# Patient Record
Sex: MALE | Race: WHITE | NOT HISPANIC OR LATINO | Employment: OTHER | URBAN - METROPOLITAN AREA
[De-identification: names, ages, dates, MRNs, and addresses within clinical notes are randomized per-mention and may not be internally consistent; named-entity substitution may affect disease eponyms.]

---

## 2023-08-22 ENCOUNTER — APPOINTMENT (OUTPATIENT)
Dept: CARDIOLOGY | Facility: HOSPITAL | Age: 53
DRG: 281 | End: 2023-08-22
Payer: COMMERCIAL

## 2023-08-22 ENCOUNTER — APPOINTMENT (OUTPATIENT)
Dept: GENERAL RADIOLOGY | Facility: HOSPITAL | Age: 53
DRG: 281 | End: 2023-08-22
Payer: COMMERCIAL

## 2023-08-22 ENCOUNTER — APPOINTMENT (OUTPATIENT)
Dept: ULTRASOUND IMAGING | Facility: HOSPITAL | Age: 53
DRG: 281 | End: 2023-08-22
Payer: COMMERCIAL

## 2023-08-22 ENCOUNTER — HOSPITAL ENCOUNTER (INPATIENT)
Facility: HOSPITAL | Age: 53
LOS: 9 days | Discharge: HOME | DRG: 281 | End: 2023-08-31
Attending: EMERGENCY MEDICINE | Admitting: FAMILY MEDICINE
Payer: COMMERCIAL

## 2023-08-22 DIAGNOSIS — R26.2 DIFFICULTY WALKING: ICD-10-CM

## 2023-08-22 DIAGNOSIS — Z74.09 IMPAIRED MOBILITY AND ADLS: ICD-10-CM

## 2023-08-22 DIAGNOSIS — I50.9 ACUTE ON CHRONIC CONGESTIVE HEART FAILURE, UNSPECIFIED HEART FAILURE TYPE: Primary | ICD-10-CM

## 2023-08-22 DIAGNOSIS — Z78.9 IMPAIRED MOBILITY AND ADLS: ICD-10-CM

## 2023-08-22 DIAGNOSIS — I50.21 ACUTE HFREF (HEART FAILURE WITH REDUCED EJECTION FRACTION): ICD-10-CM

## 2023-08-22 DIAGNOSIS — R07.89 CHEST TIGHTNESS: ICD-10-CM

## 2023-08-22 LAB
ALBUMIN SERPL-MCNC: 4.2 G/DL (ref 3.5–5.2)
ALBUMIN SERPL-MCNC: 4.4 G/DL (ref 3.5–5.2)
ALBUMIN/GLOB SERPL: 1.4 G/DL
ALBUMIN/GLOB SERPL: 1.5 G/DL
ALP SERPL-CCNC: 105 U/L (ref 39–117)
ALP SERPL-CCNC: 111 U/L (ref 39–117)
ALT SERPL W P-5'-P-CCNC: 17 U/L (ref 1–41)
ALT SERPL W P-5'-P-CCNC: 18 U/L (ref 1–41)
ANION GAP SERPL CALCULATED.3IONS-SCNC: 11.5 MMOL/L (ref 5–15)
ANION GAP SERPL CALCULATED.3IONS-SCNC: 11.9 MMOL/L (ref 5–15)
AST SERPL-CCNC: 73 U/L (ref 1–40)
AST SERPL-CCNC: 78 U/L (ref 1–40)
BASOPHILS # BLD AUTO: 0.03 10*3/MM3 (ref 0–0.2)
BASOPHILS # BLD AUTO: 0.06 10*3/MM3 (ref 0–0.2)
BASOPHILS NFR BLD AUTO: 0.7 % (ref 0–1.5)
BASOPHILS NFR BLD AUTO: 1 % (ref 0–1.5)
BH CV ECHO MEAS - AI P1/2T: 475.7 MSEC
BH CV ECHO MEAS - AO ROOT DIAM: 3.3 CM
BH CV ECHO MEAS - EDV(CUBED): 243 ML
BH CV ECHO MEAS - EDV(MOD-SP2): 175 ML
BH CV ECHO MEAS - EDV(MOD-SP4): 258 ML
BH CV ECHO MEAS - EF(MOD-BP): 10.3 %
BH CV ECHO MEAS - EF(MOD-SP2): 23.4 %
BH CV ECHO MEAS - EF(MOD-SP4): 4.3 %
BH CV ECHO MEAS - ESV(CUBED): 209.7 ML
BH CV ECHO MEAS - ESV(MOD-SP2): 134 ML
BH CV ECHO MEAS - ESV(MOD-SP4): 247 ML
BH CV ECHO MEAS - FS: 4.8 %
BH CV ECHO MEAS - IVS/LVPW: 1.02 CM
BH CV ECHO MEAS - IVSD: 0.95 CM
BH CV ECHO MEAS - LA DIMENSION: 4.3 CM
BH CV ECHO MEAS - LAT PEAK E' VEL: 4.7 CM/SEC
BH CV ECHO MEAS - LV MASS(C)D: 241.9 GRAMS
BH CV ECHO MEAS - LVIDD: 6.2 CM
BH CV ECHO MEAS - LVIDS: 5.9 CM
BH CV ECHO MEAS - LVPWD: 0.92 CM
BH CV ECHO MEAS - MED PEAK E' VEL: 3.5 CM/SEC
BH CV ECHO MEAS - MV A MAX VEL: 39 CM/SEC
BH CV ECHO MEAS - MV DEC SLOPE: 401.6 CM/SEC2
BH CV ECHO MEAS - MV DEC TIME: 0.11 MSEC
BH CV ECHO MEAS - MV E MAX VEL: 42.4 CM/SEC
BH CV ECHO MEAS - MV E/A: 1.09
BH CV ECHO MEAS - RAP SYSTOLE: 5 MMHG
BH CV ECHO MEAS - RVDD: 2.02 CM
BH CV ECHO MEAS - RVSP: 24.1 MMHG
BH CV ECHO MEAS - SV(MOD-SP2): 41 ML
BH CV ECHO MEAS - SV(MOD-SP4): 11 ML
BH CV ECHO MEAS - TR MAX PG: 19.1 MMHG
BH CV ECHO MEAS - TR MAX VEL: 218.5 CM/SEC
BH CV ECHO MEASUREMENTS AVERAGE E/E' RATIO: 10.34
BILIRUB SERPL-MCNC: 0.6 MG/DL (ref 0–1.2)
BILIRUB SERPL-MCNC: 0.6 MG/DL (ref 0–1.2)
BUN SERPL-MCNC: 11 MG/DL (ref 6–20)
BUN SERPL-MCNC: 9 MG/DL (ref 6–20)
BUN/CREAT SERPL: 8.4 (ref 7–25)
BUN/CREAT SERPL: 9.6 (ref 7–25)
CALCIUM SPEC-SCNC: 8.3 MG/DL (ref 8.6–10.5)
CALCIUM SPEC-SCNC: 8.9 MG/DL (ref 8.6–10.5)
CHLORIDE SERPL-SCNC: 94 MMOL/L (ref 98–107)
CHLORIDE SERPL-SCNC: 96 MMOL/L (ref 98–107)
CHOLEST SERPL-MCNC: 128 MG/DL (ref 0–200)
CO2 SERPL-SCNC: 24.5 MMOL/L (ref 22–29)
CO2 SERPL-SCNC: 25.1 MMOL/L (ref 22–29)
CREAT SERPL-MCNC: 1.07 MG/DL (ref 0.76–1.27)
CREAT SERPL-MCNC: 1.15 MG/DL (ref 0.76–1.27)
D-LACTATE SERPL-SCNC: 1.9 MMOL/L (ref 0.5–2)
DEPRECATED RDW RBC AUTO: 55.6 FL (ref 37–54)
DEPRECATED RDW RBC AUTO: 55.7 FL (ref 37–54)
EGFRCR SERPLBLD CKD-EPI 2021: 77.1 ML/MIN/1.73
EGFRCR SERPLBLD CKD-EPI 2021: 84 ML/MIN/1.73
EOSINOPHIL # BLD AUTO: 0.08 10*3/MM3 (ref 0–0.4)
EOSINOPHIL # BLD AUTO: 0.16 10*3/MM3 (ref 0–0.4)
EOSINOPHIL NFR BLD AUTO: 1.8 % (ref 0.3–6.2)
EOSINOPHIL NFR BLD AUTO: 2.6 % (ref 0.3–6.2)
ERYTHROCYTE [DISTWIDTH] IN BLOOD BY AUTOMATED COUNT: 15 % (ref 12.3–15.4)
ERYTHROCYTE [DISTWIDTH] IN BLOOD BY AUTOMATED COUNT: 15 % (ref 12.3–15.4)
FOLATE SERPL-MCNC: 10.1 NG/ML (ref 4.78–24.2)
GEN 5 2HR TROPONIN T REFLEX: 125 NG/L
GLOBULIN UR ELPH-MCNC: 3 GM/DL
GLOBULIN UR ELPH-MCNC: 3 GM/DL
GLUCOSE SERPL-MCNC: 68 MG/DL (ref 65–99)
GLUCOSE SERPL-MCNC: 78 MG/DL (ref 65–99)
HBA1C MFR BLD: 5.4 % (ref 4.8–5.6)
HCT VFR BLD AUTO: 30 % (ref 37.5–51)
HCT VFR BLD AUTO: 31.2 % (ref 37.5–51)
HDLC SERPL-MCNC: 57 MG/DL (ref 40–60)
HGB BLD-MCNC: 10.8 G/DL (ref 13–17.7)
HGB BLD-MCNC: 10.8 G/DL (ref 13–17.7)
HOLD SPECIMEN: NORMAL
HOLD SPECIMEN: NORMAL
IMM GRANULOCYTES # BLD AUTO: 0.01 10*3/MM3 (ref 0–0.05)
IMM GRANULOCYTES # BLD AUTO: 0.02 10*3/MM3 (ref 0–0.05)
IMM GRANULOCYTES NFR BLD AUTO: 0.2 % (ref 0–0.5)
IMM GRANULOCYTES NFR BLD AUTO: 0.3 % (ref 0–0.5)
LDLC SERPL CALC-MCNC: 58 MG/DL (ref 0–100)
LDLC/HDLC SERPL: 1.04 {RATIO}
LEFT ATRIUM VOLUME INDEX: 46.2 ML/M2
LYMPHOCYTES # BLD AUTO: 1.54 10*3/MM3 (ref 0.7–3.1)
LYMPHOCYTES # BLD AUTO: 1.78 10*3/MM3 (ref 0.7–3.1)
LYMPHOCYTES NFR BLD AUTO: 28.4 % (ref 19.6–45.3)
LYMPHOCYTES NFR BLD AUTO: 34.8 % (ref 19.6–45.3)
MCH RBC QN AUTO: 35.3 PG (ref 26.6–33)
MCH RBC QN AUTO: 36.1 PG (ref 26.6–33)
MCHC RBC AUTO-ENTMCNC: 34.6 G/DL (ref 31.5–35.7)
MCHC RBC AUTO-ENTMCNC: 36 G/DL (ref 31.5–35.7)
MCV RBC AUTO: 100.3 FL (ref 79–97)
MCV RBC AUTO: 102 FL (ref 79–97)
MONOCYTES # BLD AUTO: 0.4 10*3/MM3 (ref 0.1–0.9)
MONOCYTES # BLD AUTO: 0.57 10*3/MM3 (ref 0.1–0.9)
MONOCYTES NFR BLD AUTO: 9 % (ref 5–12)
MONOCYTES NFR BLD AUTO: 9.1 % (ref 5–12)
NEUTROPHILS NFR BLD AUTO: 2.37 10*3/MM3 (ref 1.7–7)
NEUTROPHILS NFR BLD AUTO: 3.67 10*3/MM3 (ref 1.7–7)
NEUTROPHILS NFR BLD AUTO: 53.5 % (ref 42.7–76)
NEUTROPHILS NFR BLD AUTO: 58.6 % (ref 42.7–76)
NRBC BLD AUTO-RTO: 0 /100 WBC (ref 0–0.2)
NRBC BLD AUTO-RTO: 0 /100 WBC (ref 0–0.2)
NT-PROBNP SERPL-MCNC: 2246 PG/ML (ref 0–900)
PLATELET # BLD AUTO: 290 10*3/MM3 (ref 140–450)
PLATELET # BLD AUTO: 318 10*3/MM3 (ref 140–450)
PMV BLD AUTO: 8.6 FL (ref 6–12)
PMV BLD AUTO: 8.8 FL (ref 6–12)
POTASSIUM SERPL-SCNC: 3.3 MMOL/L (ref 3.5–5.2)
POTASSIUM SERPL-SCNC: 4 MMOL/L (ref 3.5–5.2)
PROT SERPL-MCNC: 7.2 G/DL (ref 6–8.5)
PROT SERPL-MCNC: 7.4 G/DL (ref 6–8.5)
QT INTERVAL: 411 MS
RBC # BLD AUTO: 2.99 10*6/MM3 (ref 4.14–5.8)
RBC # BLD AUTO: 3.06 10*6/MM3 (ref 4.14–5.8)
SODIUM SERPL-SCNC: 131 MMOL/L (ref 136–145)
SODIUM SERPL-SCNC: 132 MMOL/L (ref 136–145)
TRIGL SERPL-MCNC: 60 MG/DL (ref 0–150)
TROPONIN T DELTA: -26 NG/L
TROPONIN T SERPL HS-MCNC: 151 NG/L
VIT B12 BLD-MCNC: 340 PG/ML (ref 211–946)
VLDLC SERPL-MCNC: 13 MG/DL (ref 5–40)
WBC NRBC COR # BLD: 4.43 10*3/MM3 (ref 3.4–10.8)
WBC NRBC COR # BLD: 6.26 10*3/MM3 (ref 3.4–10.8)
WHOLE BLOOD HOLD COAG: NORMAL
WHOLE BLOOD HOLD SPECIMEN: NORMAL

## 2023-08-22 PROCEDURE — 87040 BLOOD CULTURE FOR BACTERIA: CPT

## 2023-08-22 PROCEDURE — 94799 UNLISTED PULMONARY SVC/PX: CPT

## 2023-08-22 PROCEDURE — 87040 BLOOD CULTURE FOR BACTERIA: CPT | Performed by: EMERGENCY MEDICINE

## 2023-08-22 PROCEDURE — 93005 ELECTROCARDIOGRAM TRACING: CPT

## 2023-08-22 PROCEDURE — 94761 N-INVAS EAR/PLS OXIMETRY MLT: CPT

## 2023-08-22 PROCEDURE — 80053 COMPREHEN METABOLIC PANEL: CPT | Performed by: STUDENT IN AN ORGANIZED HEALTH CARE EDUCATION/TRAINING PROGRAM

## 2023-08-22 PROCEDURE — 83036 HEMOGLOBIN GLYCOSYLATED A1C: CPT | Performed by: STUDENT IN AN ORGANIZED HEALTH CARE EDUCATION/TRAINING PROGRAM

## 2023-08-22 PROCEDURE — 71045 X-RAY EXAM CHEST 1 VIEW: CPT

## 2023-08-22 PROCEDURE — 80053 COMPREHEN METABOLIC PANEL: CPT

## 2023-08-22 PROCEDURE — 25010000002 MORPHINE PER 10 MG: Performed by: FAMILY MEDICINE

## 2023-08-22 PROCEDURE — 82746 ASSAY OF FOLIC ACID SERUM: CPT | Performed by: STUDENT IN AN ORGANIZED HEALTH CARE EDUCATION/TRAINING PROGRAM

## 2023-08-22 PROCEDURE — 84484 ASSAY OF TROPONIN QUANT: CPT

## 2023-08-22 PROCEDURE — 25010000002 FUROSEMIDE PER 20 MG: Performed by: PHYSICIAN ASSISTANT

## 2023-08-22 PROCEDURE — 99223 1ST HOSP IP/OBS HIGH 75: CPT | Performed by: STUDENT IN AN ORGANIZED HEALTH CARE EDUCATION/TRAINING PROGRAM

## 2023-08-22 PROCEDURE — 85025 COMPLETE CBC W/AUTO DIFF WBC: CPT

## 2023-08-22 PROCEDURE — 80061 LIPID PANEL: CPT | Performed by: STUDENT IN AN ORGANIZED HEALTH CARE EDUCATION/TRAINING PROGRAM

## 2023-08-22 PROCEDURE — 25010000002 FUROSEMIDE PER 20 MG: Performed by: EMERGENCY MEDICINE

## 2023-08-22 PROCEDURE — 25010000002 MORPHINE PER 10 MG: Performed by: EMERGENCY MEDICINE

## 2023-08-22 PROCEDURE — 93306 TTE W/DOPPLER COMPLETE: CPT | Performed by: INTERNAL MEDICINE

## 2023-08-22 PROCEDURE — 36415 COLL VENOUS BLD VENIPUNCTURE: CPT | Performed by: EMERGENCY MEDICINE

## 2023-08-22 PROCEDURE — 93971 EXTREMITY STUDY: CPT

## 2023-08-22 PROCEDURE — 93306 TTE W/DOPPLER COMPLETE: CPT

## 2023-08-22 PROCEDURE — 84484 ASSAY OF TROPONIN QUANT: CPT | Performed by: EMERGENCY MEDICINE

## 2023-08-22 PROCEDURE — 82607 VITAMIN B-12: CPT | Performed by: STUDENT IN AN ORGANIZED HEALTH CARE EDUCATION/TRAINING PROGRAM

## 2023-08-22 PROCEDURE — 25010000002 HEPARIN (PORCINE) PER 1000 UNITS: Performed by: STUDENT IN AN ORGANIZED HEALTH CARE EDUCATION/TRAINING PROGRAM

## 2023-08-22 PROCEDURE — 76700 US EXAM ABDOM COMPLETE: CPT

## 2023-08-22 PROCEDURE — 93005 ELECTROCARDIOGRAM TRACING: CPT | Performed by: EMERGENCY MEDICINE

## 2023-08-22 PROCEDURE — 83880 ASSAY OF NATRIURETIC PEPTIDE: CPT

## 2023-08-22 PROCEDURE — 93971 EXTREMITY STUDY: CPT | Performed by: SURGERY

## 2023-08-22 PROCEDURE — 99285 EMERGENCY DEPT VISIT HI MDM: CPT

## 2023-08-22 PROCEDURE — 99222 1ST HOSP IP/OBS MODERATE 55: CPT | Performed by: INTERNAL MEDICINE

## 2023-08-22 PROCEDURE — 85025 COMPLETE CBC W/AUTO DIFF WBC: CPT | Performed by: STUDENT IN AN ORGANIZED HEALTH CARE EDUCATION/TRAINING PROGRAM

## 2023-08-22 PROCEDURE — 83605 ASSAY OF LACTIC ACID: CPT

## 2023-08-22 RX ORDER — MORPHINE SULFATE 2 MG/ML
2 INJECTION, SOLUTION INTRAMUSCULAR; INTRAVENOUS EVERY 6 HOURS PRN
Status: DISCONTINUED | OUTPATIENT
Start: 2023-08-22 | End: 2023-08-27

## 2023-08-22 RX ORDER — SODIUM CHLORIDE 0.9 % (FLUSH) 0.9 %
10 SYRINGE (ML) INJECTION AS NEEDED
Status: DISCONTINUED | OUTPATIENT
Start: 2023-08-22 | End: 2023-08-31 | Stop reason: HOSPADM

## 2023-08-22 RX ORDER — LORAZEPAM 2 MG/ML
2 INJECTION INTRAMUSCULAR
Status: DISCONTINUED | OUTPATIENT
Start: 2023-08-22 | End: 2023-08-28

## 2023-08-22 RX ORDER — LORAZEPAM 2 MG/1
2 TABLET ORAL
Status: DISCONTINUED | OUTPATIENT
Start: 2023-08-22 | End: 2023-08-28

## 2023-08-22 RX ORDER — HYDROCODONE BITARTRATE AND ACETAMINOPHEN 10; 325 MG/1; MG/1
1 TABLET ORAL EVERY 6 HOURS PRN
Status: DISCONTINUED | OUTPATIENT
Start: 2023-08-22 | End: 2023-08-31 | Stop reason: HOSPADM

## 2023-08-22 RX ORDER — FUROSEMIDE 10 MG/ML
40 INJECTION INTRAMUSCULAR; INTRAVENOUS
Status: DISCONTINUED | OUTPATIENT
Start: 2023-08-22 | End: 2023-08-25

## 2023-08-22 RX ORDER — SPIRONOLACTONE 25 MG/1
25 TABLET ORAL DAILY
Status: DISCONTINUED | OUTPATIENT
Start: 2023-08-22 | End: 2023-08-29

## 2023-08-22 RX ORDER — MULTIPLE VITAMINS W/ MINERALS TAB 9MG-400MCG
1 TAB ORAL DAILY
Status: DISCONTINUED | OUTPATIENT
Start: 2023-08-22 | End: 2023-08-28

## 2023-08-22 RX ORDER — SODIUM CHLORIDE 0.9 % (FLUSH) 0.9 %
10 SYRINGE (ML) INJECTION EVERY 12 HOURS SCHEDULED
Status: DISCONTINUED | OUTPATIENT
Start: 2023-08-22 | End: 2023-08-31 | Stop reason: HOSPADM

## 2023-08-22 RX ORDER — HYDROCODONE BITARTRATE AND ACETAMINOPHEN 5; 325 MG/1; MG/1
1 TABLET ORAL EVERY 6 HOURS PRN
Status: DISCONTINUED | OUTPATIENT
Start: 2023-08-22 | End: 2023-08-31 | Stop reason: HOSPADM

## 2023-08-22 RX ORDER — FUROSEMIDE 10 MG/ML
40 INJECTION INTRAMUSCULAR; INTRAVENOUS ONCE
Status: COMPLETED | OUTPATIENT
Start: 2023-08-22 | End: 2023-08-22

## 2023-08-22 RX ORDER — SODIUM CHLORIDE 9 MG/ML
40 INJECTION, SOLUTION INTRAVENOUS AS NEEDED
Status: DISCONTINUED | OUTPATIENT
Start: 2023-08-22 | End: 2023-08-31 | Stop reason: HOSPADM

## 2023-08-22 RX ORDER — LORAZEPAM 2 MG/ML
1 INJECTION INTRAMUSCULAR
Status: DISCONTINUED | OUTPATIENT
Start: 2023-08-22 | End: 2023-08-28

## 2023-08-22 RX ORDER — METOPROLOL SUCCINATE 25 MG/1
25 TABLET, EXTENDED RELEASE ORAL
Status: DISCONTINUED | OUTPATIENT
Start: 2023-08-22 | End: 2023-08-29

## 2023-08-22 RX ORDER — FOLIC ACID 1 MG/1
1 TABLET ORAL DAILY
Status: DISCONTINUED | OUTPATIENT
Start: 2023-08-22 | End: 2023-08-28

## 2023-08-22 RX ORDER — LORAZEPAM 0.5 MG/1
1 TABLET ORAL
Status: DISCONTINUED | OUTPATIENT
Start: 2023-08-22 | End: 2023-08-28

## 2023-08-22 RX ORDER — HEPARIN SODIUM 5000 [USP'U]/ML
5000 INJECTION, SOLUTION INTRAVENOUS; SUBCUTANEOUS EVERY 8 HOURS SCHEDULED
Status: DISCONTINUED | OUTPATIENT
Start: 2023-08-22 | End: 2023-08-31 | Stop reason: HOSPADM

## 2023-08-22 RX ORDER — FUROSEMIDE 10 MG/ML
40 INJECTION INTRAMUSCULAR; INTRAVENOUS ONCE
Status: DISCONTINUED | OUTPATIENT
Start: 2023-08-22 | End: 2023-08-22

## 2023-08-22 RX ADMIN — SPIRONOLACTONE 25 MG: 25 TABLET ORAL at 18:25

## 2023-08-22 RX ADMIN — Medication 10 ML: at 15:12

## 2023-08-22 RX ADMIN — MORPHINE SULFATE 2 MG: 2 INJECTION, SOLUTION INTRAMUSCULAR; INTRAVENOUS at 15:11

## 2023-08-22 RX ADMIN — EMPAGLIFLOZIN 10 MG: 10 TABLET, FILM COATED ORAL at 15:11

## 2023-08-22 RX ADMIN — Medication 100 MG: at 10:05

## 2023-08-22 RX ADMIN — MORPHINE SULFATE 2 MG: 2 INJECTION, SOLUTION INTRAMUSCULAR; INTRAVENOUS at 21:42

## 2023-08-22 RX ADMIN — Medication 10 ML: at 21:43

## 2023-08-22 RX ADMIN — HYDROCODONE BITARTRATE AND ACETAMINOPHEN 1 TABLET: 10; 325 TABLET ORAL at 10:24

## 2023-08-22 RX ADMIN — HYDROCODONE BITARTRATE AND ACETAMINOPHEN 1 TABLET: 10; 325 TABLET ORAL at 18:24

## 2023-08-22 RX ADMIN — HEPARIN SODIUM 5000 UNITS: 5000 INJECTION INTRAVENOUS; SUBCUTANEOUS at 21:42

## 2023-08-22 RX ADMIN — FUROSEMIDE 40 MG: 10 INJECTION, SOLUTION INTRAMUSCULAR; INTRAVENOUS at 18:25

## 2023-08-22 RX ADMIN — METOPROLOL SUCCINATE 25 MG: 25 TABLET, EXTENDED RELEASE ORAL at 15:08

## 2023-08-22 RX ADMIN — Medication 1 TABLET: at 10:04

## 2023-08-22 RX ADMIN — MORPHINE SULFATE 4 MG: 4 INJECTION, SOLUTION INTRAMUSCULAR; INTRAVENOUS at 06:34

## 2023-08-22 RX ADMIN — WHITE PETROLATUM 1 APPLICATION: 1.75 OINTMENT TOPICAL at 18:24

## 2023-08-22 RX ADMIN — MORPHINE SULFATE 2 MG: 2 INJECTION, SOLUTION INTRAMUSCULAR; INTRAVENOUS at 09:22

## 2023-08-22 RX ADMIN — FOLIC ACID 1 MG: 1 TABLET ORAL at 10:03

## 2023-08-22 RX ADMIN — HEPARIN SODIUM 5000 UNITS: 5000 INJECTION INTRAVENOUS; SUBCUTANEOUS at 15:10

## 2023-08-22 RX ADMIN — FUROSEMIDE 40 MG: 10 INJECTION, SOLUTION INTRAMUSCULAR; INTRAVENOUS at 06:34

## 2023-08-22 NOTE — H&P
[facility]  Date: 2023   Patient Name: Greyson Schofield  : 1970  MRN: [id number]  Primary Care Physician:  Bertha Doe DO  Date of admission: 2023    Subjective   Subjective     Chief Complaint: Bilateral lower extremity swelling    HPI:    Greyson Schofield is a 53 y.o. male with a past medical history of traumatic brain injury, no other medical conditions presented to the ED for evaluation of bilateral lower extremity swelling that started approximately 4 days ago and has been progressively worsening.  Patient has come from [place] by road in early July and since then he had mild bilateral lower extremity edema but for the last 4 days lower extremity edema has been increasing and it has extended up to the sacral region.  Associated with shortness of breath, orthopnea.  Drinks 4 shots of alcohol every day since many years, last drink yesterday night.  Denies any known liver disease.  Denies any chest pain, nausea, vomiting, diaphoresis, focal weakness, numbness, tingling.  Patient states that he has a strong family history of heart failure with multiple family members having the heart failure.    In the ED, vital signs 97.5, pulse 88, respirate rate 18, blood pressure 108/76, on room air saturating around 98%.  Initial troponin 151, repeat troponin 125, proBNP 2246, sodium 132, AST/ALT 78/18, chloride 96, rest of the CMP within normal limits, normal lactic acid, WBC 6.26 hemoglobin 10.8, platelets 318.  Blood cultures pending.  Chest x-ray showed mild to moderate cardiomegaly.  Mild pulmonary edema with vascular congestion and suggested.  Mild subsegmental atelectasis may involve the lung bases.  Pneumonia cannot be excluded entirely but is thought to be less likely.  Patient has been admitted for further evaluation and management of anasarca, possible new onset CHF exacerbation      Personal History     Past medical history  Traumatic brain injury 7  years ago      Past Surgical History:   Procedure Laterality Date    APPENDECTOMY           History reviewed. No pertinent family history.      Social History     Socioeconomic History    Marital status:    Tobacco Use    Smoking status: Every Day     Packs/day: 0.50     Types: Cigarettes   Substance and Sexual Activity    Alcohol use: Yes     Alcohol/week: 4.0 standard drinks     Types: 4 Shots of liquor per week     Comment: bourbon 2-4 shots per day    Drug use: Yes     Types: Marijuana         Home Medications:       Allergies:  No Known Allergies    Review of Systems   All other systems reviewed and negative except as mentioned above in the HPI    Objective   Objective     Vitals:   Temp:  [97.5 °F (36.4 °C)] 97.5 °F (36.4 °C)  Heart Rate:  [82-88] 82  Resp:  [18] 18  BP: (108-116)/(76-95) 116/95    Physical Exam    Constitutional: Awake, alert, mild acute distress, oriented x3   Eyes: Pupils equal, sclerae anicteric, no conjunctival injection   HENT: NCAT, mucous membranes moist   Respiratory: Bilateral air entry present, bibasilar crackles noted, nonlabored respirations    Cardiovascular: RRR, no murmurs, rubs, or gallops   Gastrointestinal: Positive bowel sounds, soft, nontender, nondistended   Musculoskeletal: 3+ bilateral lower extremity edema extending up to the sacral region, no clubbing or cyanosis to extremities   Neurologic: Oriented x 3, speech clear      Result Review    Result Review:  I have personally reviewed the results from the time of this admission to 8/22/2023 06:54 EDT and agree with these findings:  [x]  Laboratory  []  Microbiology  []  Radiology  [x]  EKG/Telemetry   [x]  Cardiology/Vascular   []  Pathology  []  Old records  []  Other:        Imaging Results (Last 24 Hours)       Procedure Component Value Units Date/Time    XR Chest 1 View [[id number]] Collected: 08/22/23 0305     Updated: 08/22/23 0308    Narrative:      PROCEDURE: XR CHEST 1 VW     COMPARISON: None.      INDICATIONS: shortness of breath     FINDINGS:   A single AP upright portable chest radiograph reveals mild-to-moderate cardiomegaly, mild pulmonary   edema with vascular congestion, minimal, if any, pleural effusion, no pneumothorax, and an old   healed distal right clavicle fracture.  Mild thoracic aortic atherosclerotic change is possible.    There may be chronic calcified granulomatous disease of the chest.       Impression:            1. There is mild-to-moderate cardiomegaly.       2. Mild pulmonary edema with vascular congestion is suggested.  Mild subsegmental atelectasis may   involve the lung bases.  Pneumonia cannot be excluded entirely but is thought to be less likely.     3. Please see above comments for further detail.                 Please note that portions of this note were completed with a voice recognition program.     CORINA OLVERA JR, MD         Electronically Signed and Approved By: CORINA OLVERA JR, MD on 8/22/2023 at 3:05                                         Assessment & Plan   Assessment / Plan     Assessment/Plan:   Anasarca  Possible New onset CHF  Pulmonary edema  Elevated troponin likely NSTEMI type II from cardiac strain from CHF exacerbation  Mild hyponatremia, likely due to volume overload  Macrocytic anemia  History of chronic alcohol use    Plan  Admit to inpatient, telemetry  Received IV Lasix 40 mg  Redose another IV Lasix 40 mg in 6 hours, further diuresis per cardiology   Strict I&O's  Daily weights  Cardiac echo  Will consult cardiology in the a.m. Dr. Lynch  Heart healthy, consistent carb diet  A1c, lipid panel, B12, folic acid levels  Monitor electrolytes, kidney function with labs around noon  Keep K greater than 4, mag greater than 2  Check urinalysis for any underlying proteinuria  Monitor on CIWA protocol    DVT prophylaxis:  No DVT prophylaxis order currently exists.    CODE STATUS:    Level Of Support Discussed With: Patient  Code Status (Patient has no  pulse and is not breathing): CPR (Attempt to Resuscitate)  Medical Interventions (Patient has pulse or is breathing): Full Support      Admission Status:  I believe this patient meets inpatient status.    Part of this note may be an electronic transcription/translation of spoken language to printed text using the Dragon Dictation System    Keyonna Gibson MD

## 2023-08-22 NOTE — Clinical Note
The DP pulses are +1 bilaterally. The PT pulses are +1 bilaterally. The left radial pulse is +2. The left femoral pulse is +2.

## 2023-08-22 NOTE — ED PROVIDER NOTES
Time: 6:35 AM EDT  Date of encounter:  8/22/2023  Independent Historian/Clinical History and Information was obtained by:   Patient    History is limited by: N/A    Chief Complaint: lower extremity edema      History of Present Illness:  Patient is a 53 y.o. year old male who presents to the emergency department for evaluation of Bilateral lower extremity edema.  Patient states this started approximate 4 days ago and has been getting progressively worse.  He also reports shortness of breath.  He denies any chest pain.  He states this also feels as if his abdomen is distended.  He does have a history of daily alcohol use.  He states he drinks 4 shots daily.  Denies any known history of heart or liver disease.    HPI    Patient Care Team  Primary Care Provider: Bertha Doe DO    Past Medical History:     No Known Allergies  Past Medical History:   Diagnosis Date    Arthritis      Past Surgical History:   Procedure Laterality Date    APPENDECTOMY       History reviewed. No pertinent family history.    Home Medications:  Prior to Admission medications    Not on File        Social History:   Social History     Tobacco Use    Smoking status: Every Day     Packs/day: 0.50     Years: 33.00     Pack years: 16.50     Types: Cigarettes     Start date: 10/1993    Smokeless tobacco: Former     Types: Snuff   Substance Use Topics    Drug use: Yes     Types: Marijuana, Cocaine(coke)         Review of Systems:  Review of Systems   Constitutional:  Negative for chills and fever.   HENT:  Negative for congestion, ear pain and sore throat.    Eyes:  Negative for pain.   Respiratory:  Negative for cough, chest tightness and shortness of breath.    Cardiovascular:  Positive for leg swelling. Negative for chest pain.   Gastrointestinal:  Negative for abdominal pain, diarrhea, nausea and vomiting.   Genitourinary:  Negative for flank pain and hematuria.   Musculoskeletal:  Negative for joint swelling.   Skin:  Negative for pallor.  "  Neurological:  Negative for seizures and headaches.   All other systems reviewed and are negative.     Physical Exam:  /85 (BP Location: Right arm, Patient Position: Lying)   Pulse 82   Temp 98.3 °F (36.8 °C) (Oral)   Resp 18   Ht 165.1 cm (65\")   Wt 82.3 kg (181 lb 7 oz)   SpO2 92%   BMI 30.19 kg/m²     Physical Exam  Constitutional:       Appearance: Normal appearance.   HENT:      Head: Normocephalic and atraumatic.      Nose: Nose normal.      Mouth/Throat:      Mouth: Mucous membranes are moist.   Eyes:      Extraocular Movements: Extraocular movements intact.      Conjunctiva/sclera: Conjunctivae normal.      Pupils: Pupils are equal, round, and reactive to light.   Cardiovascular:      Rate and Rhythm: Normal rate and regular rhythm.      Pulses: Normal pulses.      Heart sounds: Normal heart sounds.   Pulmonary:      Effort: Pulmonary effort is normal.      Breath sounds: Normal breath sounds.   Abdominal:      General: There is distension.      Palpations: Abdomen is soft.      Tenderness: There is no abdominal tenderness.   Musculoskeletal:         General: Normal range of motion.      Cervical back: Normal range of motion.      Right lower leg: Edema present.      Left lower leg: Edema present.      Comments: anasarca   Skin:     General: Skin is warm and dry.      Capillary Refill: Capillary refill takes less than 2 seconds.   Neurological:      General: No focal deficit present.      Mental Status: He is alert and oriented to person, place, and time. Mental status is at baseline.   Psychiatric:         Mood and Affect: Mood normal.         Behavior: Behavior normal.                Procedures:  Procedures      Medical Decision Making:      Comorbidities that affect care:    Daily alcohol use    External Notes reviewed:    None      The following orders were placed and all results were independently analyzed by me:  Orders Placed This Encounter   Procedures    Blood Culture - Blood,    " Blood Culture - Blood,    XR Chest 1 View    US Abdomen Complete    [place] Draw    Comprehensive Metabolic Panel    BNP    Single High Sensitivity Troponin T    Lactic Acid, Plasma    CBC Auto Differential    High Sensitivity Troponin T 2Hr    CBC Auto Differential    Comprehensive Metabolic Panel    Lipid Panel    Hemoglobin A1c    Urinalysis With Microscopic If Indicated (No Culture) - Urine, Clean Catch    Vitamin B12    Folate    CBC (No Diff)    Renal Function Panel    Hepatic Function Panel    Magnesium    Vitamin B12    Folate    Diet: Cardiac Diets; Healthy Heart (2-3 Na+); Texture: Regular Texture (IDDSI 7); Fluid Consistency: Thin (IDDSI 0)    Undress & Gown    Vital Signs    Vital Signs    Intake & Output    Weigh Patient    Oral Care    Saline Lock & Maintain IV Access    Activity - Strict Bed Rest    Strict Intake & Output    Daily Weights    Strict Intake & Output    Daily Weights    Vital Signs    Continuous Pulse Oximetry    Obtain Baseline Clinical [place] Withdrawal Assessment - Ar (CIWA-Ar), Sedation Scale & Vital Signs    Clinical [place] Withdrawal Assessment (CIWA-Ar)    If CIWA-Ar Score Less Than 8 For 3 Consecutive Assessments, Monitor Every 4 Hours & Discontinue Assessment When CIWA-Ar Less Than 8 for 24 Hours    Obtain Pre & Post Sedation Scores With Every Lorazepam Dose - Hold For POSS Greater Than 2 or RASS of -3 or Less    Notify Provider - Withdrawal    Notify Provider of Abnormal Lab Results    Notify Provider - Vitals    Elevate Heels Off of Bed    Skin Care    Code Status and Medical Interventions:    Inpatient Hospitalist Consult    Inpatient Cardiology Consult    Inpatient Case Management  Consult    OT Consult: Eval & Treat    PT Consult: Eval & Treat Functional Mobility Below Baseline    Oxygen Therapy- Nasal Cannula; Titrate 1-6 LPM Per SpO2; 90 - 95%    ECG 12 Lead ED Triage Standing Order; SOA    Adult Transthoracic Echo Complete W/ Cont if Necessary Per  Protocol    Wound Ostomy Eval & Treat    Insert Peripheral IV    Insert Peripheral IV    Inpatient Admission    Seizure Precautions    Safety Precautions    CBC & Differential    Green Top (Gel)    Lavender Top    Gold Top - SST    Light Blue Top       Medications Given in the Emergency Department:  Medications   sodium chloride 0.9 % flush 10 mL (has no administration in time range)   sodium chloride 0.9 % flush 10 mL (10 mL Intravenous Given 8/22/23 1512)   sodium chloride 0.9 % flush 10 mL (has no administration in time range)   sodium chloride 0.9 % infusion 40 mL (has no administration in time range)   heparin (porcine) 5000 UNIT/ML injection 5,000 Units (5,000 Units Subcutaneous Given 8/22/23 1510)   folic acid (FOLVITE) tablet 1 mg (1 mg Oral Given 8/22/23 1003)   LORazepam (ATIVAN) tablet 1 mg (has no administration in time range)     Or   LORazepam (ATIVAN) injection 1 mg (has no administration in time range)     Or   LORazepam (ATIVAN) tablet 2 mg (has no administration in time range)     Or   LORazepam (ATIVAN) injection 2 mg (has no administration in time range)     Or   LORazepam (ATIVAN) injection 2 mg (has no administration in time range)     Or   LORazepam (ATIVAN) injection 2 mg (has no administration in time range)   multivitamin with minerals 1 tablet (1 tablet Oral Given 8/22/23 1004)   thiamine (VITAMIN B-1) tablet 100 mg (100 mg Oral Given 8/22/23 1005)   HYDROcodone-acetaminophen (NORCO) 5-325 MG per tablet 1 tablet (has no administration in time range)   HYDROcodone-acetaminophen (NORCO)  MG per tablet 1 tablet (1 tablet Oral Given 8/22/23 1024)   morphine injection 2 mg (2 mg Intravenous Given 8/22/23 1511)   furosemide (LASIX) injection 40 mg (has no administration in time range)   metoprolol succinate XL (TOPROL-XL) 24 hr tablet 25 mg (25 mg Oral Given 8/22/23 1508)   empagliflozin (JARDIANCE) tablet 10 mg (10 mg Oral Given 8/22/23 1511)   spironolactone (ALDACTONE) tablet 25 mg (has  no administration in time range)   mineral oil-hydrophilic petrolatum (AQUAPHOR) ointment 1 application  (has no administration in time range)   furosemide (LASIX) injection 40 mg (40 mg Intravenous Given 8/22/23 0634)   morphine injection 4 mg (4 mg Intravenous Given 8/22/23 0634)        ED Course:    ED Course as of 08/22/23 1807   Tue Aug 22, 2023   1804 ECG 12 Lead ED Triage Standing Order; SOA  Sinus rhythm rate of 84.  Prolonged IL interval.  No acute ST elevation.  Normal QTc.  EKG interpreted by me.  No previous available for comparison. [LD]      ED Course User Index  [LD] Mike Zelaya MD       Labs:    Lab Results (last 24 hours)       Procedure Component Value Units Date/Time    CBC & Differential [[id number]]  (Abnormal) Collected: 08/22/23 0151    Specimen: Blood Updated: 08/22/23 0225    Narrative:      The following orders were created for panel order CBC & Differential.  Procedure                               Abnormality         Status                     ---------                               -----------         ------                     CBC Auto Differential[[id number]]        Abnormal            Final result                 Please view results for these tests on the individual orders.    Comprehensive Metabolic Panel [[id number]]  (Abnormal) Collected: 08/22/23 0151    Specimen: Blood Updated: 08/22/23 0248     Glucose 78 mg/dL      BUN 11 mg/dL      Creatinine 1.15 mg/dL      Sodium 132 mmol/L      Potassium 4.0 mmol/L      Chloride 96 mmol/L      CO2 24.5 mmol/L      Calcium 8.9 mg/dL      Total Protein 7.4 g/dL      Albumin 4.4 g/dL      ALT (SGPT) 18 U/L      AST (SGOT) 78 U/L      Alkaline Phosphatase 111 U/L      Total Bilirubin 0.6 mg/dL      Globulin 3.0 gm/dL      A/G Ratio 1.5 g/dL      BUN/Creatinine Ratio 9.6     Anion Gap 11.5 mmol/L      eGFR 77.1 mL/min/1.73     Narrative:      GFR Normal >60  Chronic Kidney Disease <60  Kidney Failure <15      BNP [[id number]]  (Abnormal)  Collected: 08/22/23 0151    Specimen: Blood Updated: 08/22/23 0243     proBNP 2,246.0 pg/mL     Narrative:      Among patients with dyspnea, NT-proBNP is highly sensitive for the detection of acute congestive heart failure. In addition NT-proBNP of <300 pg/ml effectively rules out acute congestive heart failure with 99% negative predictive value.      Single High Sensitivity Troponin T [[id number]]  (Abnormal) Collected: 08/22/23 0151    Specimen: Blood Updated: 08/22/23 0300     HS Troponin T 151 ng/L     Narrative:      High Sensitive Troponin T Reference Range:  <10.0 ng/L- Negative Female for AMI  <15.0 ng/L- Negative Male for AMI  >=10 - Abnormal Female indicating possible myocardial injury.  >=15 - Abnormal Male indicating possible myocardial injury.   Clinicians would have to utilize clinical acumen, EKG, Troponin, and serial changes to determine if it is an Acute Myocardial Infarction or myocardial injury due to an underlying chronic condition.         Lactic Acid, Plasma [[id number]]  (Normal) Collected: 08/22/23 0151    Specimen: Blood Updated: 08/22/23 0241     Lactate 1.9 mmol/L     Blood Culture - Blood, Arm, Right [[id number]] Collected: 08/22/23 0151    Specimen: Blood from Arm, Right Updated: 08/22/23 0222    CBC Auto Differential [[id number]]  (Abnormal) Collected: 08/22/23 0151    Specimen: Blood Updated: 08/22/23 0225     WBC 6.26 10*3/mm3      RBC 3.06 10*6/mm3      Hemoglobin 10.8 g/dL      Hematocrit 31.2 %      .0 fL      MCH 35.3 pg      MCHC 34.6 g/dL      RDW 15.0 %      RDW-SD 55.6 fl      MPV 8.8 fL      Platelets 318 10*3/mm3      Neutrophil % 58.6 %      Lymphocyte % 28.4 %      Monocyte % 9.1 %      Eosinophil % 2.6 %      Basophil % 1.0 %      Immature Grans % 0.3 %      Neutrophils, Absolute 3.67 10*3/mm3      Lymphocytes, Absolute 1.78 10*3/mm3      Monocytes, Absolute 0.57 10*3/mm3      Eosinophils, Absolute 0.16 10*3/mm3      Basophils, Absolute 0.06 10*3/mm3      Immature  Grans, Absolute 0.02 10*3/mm3      nRBC 0.0 /100 WBC     Hemoglobin A1c [[id number]]  (Normal) Collected: 08/22/23 0151    Specimen: Blood Updated: 08/22/23 1703     Hemoglobin A1C 5.40 %     Narrative:      Hemoglobin A1C Ranges:    Increased Risk for Diabetes  5.7% to 6.4%  Diabetes                     >= 6.5%  Diabetic Goal                < 7.0%    Vitamin B12 [[id number]]  (Normal) Collected: 08/22/23 0151    Specimen: Blood Updated: 08/22/23 1659     Vitamin B-12 340 pg/mL     Narrative:      Results may be falsely increased if patient taking Biotin.      Folate [[id number]]  (Normal) Collected: 08/22/23 0151    Specimen: Blood Updated: 08/22/23 1659     Folate 10.10 ng/mL     Narrative:      Results may be falsely increased if patient taking Biotin.      Blood Culture - Blood, Arm, Left [[id number]] Collected: 08/22/23 0152    Specimen: Blood from Arm, Left Updated: 08/22/23 0222    High Sensitivity Troponin T 2Hr [[id number]]  (Abnormal) Collected: 08/22/23 0438    Specimen: Blood Updated: 08/22/23 0531     HS Troponin T 125 ng/L      Troponin T Delta -26 ng/L     Narrative:      High Sensitive Troponin T Reference Range:  <10.0 ng/L- Negative Female for AMI  <15.0 ng/L- Negative Male for AMI  >=10 - Abnormal Female indicating possible myocardial injury.  >=15 - Abnormal Male indicating possible myocardial injury.   Clinicians would have to utilize clinical acumen, EKG, Troponin, and serial changes to determine if it is an Acute Myocardial Infarction or myocardial injury due to an underlying chronic condition.         Lipid Panel [[id number]] Collected: 08/22/23 0438    Specimen: Blood Updated: 08/22/23 1101     Total Cholesterol 128 mg/dL      Triglycerides 60 mg/dL      HDL Cholesterol 57 mg/dL      LDL Cholesterol  58 mg/dL      VLDL Cholesterol 13 mg/dL      LDL/HDL Ratio 1.04    Narrative:      Cholesterol Reference Ranges  (U.S. Department of Health and Human Services ATP III Classifications)    Desirable           <200 mg/dL  Borderline High    200-239 mg/dL  High Risk          >240 mg/dL      Triglyceride Reference Ranges  (U.S. Department of Health and Human Services ATP III Classifications)    Normal           <150 mg/dL  Borderline High  150-199 mg/dL  High             200-499 mg/dL  Very High        >500 mg/dL    HDL Reference Ranges  (U.S. Department of Health and Human Services ATP III Classifications)    Low     <40 mg/dl (major risk factor for CHD)  High    >60 mg/dl ('negative' risk factor for CHD)        LDL Reference Ranges  (U.S. Department of Health and Human Services ATP III Classifications)    Optimal          <100 mg/dL  Near Optimal     100-129 mg/dL  Borderline High  130-159 mg/dL  High             160-189 mg/dL  Very High        >189 mg/dL    CBC Auto Differential [[id number]]  (Abnormal) Collected: 08/22/23 1134    Specimen: Blood Updated: 08/22/23 1155     WBC 4.43 10*3/mm3      RBC 2.99 10*6/mm3      Hemoglobin 10.8 g/dL      Hematocrit 30.0 %      .3 fL      MCH 36.1 pg      MCHC 36.0 g/dL      RDW 15.0 %      RDW-SD 55.7 fl      MPV 8.6 fL      Platelets 290 10*3/mm3      Neutrophil % 53.5 %      Lymphocyte % 34.8 %      Monocyte % 9.0 %      Eosinophil % 1.8 %      Basophil % 0.7 %      Immature Grans % 0.2 %      Neutrophils, Absolute 2.37 10*3/mm3      Lymphocytes, Absolute 1.54 10*3/mm3      Monocytes, Absolute 0.40 10*3/mm3      Eosinophils, Absolute 0.08 10*3/mm3      Basophils, Absolute 0.03 10*3/mm3      Immature Grans, Absolute 0.01 10*3/mm3      nRBC 0.0 /100 WBC     Comprehensive Metabolic Panel [[id number]]  (Abnormal) Collected: 08/22/23 1134    Specimen: Blood Updated: 08/22/23 1213     Glucose 68 mg/dL      BUN 9 mg/dL      Creatinine 1.07 mg/dL      Sodium 131 mmol/L      Potassium 3.3 mmol/L      Chloride 94 mmol/L      CO2 25.1 mmol/L      Calcium 8.3 mg/dL      Total Protein 7.2 g/dL      Albumin 4.2 g/dL      ALT (SGPT) 17 U/L      AST (SGOT) 73 U/L      Alkaline  Phosphatase 105 U/L      Total Bilirubin 0.6 mg/dL      Globulin 3.0 gm/dL      A/G Ratio 1.4 g/dL      BUN/Creatinine Ratio 8.4     Anion Gap 11.9 mmol/L      eGFR 84.0 mL/min/1.73     Narrative:      GFR Normal >60  Chronic Kidney Disease <60  Kidney Failure <15               Imaging:    Adult Transthoracic Echo Complete W/ Cont if Necessary Per Protocol    Result Date: 8/22/2023    The left ventricular cavity is severely dilated.   Left ventricular systolic function is severely decreased. Calculated left ventricular EF = 10.3%   Left ventricular diastolic function is consistent with (grade II w/high LAP) pseudonormalization.   Mildly reduced right ventricular systolic function noted.   The left atrial cavity is severely dilated.   Estimated right ventricular systolic pressure from tricuspid regurgitation is normal (<35 mmHg).   There is a small (<1cm) pericardial effusion posteriorly.     XR Chest 1 View    Result Date: 8/22/2023  PROCEDURE: XR CHEST 1 VW  COMPARISON: None.  INDICATIONS: shortness of breath  FINDINGS:  A single AP upright portable chest radiograph reveals mild-to-moderate cardiomegaly, mild pulmonary edema with vascular congestion, minimal, if any, pleural effusion, no pneumothorax, and an old healed distal right clavicle fracture.  Mild thoracic aortic atherosclerotic change is possible.  There may be chronic calcified granulomatous disease of the chest.         1. There is mild-to-moderate cardiomegaly.   2. Mild pulmonary edema with vascular congestion is suggested.  Mild subsegmental atelectasis may involve the lung bases.  Pneumonia cannot be excluded entirely but is thought to be less likely.  3. Please see above comments for further detail.      Please note that portions of this note were completed with a voice recognition program.  CORINA OLVERA JR, MD       Electronically Signed and Approved By: CORINA OLVERA JR, MD on 8/22/2023 at 3:05                 Differential Diagnosis and  Discussion:    Dyspnea: Differential diagnosis includes but is not limited to metabolic acidosis, neurological disorders, psychogenic, asthma, pneumothorax, upper airway obstruction, COPD, pneumonia, noncardiogenic pulmonary edema, interstitial lung disease, anemia, congestive heart failure, and pulmonary embolism  Edema: Based on the patient's history, signs, and symptoms, the differential diagnosis includes but is not limited to heart failure, kidney disease, liver disease, venous insufficiency, lymphedema, pregnancy, medications, allergic reactions.    All labs were reviewed and interpreted by me.  All X-rays impressions were independently interpreted by me.  EKG was interpreted by me.    MDM  Number of Diagnoses or Management Options  Diagnosis management comments: Patient presented to the emergency department with bilateral lower extremity edema and anasarca. EKG shows sinus rhythm no acute ST elevation.  Labs showed elevated troponin with elevated BNP of 2200.  Patient denies any known history of heart or liver disease.  Patient was given Lasix.  I discussed patient with hospitalist and will admit for further care.       Amount and/or Complexity of Data Reviewed  Clinical lab tests: reviewed  Tests in the radiology section of CPT®: reviewed  Review and summarize past medical records: yes  Independent visualization of images, tracings, or specimens: yes    Risk of Complications, Morbidity, and/or Mortality  Presenting problems: moderate  Management options: moderate         Critical Care Note: Total Critical Care time of 35 minutes. Total critical care time documented does not include time spent on separately billed procedures for services of nurses or physician assistants. I personally saw and examined the patient. I have reviewed all diagnostic interpretations and treatment plans as written. I was present for the key portions of any procedures performed and the inclusive time noted in any critical care  statement. Critical care time includes patient management by me, time spent at the patients bedside,  time to review lab and imaging results, discussing patient care, documentation in the medical record, and time spent with family or caregiver.    Patient Care Considerations:    CT CHEST: I considered ordering a CT scan of the chest, however not emergently indicated will obtain as in patient if needed      Consultants/Shared Management Plan:    Hospitalist: I have discussed the case with Dr Gibson who agrees to accept the patient for admission.    Social Determinants of Health:    Patient is independent, reliable, and has access to care.       Disposition and Care Coordination:    Admit:   Through independent evaluation of the patient's history, physical, and imperical data, the patient meets criteria for observation/admission to the hospital.        Final diagnoses:   Acute on chronic congestive heart failure, unspecified heart failure type        ED Disposition       ED Disposition   Decision to Admit    Condition   --    Comment   Level of Care: Telemetry [5]   Diagnosis: Acute exacerbation of CHF (congestive heart failure) [927363]   Certification: I Certify That Inpatient Hospital Services Are Medically Necessary For Greater Than 2 Midnights                 This medical record created using voice recognition software.             Mike Zelaya MD  08/22/23 5341

## 2023-08-22 NOTE — SIGNIFICANT NOTE
08/22/23 1130   Coping/Psychosocial   Observed Emotional State calm;cooperative   Verbalized Emotional State hopefulness   Trust Relationship/Rapport empathic listening provided   Involvement in Care interacting with patient   Additional Documentation Spiritual Care (Group)   Spiritual Care   Use of Spiritual Resources non-Orthodoxy use of spiritual care   Spiritual Care Source  initiative   Spiritual Care Follow-Up follow-up, none required as presently assessed   Response to Spiritual Care receptive of support;engaged in conversation   Spiritual Care Interventions supportive conversation provided   Spiritual Care Visit Type initial   Receptivity to Spiritual Care visit welcomed

## 2023-08-22 NOTE — Clinical Note
The physician has confirmed that the patient has been reassessed and is appropriate for moderate sedation 4 = No assist / stand by assistance

## 2023-08-22 NOTE — ED TRIAGE NOTES
"Patient to Ed from home with c/o: patient came from [place] via vehicle around July 8. Since then he has had bilateral leg swelling. Patient stated he is having pain in both legs. Pain and swelling have increased with oozing from both legs. Patient also has swelling to both hands. Patient complains of SOA with no chest pain. Patient has family hx of CHF. Patient has increased SOA with physical exertion. Patient states he sleep with pillows to prop him up, states he \"can't breathe\" when he lays flat.   "

## 2023-08-22 NOTE — CONSULTS
[facility]   Cardiology Consult Note    Patient Name: Greyson Schofield  : 1970  MRN: [id number]  Primary Care Physician:  Bertha Doe DO  Referring Physician: No ref. provider found  Date of admission: 2023    Subjective   Subjective     Reason for Consult: CHF    HPI:  Greyson Schofield is a 53 y.o. male with past medical history significant for alcohol abuse, traumatic brain injury, presented to hospital with shortness of breath and bilateral lower extremity swelling and pain.  His symptoms started about 8 to 10 days ago and have been progressive.  He gets short of breath with walking short distance.  He has no chest discomfort, palpitations, presyncope or syncope.  He drinks 4 shots of alcohol every day and additional beer on the weekends.  He denies previous cardiac history.  He denies history of CAD, CHF or arrhythmias.  However, he reports family history of heart disease/heart failure.    Review of Systems   All systems were reviewed and negative except as above    Personal History     Past Medical History:   Diagnosis Date    Arthritis         Family History: family history is not on file. Otherwise pertinent FHx was reviewed and not pertinent to current issue.    Social History:  reports that he has been smoking cigarettes. He has been smoking an average of .5 packs per day. He does not have any smokeless tobacco history on file. He reports current alcohol use of about 4.0 standard drinks per week. He reports current drug use. Drug: Marijuana.    Home Medications:  Caffeine-Magnesium Salicylate    Allergies:  No Known Allergies    Objective    Objective     Vitals:   Temp:  [97.2 °F (36.2 °C)-98.2 °F (36.8 °C)] 97.2 °F (36.2 °C)  Heart Rate:  [81-88] 83  Resp:  [18] 18  BP: (108-122)/(76-97) 115/85      Physical Exam:   Constitutional: Awake, alert, No acute distress    Eyes: PERRLA, sclerae anicteric, no conjunctival injection   HENT: NCAT, mucous membranes moist   Neck: Supple, no  thyromegaly, no lymphadenopathy, trachea midline   Respiratory: Clear to auscultation bilaterally, nonlabored respirations    Cardiovascular: RRR, no murmurs, rubs, or gallops, palpable pedal pulses bilaterally   Gastrointestinal: Positive bowel sounds, soft, nontender, mildly distended   Musculoskeletal: 2+ bilateral leg edema, no clubbing or cyanosis to extremities   Psychiatric: Appropriate affect, cooperative   Neurologic: Oriented x 3, strength symmetric in all extremities, Cranial Nerves grossly intact to confrontation, speech clear   Skin: No rashes     Result Review    Result Review:  I have personally reviewed the results from the time of this admission to 8/22/2023 13:27 EDT and agree with these findings:  [x]  Laboratory  []  Microbiology  [x]  Radiology  [x]  EKG/Telemetry   [x]  Cardiology/Vascular   []  Pathology  [x]  Old records  []  Other:  Most notable findings include:     CMP          8/22/2023    01:51 8/22/2023    11:34   CMP   Glucose 78  68    BUN 11  9    Creatinine 1.15  1.07    EGFR 77.1  84.0    Sodium 132  131    Potassium 4.0  3.3    Chloride 96  94    Calcium 8.9  8.3    Total Protein 7.4  7.2    Albumin 4.4  4.2    Globulin 3.0  3.0    Total Bilirubin 0.6  0.6    Alkaline Phosphatase 111  105    AST (SGOT) 78  73    ALT (SGPT) 18  17    Albumin/Globulin Ratio 1.5  1.4    BUN/Creatinine Ratio 9.6  8.4    Anion Gap 11.5  11.9       CBC          8/22/2023    01:51 8/22/2023    11:34   CBC   WBC 6.26  4.43    RBC 3.06  2.99    Hemoglobin 10.8  10.8    Hematocrit 31.2  30.0    .0  100.3    MCH 35.3  36.1    MCHC 34.6  36.0    RDW 15.0  15.0    Platelets 318  290       Lab Results   Component Value Date    TROPONINT 125 (C) 08/22/2023         Assessment & Plan   Assessment / Plan     Brief Patient Summary:  Greyson Schofield is a 53 y.o. male with:    Anasarca secondary to acute systolic congestive heart failure exacerbation, newly diagnosed, LVEF around 10 to 15% by echo.  Dilated,  apathy with severely enlarged left ventricle, more likely related to alcohol abuse.  Mildly elevated troponin without angina or acute ischemic ST-T changes.  This is more like related to CHF exacerbation.  History of traumatic brain injury 7 years ago.  Active smoking.    Plan:   Continue furosemide 40 mg IV twice daily.  Dose will be adjusted to aim for -2 to 3 L/day.  Maintain potassium around 4.0 magnesium around 2.0.  Monitor kidney function.  Will start small dose beta-blocker, spironolactone and Farxiga.  Blood pressure remains stable, will start Entresto.  He will need cardiac stress test as an outpatient.  Alcohol and smoking cessation counseling was provided.  Other issues as per primary team.    Thank you for the consultation.  We will continue to follow along with you.  Please call with any questions.      Electronically signed by Jomar Lynch MD, 08/22/23, 1:27 PM EDT.

## 2023-08-22 NOTE — PLAN OF CARE
Goal Outcome Evaluation:  Plan of Care Reviewed With: patient      Patient is alert and oriented x 4. NSR. VSS. On RA. Patient given PRN pain medication for complaints of leg pain. Patient has scored 0 on CIWA during each assessment. Wound care consulted - new orders. Will continue plan of care.  Progress: no change

## 2023-08-22 NOTE — PROGRESS NOTES
[facility]   Hospitalist Progress Note  Date: 2023  Patient Name: Greyson Schofield  : 1970  MRN: [id number]  Date of admission: 2023      Subjective   Subjective     Chief Complaint: Shortness of air lower extremity edema    Summary: Patient is a 53-year-old male past medical history of traumatic brain injury no other known medical conditions who presents to the ED for evaluation of bilateral lower extremity swelling that is gotten progressively worse over the past 4 days as well as shortness of breath and orthopnea patient states he drove from [place] to [place] in early July had mild bilateral lower extremity edema then.  Patient also states he drinks 4-5 shots daily sometimes more evaluated the emergency department BNP elevated chest x-ray concerning for vascular congestion/pulmonary edema troponin elevated given IV Lasix has been admitted to the hospitalist service cardiology consulted.      Interval Followup: Patient seen and examined resting comfortably in bed not currently short of air but remains very edematous evidently has been diuresing very well since his dose of IV Lasix continue with Lasix 40 IV twice daily checking 2D echocardiogram checking venous Dopplers of lower extremity additionally patient's abdomen is rather distended given his chronic alcohol use will check abdominal ultrasound to rule out ascites    Review of systems:  All systems reviewed and negative except the following: Patient complains of generalized weakness fatigue shortness of air orthopnea lower extremity edema abdominal distention      Objective   Objective     Vitals:   Temp:  [97.2 °F (36.2 °C)-98.2 °F (36.8 °C)] 97.2 °F (36.2 °C)  Heart Rate:  [81-88] 83  Resp:  [18] 18  BP: (108-122)/(76-97) 115/85    Physical Exam   Constitutional: Awake alert oriented no acute distress  Respiratory: Diminished  GI: Abdomen distended and taut  Cardiovascular: RRR  Extremities: Lower extremity edema noted #anasarca  with swelling to his thighs    Result Review    Result Review:  I have personally reviewed the results from the time of this admission to 8/22/2023 12:47 EDT and agree with these findings:  [x]  Laboratory  []  Microbiology  []  Radiology  []  EKG/Telemetry   []  Cardiology/Vascular   []  Pathology  []  Old records  []  Other:    Assessment & Plan   Assessment / Plan   Assessment:    Acute congestive heart failure ejection fraction unknown  Anasarca  Elevated troponin likely NSTEMI type II from cardiac strain from CHF exacerbation  Abdominal distention  Chronic alcohol use  Macrocytosis  Anemia    Plan:    Continue with diuresis IV Lasix 40 twice daily  Strict I's and O's and daily weights  Low-sodium diet  Check 2D echocardiogram  Checking bilateral lower extremity Dopplers to rule out DVT  Checking abdominal ultrasound to rule out ascites  Cardiology consulted recommendations appreciated  Checking B12 folate levels as well as iron profile  Rechecking chemistries to trend serum creatinine and electrolytes in a.m.  Follow CBC daily  CIWA protocol for possible withdrawal symptoms given patient's daily alcohol use  Further treatment contingent upon his hospital course     Discussed plan with RN.    DVT prophylaxis:  Medical DVT prophylaxis orders are present.    CODE STATUS:   Level Of Support Discussed With: Patient  Code Status (Patient has no pulse and is not breathing): CPR (Attempt to Resuscitate)  Medical Interventions (Patient has pulse or is breathing): Full Support        Electronically signed by LIDA Agosto, 08/22/23, 12:47 PM EDT.    Attending Attestation  I reviewed the above documentation and evaluation and discussed the care plan with TYRA Spencer PA-C, I agree with his findings and plan as documented.  Electronically signed by Orlando Corey MD, 08/22/23, 3:17 PM EDT..  Portions of this documentation were transcribed electronically from a voice recognition software.  I confirm all data  accurately represents the service(s) I performed at today's visit.

## 2023-08-22 NOTE — SIGNIFICANT NOTE
Wound Eval / Progress Noted    KATY Rebolledo     Patient Name: Greysno Schofield  : 1970  MRN: [id number]  Today's Date: 2023                 Admit Date: 2023    Visit Dx:  No diagnosis found.      Acute exacerbation of CHF (congestive heart failure)        Past Medical History:   Diagnosis Date    Arthritis         Past Surgical History:   Procedure Laterality Date    APPENDECTOMY           Physical Assessment:       23 1425   Skin   Skin WDL X;color;characteristics;all   Skin Color/Characteristics honey;other (see comments)  (bilateral lower legs)   Skin Temperature hot   Skin Moisture dry   Skin Elasticity firm   Skin Integrity intact;other (see comments)  (edema, redness)         Wound Check / Follow-up: Patient seen today for a wound consult. Patient reports that his legs have progressively got worse over the last 4-5 days with redness and edema; reports weeping when he was wearing his compression stockings. Bilateral lower legs with significant edema and redness, skin is taut and shiny; hot to touch. No open tissue or blistering noted at this time. Recommend daily cleansing with CHG wash and application of Aquaphor to the bilateral legs and feet. Elevate lower legs with multiple pillows at all times.     Impression: edema, redness     Short term goals:  regain skin integrity, pressure reduction, skin protection, daily cleansing and topical treatment    Selena Busby RN    2023    15:04 EDT

## 2023-08-22 NOTE — Clinical Note
Hemostasis started on the right femoral vein. Mynx was used in achieving hemostasis. Closure device deployed in the vessel. Hemostasis achieved successfully.

## 2023-08-23 LAB
ALBUMIN SERPL-MCNC: 4.1 G/DL (ref 3.5–5.2)
ALP SERPL-CCNC: 109 U/L (ref 39–117)
ALT SERPL W P-5'-P-CCNC: 16 U/L (ref 1–41)
ANION GAP SERPL CALCULATED.3IONS-SCNC: 13.4 MMOL/L (ref 5–15)
AST SERPL-CCNC: 67 U/L (ref 1–40)
BH CV LOWER VASCULAR LEFT COMMON FEMORAL AUGMENT: NORMAL
BH CV LOWER VASCULAR LEFT COMMON FEMORAL COMPETENT: NORMAL
BH CV LOWER VASCULAR LEFT COMMON FEMORAL COMPRESS: NORMAL
BH CV LOWER VASCULAR LEFT COMMON FEMORAL PHASIC: NORMAL
BH CV LOWER VASCULAR LEFT COMMON FEMORAL SPONT: NORMAL
BH CV LOWER VASCULAR RIGHT COMMON FEMORAL AUGMENT: NORMAL
BH CV LOWER VASCULAR RIGHT COMMON FEMORAL COMPETENT: NORMAL
BH CV LOWER VASCULAR RIGHT COMMON FEMORAL COMPRESS: NORMAL
BH CV LOWER VASCULAR RIGHT COMMON FEMORAL PHASIC: NORMAL
BH CV LOWER VASCULAR RIGHT COMMON FEMORAL SPONT: NORMAL
BH CV LOWER VASCULAR RIGHT DISTAL FEMORAL AUGMENT: NORMAL
BH CV LOWER VASCULAR RIGHT DISTAL FEMORAL COMPETENT: NORMAL
BH CV LOWER VASCULAR RIGHT DISTAL FEMORAL COMPRESS: NORMAL
BH CV LOWER VASCULAR RIGHT DISTAL FEMORAL PHASIC: NORMAL
BH CV LOWER VASCULAR RIGHT DISTAL FEMORAL SPONT: NORMAL
BH CV LOWER VASCULAR RIGHT GASTRONEMIUS COMPRESS: NORMAL
BH CV LOWER VASCULAR RIGHT GREATER SAPH AK COMPRESS: NORMAL
BH CV LOWER VASCULAR RIGHT GREATER SAPH BK COMPRESS: NORMAL
BH CV LOWER VASCULAR RIGHT LESSER SAPH COMPRESS: NORMAL
BH CV LOWER VASCULAR RIGHT MID FEMORAL COMPRESS: NORMAL
BH CV LOWER VASCULAR RIGHT PERONEAL COMPRESS: NORMAL
BH CV LOWER VASCULAR RIGHT POPLITEAL AUGMENT: NORMAL
BH CV LOWER VASCULAR RIGHT POPLITEAL COMPETENT: NORMAL
BH CV LOWER VASCULAR RIGHT POPLITEAL COMPRESS: NORMAL
BH CV LOWER VASCULAR RIGHT POPLITEAL PHASIC: NORMAL
BH CV LOWER VASCULAR RIGHT POPLITEAL SPONT: NORMAL
BH CV LOWER VASCULAR RIGHT POSTERIOR TIBIAL AUGMENT: NORMAL
BH CV LOWER VASCULAR RIGHT POSTERIOR TIBIAL COMPRESS: NORMAL
BH CV LOWER VASCULAR RIGHT PROXIMAL FEMORAL COMPRESS: NORMAL
BILIRUB CONJ SERPL-MCNC: 0.3 MG/DL (ref 0–0.3)
BILIRUB INDIRECT SERPL-MCNC: 0.5 MG/DL
BILIRUB SERPL-MCNC: 0.8 MG/DL (ref 0–1.2)
BILIRUB UR QL STRIP: NEGATIVE
BUN SERPL-MCNC: 10 MG/DL (ref 6–20)
BUN/CREAT SERPL: 8.5 (ref 7–25)
CALCIUM SPEC-SCNC: 8.4 MG/DL (ref 8.6–10.5)
CHLORIDE SERPL-SCNC: 91 MMOL/L (ref 98–107)
CLARITY UR: CLEAR
CO2 SERPL-SCNC: 26.6 MMOL/L (ref 22–29)
COLOR UR: YELLOW
CREAT SERPL-MCNC: 1.18 MG/DL (ref 0.76–1.27)
DEPRECATED RDW RBC AUTO: 57 FL (ref 37–54)
EGFRCR SERPLBLD CKD-EPI 2021: 74.7 ML/MIN/1.73
ERYTHROCYTE [DISTWIDTH] IN BLOOD BY AUTOMATED COUNT: 15.1 % (ref 12.3–15.4)
FOLATE SERPL-MCNC: >20 NG/ML (ref 4.78–24.2)
GLUCOSE SERPL-MCNC: 80 MG/DL (ref 65–99)
GLUCOSE UR STRIP-MCNC: ABNORMAL MG/DL
HCT VFR BLD AUTO: 32.5 % (ref 37.5–51)
HGB BLD-MCNC: 11.3 G/DL (ref 13–17.7)
HGB UR QL STRIP.AUTO: NEGATIVE
KETONES UR QL STRIP: ABNORMAL
LEUKOCYTE ESTERASE UR QL STRIP.AUTO: NEGATIVE
MAGNESIUM SERPL-MCNC: 1.6 MG/DL (ref 1.6–2.6)
MCH RBC QN AUTO: 35.4 PG (ref 26.6–33)
MCHC RBC AUTO-ENTMCNC: 34.8 G/DL (ref 31.5–35.7)
MCV RBC AUTO: 101.9 FL (ref 79–97)
NITRITE UR QL STRIP: NEGATIVE
PH UR STRIP.AUTO: 6.5 [PH] (ref 5–8)
PHOSPHATE SERPL-MCNC: 3.3 MG/DL (ref 2.5–4.5)
PLATELET # BLD AUTO: 319 10*3/MM3 (ref 140–450)
PMV BLD AUTO: 8.7 FL (ref 6–12)
POTASSIUM SERPL-SCNC: 3.8 MMOL/L (ref 3.5–5.2)
PROT SERPL-MCNC: 7.2 G/DL (ref 6–8.5)
PROT UR QL STRIP: NEGATIVE
RBC # BLD AUTO: 3.19 10*6/MM3 (ref 4.14–5.8)
SODIUM SERPL-SCNC: 131 MMOL/L (ref 136–145)
SP GR UR STRIP: 1.01 (ref 1–1.03)
UROBILINOGEN UR QL STRIP: ABNORMAL
VIT B12 BLD-MCNC: 447 PG/ML (ref 211–946)
WBC NRBC COR # BLD: 6.6 10*3/MM3 (ref 3.4–10.8)

## 2023-08-23 PROCEDURE — 97165 OT EVAL LOW COMPLEX 30 MIN: CPT

## 2023-08-23 PROCEDURE — 84100 ASSAY OF PHOSPHORUS: CPT | Performed by: PHYSICIAN ASSISTANT

## 2023-08-23 PROCEDURE — 25010000002 FUROSEMIDE PER 20 MG: Performed by: PHYSICIAN ASSISTANT

## 2023-08-23 PROCEDURE — 25010000002 MORPHINE PER 10 MG: Performed by: FAMILY MEDICINE

## 2023-08-23 PROCEDURE — 80048 BASIC METABOLIC PNL TOTAL CA: CPT | Performed by: PHYSICIAN ASSISTANT

## 2023-08-23 PROCEDURE — 25010000002 CYANOCOBALAMIN PER 1000 MCG: Performed by: FAMILY MEDICINE

## 2023-08-23 PROCEDURE — 94761 N-INVAS EAR/PLS OXIMETRY MLT: CPT

## 2023-08-23 PROCEDURE — 97161 PT EVAL LOW COMPLEX 20 MIN: CPT

## 2023-08-23 PROCEDURE — 85027 COMPLETE CBC AUTOMATED: CPT | Performed by: PHYSICIAN ASSISTANT

## 2023-08-23 PROCEDURE — 80076 HEPATIC FUNCTION PANEL: CPT | Performed by: PHYSICIAN ASSISTANT

## 2023-08-23 PROCEDURE — 99233 SBSQ HOSP IP/OBS HIGH 50: CPT | Performed by: FAMILY MEDICINE

## 2023-08-23 PROCEDURE — 82746 ASSAY OF FOLIC ACID SERUM: CPT | Performed by: PHYSICIAN ASSISTANT

## 2023-08-23 PROCEDURE — 25010000002 ONDANSETRON PER 1 MG: Performed by: FAMILY MEDICINE

## 2023-08-23 PROCEDURE — 25010000002 HEPARIN (PORCINE) PER 1000 UNITS: Performed by: STUDENT IN AN ORGANIZED HEALTH CARE EDUCATION/TRAINING PROGRAM

## 2023-08-23 PROCEDURE — 82607 VITAMIN B-12: CPT | Performed by: PHYSICIAN ASSISTANT

## 2023-08-23 PROCEDURE — 94799 UNLISTED PULMONARY SVC/PX: CPT

## 2023-08-23 PROCEDURE — 83735 ASSAY OF MAGNESIUM: CPT | Performed by: PHYSICIAN ASSISTANT

## 2023-08-23 PROCEDURE — 81003 URINALYSIS AUTO W/O SCOPE: CPT | Performed by: STUDENT IN AN ORGANIZED HEALTH CARE EDUCATION/TRAINING PROGRAM

## 2023-08-23 PROCEDURE — 99232 SBSQ HOSP IP/OBS MODERATE 35: CPT | Performed by: INTERNAL MEDICINE

## 2023-08-23 RX ORDER — CYANOCOBALAMIN 1000 UG/ML
1000 INJECTION, SOLUTION INTRAMUSCULAR; SUBCUTANEOUS DAILY
Status: COMPLETED | OUTPATIENT
Start: 2023-08-23 | End: 2023-08-25

## 2023-08-23 RX ORDER — ONDANSETRON 2 MG/ML
4 INJECTION INTRAMUSCULAR; INTRAVENOUS EVERY 6 HOURS PRN
Status: DISCONTINUED | OUTPATIENT
Start: 2023-08-23 | End: 2023-08-31 | Stop reason: HOSPADM

## 2023-08-23 RX ADMIN — HYDROCODONE BITARTRATE AND ACETAMINOPHEN 1 TABLET: 10; 325 TABLET ORAL at 18:24

## 2023-08-23 RX ADMIN — EMPAGLIFLOZIN 10 MG: 10 TABLET, FILM COATED ORAL at 08:04

## 2023-08-23 RX ADMIN — Medication 10 ML: at 21:18

## 2023-08-23 RX ADMIN — Medication 1 TABLET: at 08:03

## 2023-08-23 RX ADMIN — HEPARIN SODIUM 5000 UNITS: 5000 INJECTION INTRAVENOUS; SUBCUTANEOUS at 06:17

## 2023-08-23 RX ADMIN — SPIRONOLACTONE 25 MG: 25 TABLET ORAL at 08:03

## 2023-08-23 RX ADMIN — FUROSEMIDE 40 MG: 10 INJECTION, SOLUTION INTRAMUSCULAR; INTRAVENOUS at 18:16

## 2023-08-23 RX ADMIN — HYDROCODONE BITARTRATE AND ACETAMINOPHEN 1 TABLET: 10; 325 TABLET ORAL at 06:17

## 2023-08-23 RX ADMIN — Medication 10 ML: at 08:03

## 2023-08-23 RX ADMIN — HEPARIN SODIUM 5000 UNITS: 5000 INJECTION INTRAVENOUS; SUBCUTANEOUS at 21:18

## 2023-08-23 RX ADMIN — FOLIC ACID 1 MG: 1 TABLET ORAL at 08:04

## 2023-08-23 RX ADMIN — METOPROLOL SUCCINATE 25 MG: 25 TABLET, EXTENDED RELEASE ORAL at 08:03

## 2023-08-23 RX ADMIN — CYANOCOBALAMIN 1000 MCG: 1000 INJECTION, SOLUTION INTRAMUSCULAR at 21:18

## 2023-08-23 RX ADMIN — Medication 100 MG: at 08:03

## 2023-08-23 RX ADMIN — WHITE PETROLATUM 1 APPLICATION: 1.75 OINTMENT TOPICAL at 16:02

## 2023-08-23 RX ADMIN — FUROSEMIDE 40 MG: 10 INJECTION, SOLUTION INTRAMUSCULAR; INTRAVENOUS at 08:02

## 2023-08-23 RX ADMIN — MORPHINE SULFATE 2 MG: 2 INJECTION, SOLUTION INTRAMUSCULAR; INTRAVENOUS at 07:32

## 2023-08-23 RX ADMIN — MORPHINE SULFATE 2 MG: 2 INJECTION, SOLUTION INTRAMUSCULAR; INTRAVENOUS at 15:59

## 2023-08-23 RX ADMIN — HEPARIN SODIUM 5000 UNITS: 5000 INJECTION INTRAVENOUS; SUBCUTANEOUS at 15:59

## 2023-08-23 RX ADMIN — ONDANSETRON 4 MG: 2 INJECTION INTRAMUSCULAR; INTRAVENOUS at 12:37

## 2023-08-23 RX ADMIN — SACUBITRIL AND VALSARTAN 0.5 TABLET: 24; 26 TABLET, FILM COATED ORAL at 21:17

## 2023-08-23 NOTE — PROGRESS NOTES
[facility]     Cardiology Progress Note    Patient Name: Greyson Schofield  : 1970  MRN: [id number]  Primary Care Physician:  Bertha Doe DO  Date of admission: 2023    Subjective   Subjective     No acute events overnight.  He is feeling well this morning.  He reports improvement of his shortness of breath.  He has no chest discomfort or other complaints.  He continues to have lower extremity swelling which appears to be improving.    Review of Systems   All systems were reviewed and negative except as above    Objective   Objective     Vitals:   Temp:  [97.2 °F (36.2 °C)-98.4 °F (36.9 °C)] 97.5 °F (36.4 °C)  Heart Rate:  [] 85  Resp:  [18-20] 20  BP: (100-125)/(52-86) 125/61  Physical Exam                  Constitutional: Awake, alert, No acute distress               Eyes: PERRLA, sclerae anicteric, no conjunctival injection              HENT: NCAT, mucous membranes moist              Neck: Supple, no thyromegaly, no lymphadenopathy, trachea midline              Respiratory: Clear to auscultation bilaterally, nonlabored respirations               Cardiovascular: RRR, no murmurs, rubs, or gallops, palpable pedal pulses bilaterally              Gastrointestinal: Positive bowel sounds, soft, nontender, mildly distended              Musculoskeletal: 2+ bilateral leg edema, no clubbing or cyanosis to extremities              Psychiatric: Appropriate affect, cooperative              Neurologic: Oriented x 3, strength symmetric in all extremities, Cranial Nerves grossly intact to confrontation, speech clear              Skin: No rashes     Scheduled Meds:empagliflozin, 10 mg, Oral, Daily  folic acid, 1 mg, Oral, Daily  furosemide, 40 mg, Intravenous, BID  heparin (porcine), 5,000 Units, Subcutaneous, Q8H  metoprolol succinate XL, 25 mg, Oral, Q24H  mineral oil-hydrophilic petrolatum, 1 application , Topical, Daily  multivitamin with minerals, 1 tablet, Oral, Daily  sodium chloride, 10 mL,  Intravenous, Q12H  spironolactone, 25 mg, Oral, Daily  thiamine, 100 mg, Oral, Daily      Continuous Infusions:        Result Review    Result Review:  I have personally reviewed the results from the time of this admission to 8/23/2023 08:40 EDT and agree with these findings:  [x]  Laboratory  []  Microbiology  [x]  Radiology  [x]  EKG/Telemetry   [x]  Cardiology/Vascular   []  Pathology  []  Old records  []  Other:  Most notable findings include:     CBC          8/22/2023    01:51 8/22/2023    11:34 8/23/2023    04:54   CBC   WBC 6.26  4.43  6.60    RBC 3.06  2.99  3.19    Hemoglobin 10.8  10.8  11.3    Hematocrit 31.2  30.0  32.5    .0  100.3  101.9    MCH 35.3  36.1  35.4    MCHC 34.6  36.0  34.8    RDW 15.0  15.0  15.1    Platelets 318  290  319      CMP          8/22/2023    01:51 8/22/2023    11:34 8/23/2023    04:54   CMP   Glucose 78  68  80    BUN 11  9  10    Creatinine 1.15  1.07  1.18    EGFR 77.1  84.0  74.7    Sodium 132  131  131    Potassium 4.0  3.3  3.8    Chloride 96  94  91    Calcium 8.9  8.3  8.4    Total Protein 7.4  7.2  7.2    Albumin 4.4  4.2  4.1    Globulin 3.0  3.0     Total Bilirubin 0.6  0.6  0.8    Alkaline Phosphatase 111  105  109    AST (SGOT) 78  73  67    ALT (SGPT) 18  17  16    Albumin/Globulin Ratio 1.5  1.4     BUN/Creatinine Ratio 9.6  8.4  8.5    Anion Gap 11.5  11.9  13.4       CARDIAC LABS:      Lab 08/22/23  0438 08/22/23  0151   PROBNP  --  2,246.0*   HSTROP T 125* 151*        Assessment & Plan   Assessment / Plan     Brief Patient Summary:  Greyson Schofield is a 53 y.o. male with:     Anasarca secondary to acute systolic congestive heart failure exacerbation, newly diagnosed, LVEF around 10 to 15% by echo.  Severe dilated cardiomyopathy, more likely related to alcohol abuse.  Mildly elevated troponin without angina or acute ischemic ST-T changes, more like related to CHF exacerbation.  History of traumatic brain injury 7 years ago.  Alcohol abuse.   Active  smoking.     Plan:   Net -2.7 L yesterday. Continue IV frusemide at the current dose.  Maintain potassium around 4.0 magnesium around 2.0.  Monitor kidney function.  Continue metoprolol, spironolactone and Farxiga.  Will start Entresto 0.5 tablet BID.   Cardiac stress test as an outpatient.  Alcohol and smoking cessation counseling was provided.  Other issues as per primary team.     Thank you for the consultation.  Will continue to follow along with you.  Please call with any questions.    CODE STATUS:   Level Of Support Discussed With: Patient  Code Status (Patient has no pulse and is not breathing): CPR (Attempt to Resuscitate)  Medical Interventions (Patient has pulse or is breathing): Full Support      Electronically signed by Jomar Lynch MD, 08/23/23, 8:40 AM EDT.

## 2023-08-23 NOTE — THERAPY EVALUATION
Patient Name: Greyson Schofield  : 1970    MRN: [id number]                              Today's Date: 2023       Admit Date: 2023    Visit Dx:     ICD-10-CM ICD-9-CM   1. Acute on chronic congestive heart failure, unspecified heart failure type  I50.9 428.0   2. Difficulty walking  R26.2 719.7   3. Impaired mobility and ADLs  Z74.09 V49.89    Z78.9      Patient Active Problem List   Diagnosis    Acute exacerbation of CHF (congestive heart failure)     Past Medical History:   Diagnosis Date    Arthritis      Past Surgical History:   Procedure Laterality Date    APPENDECTOMY        General Information       Row Name 23 1252          OT Time and Intention    Document Type evaluation  -AC     Mode of Treatment individual therapy;occupational therapy  -AC       Row Name 23 1252          General Information    Patient Profile Reviewed yes  -AC     Prior Level of Function independent:;all household mobility;community mobility;ADL's  no device, no dme  -AC     Existing Precautions/Restrictions no known precautions/restrictions  -AC     Barriers to Rehab none identified  -AC       Row Name 23 1252          Occupational Profile    Reason for Services/Referral (Occupational Profile) Pt. is a 53year old male admitted for the above diagnosis. Pt. referred to OT services to assess independence with ADLs and adl transfers/fx'l mobility. No previous OT services for current condition.  -AC       Row Name 23 1252          Living Environment    People in Home other (see comments)  cousin  -AC       Row Name 23 1252          Cognition    Orientation Status (Cognition) oriented x 4  -AC       Row Name 23 1252          Safety Issues, Functional Mobility    Comment, Safety Issues/Impairments (Mobility) none identified  -AC               User Key  (r) = Recorded By, (t) = Taken By, (c) = Cosigned By      Initials Name Provider Type    Riana Price OT Occupational Therapist                      Mobility/ADL's       Row Name 08/23/23 1254          Bed Mobility    Bed Mobility supine-sit-supine  -     Supine-Sit-Supine Ward (Bed Mobility) independent  -       Row Name 08/23/23 1254          Transfers    Transfers sit-stand transfer  -       Row Name 08/23/23 1254          Sit-Stand Transfer    Sit-Stand Ward (Transfers) independent  -       Row Name 08/23/23 1254          Functional Mobility    Functional Mobility- Ind. Level independent  -     Functional Mobility- Comment patient was (I) with functional mobility from EOB to/from room door without loss of balance. patient did state upon standing that his feet were burning but it would get better if he walked more.  -       Row Name 08/23/23 1254          Activities of Daily Living    BADL Assessment/Intervention --  patient is (I) with self-feeding, (I) with bathing/dressing, (I) with grooming, (I) with toileting  -               User Key  (r) = Recorded By, (t) = Taken By, (c) = Cosigned By      Initials Name Provider Type     Riana Bright OT Occupational Therapist                   Obj/Interventions       Row Name 08/23/23 1256          Sensory Assessment (Somatosensory)    Sensory Assessment (Somatosensory) sensation intact  -[facility] Name 08/23/23 1256          Vision Assessment/Intervention    Visual Impairment/Limitations WFL  -[facility] Name 08/23/23 1256          Range of Motion Comprehensive    General Range of Motion bilateral upper extremity ROM WNL  -[facility] Name 08/23/23 1256          Strength Comprehensive (MMT)    General Manual Muscle Testing (MMT) Assessment no strength deficits identified  -       Row Name 08/23/23 1256          Motor Skills    Motor Skills coordination;functional endurance  -     Coordination WFL  -     Functional Endurance fair plus/good  -[facility] Name 08/23/23 1256          Balance    Balance Assessment standing dynamic balance  -     Dynamic Standing  Balance independent  -AC     Position/Device Used, Standing Balance unsupported  -AC               User Key  (r) = Recorded By, (t) = Taken By, (c) = Cosigned By      Initials Name Provider Type    Riana Price OT Occupational Therapist                   Goals/Plan    No documentation.                  Clinical Impression       Row Name 08/23/23 1259          Pain Assessment    Pretreatment Pain Rating 1/10  -AC     Posttreatment Pain Rating 1/10  -AC     Pain Location - Side/Orientation Bilateral  -AC     Pain Location lower  -AC     Pain Location - foot  -AC       Row Name 08/23/23 1259          Plan of Care Review    Plan of Care Reviewed With patient  -AC     Progress no change  -AC     Outcome Evaluation Patient not appropriate for skilled OT services at this time as patient has not had a decline in ADLs or ADL transfers/fx'l mobility. patient safe to discharge to previous setting when medically appropriate. Discharge occupational therapy services.  -       Row Name 08/23/23 125          Therapy Assessment/Plan (OT)    Patient/Family Therapy Goal Statement (OT) NA  -AC     Rehab Potential (OT) --  NA  -AC     Criteria for Skilled Therapeutic Interventions Met (OT) no;no problems identified which require skilled intervention  -AC     Therapy Frequency (OT) evaluation only  -AC       Row Name 08/23/23 1252          Therapy Plan Review/Discharge Plan (OT)    Anticipated Discharge Disposition (OT) home  -       Row Name 08/23/23 1258          Positioning and Restraints    Pre-Treatment Position in bed  -AC     Post Treatment Position bed  -AC     In Bed fowlers;call light within reach;encouraged to call for assist;exit alarm on  -AC               User Key  (r) = Recorded By, (t) = Taken By, (c) = Cosigned By      Initials Name Provider Type    Riana Price OT Occupational Therapist                   Outcome Measures       Row Name 08/23/23 1309          How much help from another is currently  needed...    Putting on and taking off regular lower body clothing? 4  -AC     Bathing (including washing, rinsing, and drying) 4  -AC     Toileting (which includes using toilet bed pan or urinal) 4  -AC     Putting on and taking off regular upper body clothing 4  -AC     Taking care of personal grooming (such as brushing teeth) 4  -AC     Eating meals 4  -AC     AM-PAC 6 Clicks Score (OT) 24  -AC       Row Name 08/23/23 1023 08/23/23 0900       How much help from another person do you currently need...    Turning from your back to your side while in flat bed without using bedrails? 4  -AH 4  -DP    Moving from lying on back to sitting on the side of a flat bed without bedrails? 4  -AH 4  -DP    Moving to and from a bed to a chair (including a wheelchair)? 4  -AH 4  -DP    Standing up from a chair using your arms (e.g., wheelchair, bedside chair)? 4  -AH 4  -DP    Climbing 3-5 steps with a railing? 3  -AH 4  -DP    To walk in hospital room? 4  -AH 4  -DP    AM-PAC 6 Clicks Score (PT) 23  - 24  -DP    Highest level of mobility 7 --> Walked 25 feet or more  -AH 8 --> Walked 250 feet or more  -DP      Row Name 08/23/23 0820          How much help from another person do you currently need...    Turning from your back to your side while in flat bed without using bedrails? 4  -AH     Moving from lying on back to sitting on the side of a flat bed without bedrails? 4  -AH     Moving to and from a bed to a chair (including a wheelchair)? 4  -AH     Standing up from a chair using your arms (e.g., wheelchair, bedside chair)? 4  -AH     Climbing 3-5 steps with a railing? 3  -AH     To walk in hospital room? 4  -     AM-PAC 6 Clicks Score (PT) 23  -AH     Highest level of mobility 7 --> Walked 25 feet or more  -       Row Name 08/23/23 1303 08/23/23 0900       Functional Assessment    Outcome Measure Options AM-PAC 6 Clicks Daily Activity (OT);Optimal Instrument  -AC AM-PAC 6 Clicks Basic Mobility (PT)  -DP      Row Name  08/23/23 1303          Optimal Instrument    Optimal Instrument Optimal - 3  -AC     Bending/Stooping 1  -AC     Standing 1  -AC     Reaching 1  -AC     From the list, choose the 3 activities you would most like to be able to do without any difficulty Bending/stooping;Standing;Reaching  -AC     Total Score Optimal - 3 3  -AC               User Key  (r) = Recorded By, (t) = Taken By, (c) = Cosigned By      Initials Name Provider Type    AC Riana Bright, OT Occupational Therapist    Emma Rogers, PT Physical Therapist    Miriam Mcdowell, RN Registered Nurse                    Occupational Therapy Education       Title: PT OT SLP Therapies (Done)       Topic: Occupational Therapy (Done)       Point: ADL training (Done)       Description:   Instruct learner(s) on proper safety adaptation and remediation techniques during self care or transfers.   Instruct in proper use of assistive devices.                  Learning Progress Summary             Patient Acceptance, E, VU by  at 8/23/2023 1304                         Point: Home exercise program (Done)       Description:   Instruct learner(s) on appropriate technique for monitoring, assisting and/or progressing therapeutic exercises/activities.                  Learning Progress Summary             Patient Acceptance, E, VU by  at 8/23/2023 1304                         Point: Precautions (Done)       Description:   Instruct learner(s) on prescribed precautions during self-care and functional transfers.                  Learning Progress Summary             Patient Acceptance, E, VU by  at 8/23/2023 1304                         Point: Body mechanics (Done)       Description:   Instruct learner(s) on proper positioning and spine alignment during self-care, functional mobility activities and/or exercises.                  Learning Progress Summary             Patient Acceptance, E, VU by  at 8/23/2023 1304                                         User Key        Initials Effective Dates Name Provider Type Discipline     06/16/21 -  Riana Bright OT Occupational Therapist OT                  OT Recommendation and Plan  Therapy Frequency (OT): evaluation only  Plan of Care Review  Plan of Care Reviewed With: patient  Progress: no change  Outcome Evaluation: Patient not appropriate for skilled OT services at this time as patient has not had a decline in ADLs or ADL transfers/fx'l mobility. patient safe to discharge to previous setting when medically appropriate. Discharge occupational therapy services.     Time Calculation:   Evaluation Complexity (OT)  Review Occupational Profile/Medical/Therapy History Complexity: brief/low complexity  Assessment, Occupational Performance/Identification of Deficit Complexity: 1-3 performance deficits  Clinical Decision Making Complexity (OT): problem focused assessment/low complexity  Overall Complexity of Evaluation (OT): low complexity     Time Calculation- OT       Row Name 08/23/23 1304             Time Calculation- OT    OT Received On 08/23/23  -AC         Untimed Charges    OT Eval/Re-eval Minutes 26  -AC         Total Minutes    Untimed Charges Total Minutes 26  -AC       Total Minutes 26  -AC                User Key  (r) = Recorded By, (t) = Taken By, (c) = Cosigned By      Initials Name Provider Type    AC Riana Bright OT Occupational Therapist                  Therapy Charges for Today       Code Description Service Date Service Provider Modifiers Qty    14385910729 HC OT EVAL LOW COMPLEXITY 2 8/23/2023 Riana Bright OT GO 1                 Riana Bright OT  8/23/2023

## 2023-08-23 NOTE — PROGRESS NOTES
[facility]   Hospitalist Progress Note  Date: 2023  Patient Name: Greyson Schofield  : 1970  MRN: [id number]  Date of admission: 2023      Subjective   Subjective     Chief complaint: Shortness of breath, lower extremity edema    Summary:  53-year-old male with history of TBI, hospitalized on 2023 with shortness of breath and lower extremity edema, found to have elevated ALT, likely NSTEMI, type II MI from cardiac strain from CHF exacerbation, anasarca, with heart failure newly diagnosed with a EF 10 to 15%, severely dilated cardiomyopathy likely related to alcohol use, cardiology consulted    Interval follow-up: Seen and examined, no acute distress, no acute major overnight events, diuresing well, new diagnosis CHF, echo with starkly reduced ejection fraction 10.3% with also diastolic dysfunction and dilated cardiomyopathy.  At a young age of 53, he has very critically poor heart function.  Cardiology recommendations and adjustments to medications appreciated.  B12 447, B12 replacement ordered, while on Lasix is net -2.7 L, 2.9 L of urine output, creatinine 1.18, potassium 3.8, sodium 131, hemoglobin 11.3.  Duplex scan of the lower extremities was negative for DVT.  Telemetry reviewed, no acute major arrhythmic events.  Sinus rhythm.    Review of systems:  All systems reviewed and negative except generalized fatigue, generalized weakness, lower extremity edema, abdominal distention    Objective   Objective     Vitals:   Temp:  [97.3 °F (36.3 °C)-98.4 °F (36.9 °C)] 97.7 °F (36.5 °C)  Heart Rate:  [] 83  Resp:  [18-20] 20  BP: (100-137)/(52-81) 137/72  Physical Exam     Constitutional: Awake alert oriented no acute distress  Respiratory: Diminished, crackles at the bases  GI: Abdomen distended and taut  Cardiovascular: RRR, no murmur  Extremities: Lower extremity edema noted #anasarca with swelling to his thighs  Neuro: Alert and oriented x3, slow to respond with slow speech, cranial  nerves II through XII gross intact confrontation      Result Review    Result Review:  I have personally reviewed the pertinent results from the past 24 hours to 8/23/2023 19:01 EDT and agree with these findings:  [x]  Laboratory   CBC          8/22/2023    01:51 8/22/2023    11:34 8/23/2023    04:54   CBC   WBC 6.26  4.43  6.60    RBC 3.06  2.99  3.19    Hemoglobin 10.8  10.8  11.3    Hematocrit 31.2  30.0  32.5    .0  100.3  101.9    MCH 35.3  36.1  35.4    MCHC 34.6  36.0  34.8    RDW 15.0  15.0  15.1    Platelets 318  290  319      BMP          8/22/2023    01:51 8/22/2023    11:34 8/23/2023    04:54   BMP   BUN 11  9  10    Creatinine 1.15  1.07  1.18    Sodium 132  131  131    Potassium 4.0  3.3  3.8    Chloride 96  94  91    CO2 24.5  25.1  26.6    Calcium 8.9  8.3  8.4      LIVER FUNCTION TESTS:      Lab 08/23/23  0454 08/22/23  1134 08/22/23  0151   TOTAL PROTEIN 7.2 7.2 7.4   ALBUMIN 4.1 4.2 4.4   GLOBULIN  --  3.0 3.0   ALT (SGPT) 16 17 18   AST (SGOT) 67* 73* 78*   BILIRUBIN 0.8 0.6 0.6   INDIRECT BILIRUBIN 0.5  --   --    BILIRUBIN DIRECT 0.3  --   --    ALK PHOS 109 105 111       [x]  Microbiology   Blood Culture   Date Value Ref Range Status   08/22/2023 No growth at 24 hours  Preliminary     No results found for: BCIDPCR, CXREFLEX, CSFCX, CULTURETIS  No results found for: CULTURES, HSVCX, URCX  No results found for: EYECULTURE, GCCX, HSVCULTURE, LABHSV  No results found for: LEGIONELLA, MRSACX, MUMPSCX, MYCOPLASCX  No results found for: NOCARDIACX, STOOLCX  No results found for: THROATCX, UNSTIMCULT, URINECX, CULTURE, VZVCULTUR  No results found for: VIRALCULTU, WOUNDCX    [x]  Radiology Adult Transthoracic Echo Complete W/ Cont if Necessary Per Protocol    Result Date: 8/22/2023  •  The left ventricular cavity is severely dilated. •  Left ventricular systolic function is severely decreased. Calculated left ventricular EF = 10.3% •  Left ventricular diastolic function is consistent with (grade  II w/high LAP) pseudonormalization. •  Mildly reduced right ventricular systolic function noted. •  The left atrial cavity is severely dilated. •  Estimated right ventricular systolic pressure from tricuspid regurgitation is normal (<35 mmHg). •  There is a small (<1cm) pericardial effusion posteriorly.     US Abdomen Complete    Result Date: 8/22/2023  PROCEDURE: US ABDOMEN COMPLETE  COMPARISON: None  INDICATIONS: Rule out ascites  TECHNIQUE: High resolution sonographic examination of the abdomen was performed.   FINDINGS:  There is underlying ascites within all 4 quadrants of the abdomen.        1. Evidence for ascites.     DARCI RODRIGUEZ MD       Electronically Signed and Approved By: DARCI RODRIGUEZ MD on 8/22/2023 at 20:32             XR Chest 1 View    Result Date: 8/22/2023  PROCEDURE: XR CHEST 1 VW  COMPARISON: None.  INDICATIONS: shortness of breath  FINDINGS:  A single AP upright portable chest radiograph reveals mild-to-moderate cardiomegaly, mild pulmonary edema with vascular congestion, minimal, if any, pleural effusion, no pneumothorax, and an old healed distal right clavicle fracture.  Mild thoracic aortic atherosclerotic change is possible.  There may be chronic calcified granulomatous disease of the chest.         1. There is mild-to-moderate cardiomegaly.   2. Mild pulmonary edema with vascular congestion is suggested.  Mild subsegmental atelectasis may involve the lung bases.  Pneumonia cannot be excluded entirely but is thought to be less likely.  3. Please see above comments for further detail.      Please note that portions of this note were completed with a voice recognition program.  CORINA OLVERA JR, MD       Electronically Signed and Approved By: CORINA OLVERA JR, MD on 8/22/2023 at 3:05              Duplex Venous Lower Extremity - Right CV-READ    Result Date: 8/23/2023  •  Normal right lower extremity venous duplex scan.       [x]  EKG/Telemetry   []  Cardiology/Vascular   []  Pathology  [x]   Old records  []  Other:    Assessment & Plan   Assessment / Plan     Assessment/Plan:    Assessment:  Acute decompensation of combined heart failure; new diagnosis  Anasarca  Elevated troponin likely NSTEMI type II from cardiac strain from CHF exacerbation  Abdominal distention  Abdominal ascites likely secondary to heart failure and probably liver disease  Chronic alcohol use  Macrocytosis  Anemia  B12 deficiency     Plan:  Labs and imaging reviewed  CT abdomen and pelvis to evaluate abdominal ascites and liver with ascites seen on ultrasound  Continue with diuresis IV Lasix 40 twice daily  Cardiology recommendations appreciated  Continue Lasix 40 mg IV twice daily  Strict I's and O's  Daily weights  Started on Jardiance 10 mg daily  Started on Aldactone 25 mg daily  Started on Entresto 24-26 mg at half tablet daily  Started on metoprolol XL 25 mg daily  Continue  low-sodium diet  1000 mcg of B12 replacement daily IM  Rechecking chemistries to trend serum creatinine and electrolytes in a.m. patient undergoes aggressive diuresis  Follow CBC daily  CIWA protocol for possible withdrawal symptoms given patient's daily alcohol use  Further treatment contingent upon his hospital course  DVT prophylaxis with SCD  Discussed plan with RN.    DVT prophylaxis:  Medical DVT prophylaxis orders are present.    CODE STATUS:   Level Of Support Discussed With: Patient  Code Status (Patient has no pulse and is not breathing): CPR (Attempt to Resuscitate)  Medical Interventions (Patient has pulse or is breathing): Full Support        Electronically signed by Orlando Corey MD, 08/23/23, 7:01 PM EDT.    Portions of this documentation were transcribed electronically from a voice recognition software.  I confirm all data accurately represents the service(s) I performed at today's visit.

## 2023-08-23 NOTE — THERAPY EVALUATION
Acute Care - Physical Therapy Initial Evaluation   Kesha     Patient Name: Greyson Schofield  : 1970  MRN: [id number]  Today's Date: 2023      Visit Dx:     ICD-10-CM ICD-9-CM   1. Acute on chronic congestive heart failure, unspecified heart failure type  I50.9 428.0   2. Difficulty walking  R26.2 719.7     Patient Active Problem List   Diagnosis    Acute exacerbation of CHF (congestive heart failure)     Past Medical History:   Diagnosis Date    Arthritis      Past Surgical History:   Procedure Laterality Date    APPENDECTOMY       PT Assessment (last 12 hours)       PT Evaluation and Treatment       Row Name 23          Physical Therapy Time and Intention    Subjective Information no complaints  -DP     Document Type evaluation  -DP     Mode of Treatment individual therapy;physical therapy  -DP     Patient Effort good  -DP       Row Name 23          General Information    Patient Profile Reviewed yes  -DP     Patient Observations alert;cooperative;agree to therapy  -DP     Prior Level of Function independent:;transfer;gait;bed mobility;ADL's  -DP     Equipment Currently Used at Home none  -DP     Existing Precautions/Restrictions no known precautions/restrictions  -DP     Barriers to Rehab none identified  -DP       Row Name 23          Living Environment    Current Living Arrangements home  -DP     People in Home other relative(s)  -DP     Primary Care Provided by self  -DP       Row Name 23          Cognition    Orientation Status (Cognition) oriented x 4  -DP       Row Name 23          Range of Motion (ROM)    Range of Motion bilateral lower extremities;ROM is WFL  -DP       Row Name 23          Strength (Manual Muscle Testing)    Strength (Manual Muscle Testing) bilateral lower extremities;strength is WFL  -DP       Row Name 23 09          Bed Mobility    Bed Mobility supine-sit-supine  -DP     Supine-Sit-Supine [place]  (Bed Mobility) independent  -DP       Row Name 08/23/23 0900          Transfers    Transfers sit-stand transfer  -DP       Row Name 08/23/23 0900          Sit-Stand Transfer    Sit-Stand [place] (Transfers) independent  -DP       Row Name 08/23/23 0900          Gait/Stairs (Locomotion)    Gait/Stairs Locomotion gait/ambulation independence  -DP     [place] Level (Gait) independent  -DP     Assistive Device (Gait) other (see comments)  none  -DP     Distance in Feet (Gait) 40  -DP     Comment, (Gait/Stairs) Pt is able to ambulate ad maryellen in his room. complains of BLE hurting while ambulation  -DP       Row Name 08/23/23 0900          Balance    Balance Assessment standing dynamic balance  -DP     Dynamic Standing Balance independent  -DP       Row Name 08/23/23 0900          Plan of Care Review    Plan of Care Reviewed With patient  -DP     Outcome Evaluation Pt is able to complete all transfers independently and able to ambulate ad maryellen in his room. He does not need inpatient PT services.  -DP       Row Name 08/23/23 0900          Therapy Assessment/Plan (PT)    Criteria for Skilled Interventions Met (PT) no problems identified which require skilled intervention  -DP     Therapy Frequency (PT) evaluation only  -DP       Row Name 08/23/23 0900          PT Evaluation Complexity    History, PT Evaluation Complexity no personal factors and/or comorbidities  -DP     Examination of Body Systems (PT Eval Complexity) total of 4 or more elements  -DP     Clinical Presentation (PT Evaluation Complexity) stable  -DP     Clinical Decision Making (PT Evaluation Complexity) low complexity  -DP     Overall Complexity (PT Evaluation Complexity) low complexity  -DP               User Key  (r) = Recorded By, (t) = Taken By, (c) = Cosigned By      Initials Name Provider Type    Emma Rogers, PT Physical Therapist                  PHYSICAL THERAPY GOALS/ PLAN    LTG 1:  Today:  Complete PT evaluation.  Treatment:   None  Time Frame/Durataion: 1 day  Outcome: Goal met      PT Recommendation and Plan  Anticipated Discharge Disposition (PT): home with assist  Therapy Frequency (PT): evaluation only  Plan of Care Reviewed With: patient  Outcome Evaluation: Pt is able to complete all transfers independently and able to ambulate ad maryellen in his room. He does not need inpatient PT services.   Outcome Measures       Row Name 08/23/23 0900             How much help from another person do you currently need...    Turning from your back to your side while in flat bed without using bedrails? 4  -DP      Moving from lying on back to sitting on the side of a flat bed without bedrails? 4  -DP      Moving to and from a bed to a chair (including a wheelchair)? 4  -DP      Standing up from a chair using your arms (e.g., wheelchair, bedside chair)? 4  -DP      Climbing 3-5 steps with a railing? 4  -DP      To walk in hospital room? 4  -DP      AM-PAC 6 Clicks Score (PT) 24  -DP         Functional Assessment    Outcome Measure Options AM-PAC 6 Clicks Basic Mobility (PT)  -DP                User Key  (r) = Recorded By, (t) = Taken By, (c) = Cosigned By      Initials Name Provider Type    Emma Rogers, PT Physical Therapist                     Time Calculation:    PT Charges       Row Name 08/23/23 0923             Time Calculation    PT Received On 08/23/23  -DP         Untimed Charges    PT Eval/Re-eval Minutes 30  -DP         Total Minutes    Untimed Charges Total Minutes 30  -DP       Total Minutes 30  -DP                User Key  (r) = Recorded By, (t) = Taken By, (c) = Cosigned By      Initials Name Provider Type    Emma Rogers, PT Physical Therapist                      PT G-Codes  Outcome Measure Options: AM-PAC 6 Clicks Basic Mobility (PT)  AM-PAC 6 Clicks Score (PT): 24    Emma Brown, PT  8/23/2023

## 2023-08-23 NOTE — PLAN OF CARE
Goal Outcome Evaluation:  Plan of Care Reviewed With: patient           Outcome Evaluation: Pt is able to complete all transfers independently and able to ambulate ad maryellen in his room. He does not need inpatient PT services.      Anticipated Discharge Disposition (PT): home with assist

## 2023-08-23 NOTE — PLAN OF CARE
Goal Outcome Evaluation:  Plan of Care Reviewed With: patient        Progress: no change  Outcome Evaluation: Patient not appropriate for skilled OT services at this time as patient has not had a decline in ADLs or ADL transfers/fx'l mobility. patient safe to discharge to previous setting when medically appropriate. Discharge occupational therapy services.      Anticipated Discharge Disposition (OT): home

## 2023-08-23 NOTE — PLAN OF CARE
Goal Outcome Evaluation:  Plan of Care Reviewed With: patient   Alert and oriented x 4. NSR. VSS. Patient continues to complain of lower leg pain. Pain medication administered. Edema and redness to BLE. No complaints of chest pain or shortness of air. Will continue plan of care.     Progress: improving

## 2023-08-24 ENCOUNTER — APPOINTMENT (OUTPATIENT)
Dept: CT IMAGING | Facility: HOSPITAL | Age: 53
DRG: 281 | End: 2023-08-24
Payer: COMMERCIAL

## 2023-08-24 LAB
ALBUMIN SERPL-MCNC: 4 G/DL (ref 3.5–5.2)
ALP SERPL-CCNC: 102 U/L (ref 39–117)
ALT SERPL W P-5'-P-CCNC: 14 U/L (ref 1–41)
ANION GAP SERPL CALCULATED.3IONS-SCNC: 12.8 MMOL/L (ref 5–15)
ANISOCYTOSIS BLD QL: NORMAL
AST SERPL-CCNC: 65 U/L (ref 1–40)
BASOPHILS # BLD AUTO: 0.03 10*3/MM3 (ref 0–0.2)
BASOPHILS NFR BLD AUTO: 0.6 % (ref 0–1.5)
BILIRUB CONJ SERPL-MCNC: 0.2 MG/DL (ref 0–0.3)
BILIRUB INDIRECT SERPL-MCNC: 0.4 MG/DL
BILIRUB SERPL-MCNC: 0.6 MG/DL (ref 0–1.2)
BUN SERPL-MCNC: 12 MG/DL (ref 6–20)
BUN/CREAT SERPL: 10.3 (ref 7–25)
CALCIUM SPEC-SCNC: 8.3 MG/DL (ref 8.6–10.5)
CHLORIDE SERPL-SCNC: 89 MMOL/L (ref 98–107)
CO2 SERPL-SCNC: 23.2 MMOL/L (ref 22–29)
CREAT SERPL-MCNC: 1.16 MG/DL (ref 0.76–1.27)
DEPRECATED RDW RBC AUTO: 58.8 FL (ref 37–54)
EGFRCR SERPLBLD CKD-EPI 2021: 76.3 ML/MIN/1.73
EOSINOPHIL # BLD AUTO: 0.07 10*3/MM3 (ref 0–0.4)
EOSINOPHIL NFR BLD AUTO: 1.4 % (ref 0.3–6.2)
ERYTHROCYTE [DISTWIDTH] IN BLOOD BY AUTOMATED COUNT: 15.2 % (ref 12.3–15.4)
GLUCOSE SERPL-MCNC: 83 MG/DL (ref 65–99)
HCT VFR BLD AUTO: 34.5 % (ref 37.5–51)
HGB BLD-MCNC: 11.3 G/DL (ref 13–17.7)
IMM GRANULOCYTES # BLD AUTO: 0.01 10*3/MM3 (ref 0–0.05)
IMM GRANULOCYTES NFR BLD AUTO: 0.2 % (ref 0–0.5)
LYMPHOCYTES # BLD AUTO: 1.06 10*3/MM3 (ref 0.7–3.1)
LYMPHOCYTES NFR BLD AUTO: 20.8 % (ref 19.6–45.3)
MACROCYTES BLD QL SMEAR: NORMAL
MAGNESIUM SERPL-MCNC: 1.9 MG/DL (ref 1.6–2.6)
MCH RBC QN AUTO: 34.5 PG (ref 26.6–33)
MCHC RBC AUTO-ENTMCNC: 32.8 G/DL (ref 31.5–35.7)
MCV RBC AUTO: 105.2 FL (ref 79–97)
MONOCYTES # BLD AUTO: 0.42 10*3/MM3 (ref 0.1–0.9)
MONOCYTES NFR BLD AUTO: 8.2 % (ref 5–12)
NEUTROPHILS NFR BLD AUTO: 3.51 10*3/MM3 (ref 1.7–7)
NEUTROPHILS NFR BLD AUTO: 68.8 % (ref 42.7–76)
NRBC BLD AUTO-RTO: 0 /100 WBC (ref 0–0.2)
PHOSPHATE SERPL-MCNC: 3.6 MG/DL (ref 2.5–4.5)
PLAT MORPH BLD: NORMAL
PLATELET # BLD AUTO: 307 10*3/MM3 (ref 140–450)
PMV BLD AUTO: 9.2 FL (ref 6–12)
POTASSIUM SERPL-SCNC: 3.3 MMOL/L (ref 3.5–5.2)
PROT SERPL-MCNC: 7.4 G/DL (ref 6–8.5)
RBC # BLD AUTO: 3.28 10*6/MM3 (ref 4.14–5.8)
SODIUM SERPL-SCNC: 125 MMOL/L (ref 136–145)
WBC MORPH BLD: NORMAL
WBC NRBC COR # BLD: 5.1 10*3/MM3 (ref 3.4–10.8)

## 2023-08-24 PROCEDURE — 74176 CT ABD & PELVIS W/O CONTRAST: CPT

## 2023-08-24 PROCEDURE — 25010000002 MORPHINE PER 10 MG: Performed by: FAMILY MEDICINE

## 2023-08-24 PROCEDURE — 25010000002 HEPARIN (PORCINE) PER 1000 UNITS: Performed by: STUDENT IN AN ORGANIZED HEALTH CARE EDUCATION/TRAINING PROGRAM

## 2023-08-24 PROCEDURE — 80048 BASIC METABOLIC PNL TOTAL CA: CPT | Performed by: FAMILY MEDICINE

## 2023-08-24 PROCEDURE — 94799 UNLISTED PULMONARY SVC/PX: CPT

## 2023-08-24 PROCEDURE — 25010000002 CYANOCOBALAMIN PER 1000 MCG: Performed by: FAMILY MEDICINE

## 2023-08-24 PROCEDURE — 83735 ASSAY OF MAGNESIUM: CPT | Performed by: FAMILY MEDICINE

## 2023-08-24 PROCEDURE — 99233 SBSQ HOSP IP/OBS HIGH 50: CPT | Performed by: FAMILY MEDICINE

## 2023-08-24 PROCEDURE — 80076 HEPATIC FUNCTION PANEL: CPT | Performed by: FAMILY MEDICINE

## 2023-08-24 PROCEDURE — 84100 ASSAY OF PHOSPHORUS: CPT | Performed by: FAMILY MEDICINE

## 2023-08-24 PROCEDURE — 85007 BL SMEAR W/DIFF WBC COUNT: CPT | Performed by: FAMILY MEDICINE

## 2023-08-24 PROCEDURE — 94761 N-INVAS EAR/PLS OXIMETRY MLT: CPT

## 2023-08-24 PROCEDURE — 25010000002 FUROSEMIDE PER 20 MG: Performed by: PHYSICIAN ASSISTANT

## 2023-08-24 PROCEDURE — 85025 COMPLETE CBC W/AUTO DIFF WBC: CPT | Performed by: FAMILY MEDICINE

## 2023-08-24 PROCEDURE — 99232 SBSQ HOSP IP/OBS MODERATE 35: CPT | Performed by: INTERNAL MEDICINE

## 2023-08-24 RX ORDER — POTASSIUM CHLORIDE 750 MG/1
20 CAPSULE, EXTENDED RELEASE ORAL 2 TIMES DAILY WITH MEALS
Status: COMPLETED | OUTPATIENT
Start: 2023-08-24 | End: 2023-08-24

## 2023-08-24 RX ADMIN — HEPARIN SODIUM 5000 UNITS: 5000 INJECTION INTRAVENOUS; SUBCUTANEOUS at 14:46

## 2023-08-24 RX ADMIN — Medication 1 TABLET: at 08:44

## 2023-08-24 RX ADMIN — FOLIC ACID 1 MG: 1 TABLET ORAL at 10:16

## 2023-08-24 RX ADMIN — CYANOCOBALAMIN 1000 MCG: 1000 INJECTION, SOLUTION INTRAMUSCULAR at 08:45

## 2023-08-24 RX ADMIN — SACUBITRIL AND VALSARTAN 0.5 TABLET: 24; 26 TABLET, FILM COATED ORAL at 08:44

## 2023-08-24 RX ADMIN — WHITE PETROLATUM 1 APPLICATION: 1.75 OINTMENT TOPICAL at 08:44

## 2023-08-24 RX ADMIN — POTASSIUM CHLORIDE 20 MEQ: 10 CAPSULE, COATED, EXTENDED RELEASE ORAL at 08:45

## 2023-08-24 RX ADMIN — METOPROLOL SUCCINATE 25 MG: 25 TABLET, EXTENDED RELEASE ORAL at 08:45

## 2023-08-24 RX ADMIN — Medication 100 MG: at 08:45

## 2023-08-24 RX ADMIN — SACUBITRIL AND VALSARTAN 0.5 TABLET: 24; 26 TABLET, FILM COATED ORAL at 21:28

## 2023-08-24 RX ADMIN — HYDROCODONE BITARTRATE AND ACETAMINOPHEN 1 TABLET: 10; 325 TABLET ORAL at 15:08

## 2023-08-24 RX ADMIN — HYDROCODONE BITARTRATE AND ACETAMINOPHEN 1 TABLET: 10; 325 TABLET ORAL at 21:27

## 2023-08-24 RX ADMIN — HEPARIN SODIUM 5000 UNITS: 5000 INJECTION INTRAVENOUS; SUBCUTANEOUS at 21:27

## 2023-08-24 RX ADMIN — FUROSEMIDE 40 MG: 10 INJECTION, SOLUTION INTRAMUSCULAR; INTRAVENOUS at 17:59

## 2023-08-24 RX ADMIN — HEPARIN SODIUM 5000 UNITS: 5000 INJECTION INTRAVENOUS; SUBCUTANEOUS at 05:48

## 2023-08-24 RX ADMIN — MORPHINE SULFATE 2 MG: 2 INJECTION, SOLUTION INTRAMUSCULAR; INTRAVENOUS at 00:49

## 2023-08-24 RX ADMIN — EMPAGLIFLOZIN 10 MG: 10 TABLET, FILM COATED ORAL at 08:50

## 2023-08-24 RX ADMIN — POTASSIUM CHLORIDE 20 MEQ: 10 CAPSULE, COATED, EXTENDED RELEASE ORAL at 17:59

## 2023-08-24 RX ADMIN — HYDROCODONE BITARTRATE AND ACETAMINOPHEN 1 TABLET: 10; 325 TABLET ORAL at 05:55

## 2023-08-24 RX ADMIN — MORPHINE SULFATE 2 MG: 2 INJECTION, SOLUTION INTRAMUSCULAR; INTRAVENOUS at 17:59

## 2023-08-24 RX ADMIN — FUROSEMIDE 40 MG: 10 INJECTION, SOLUTION INTRAMUSCULAR; INTRAVENOUS at 08:46

## 2023-08-24 RX ADMIN — Medication 10 ML: at 21:27

## 2023-08-24 RX ADMIN — SPIRONOLACTONE 25 MG: 25 TABLET ORAL at 08:45

## 2023-08-24 RX ADMIN — MORPHINE SULFATE 2 MG: 2 INJECTION, SOLUTION INTRAMUSCULAR; INTRAVENOUS at 08:46

## 2023-08-24 RX ADMIN — Medication 10 ML: at 08:47

## 2023-08-24 NOTE — PLAN OF CARE
Goal Outcome Evaluation:  Plan of Care Reviewed With: patient        Progress: improving          Alert and oriented X4.  No overnight concerns or events to report. Vitals stable. Continues with edema to BLE and C/o pain to area. PRN pain meds given and effective upon reassessment

## 2023-08-24 NOTE — PROGRESS NOTES
[facility]     Cardiology Progress Note    Patient Name: Greyson Schofield  : 1970  MRN: [id number]  Primary Care Physician:  Bertha Doe DO  Date of admission: 2023    Subjective   Subjective     No acute events overnight. He is feeling well this morning. He reports improvement of his shortness of breath. He has no chest discomfort or other complaints. He continues to have lower extremity swelling which appears to be improving.     Review of Systems   All systems were reviewed and negative except as above    Objective   Objective     Vitals:   Temp:  [97.3 °F (36.3 °C)-98.1 °F (36.7 °C)] 97.9 °F (36.6 °C)  Heart Rate:  [69-88] 69  Resp:  [20] 20  BP: (102-137)/(70-81) 102/70  Physical Exam                  Constitutional: Awake, alert, No acute distress               Eyes: PERRLA, sclerae anicteric, no conjunctival injection              HENT: NCAT, mucous membranes moist              Neck: Supple, no thyromegaly, no lymphadenopathy, trachea midline              Respiratory: Clear to auscultation bilaterally, nonlabored respirations               Cardiovascular: RRR, no murmurs, rubs, or gallops, palpable pedal pulses bilaterally              Gastrointestinal: Positive bowel sounds, soft, nontender, mildly distended              Musculoskeletal: 2+ bilateral leg edema, no clubbing or cyanosis to extremities              Psychiatric: Appropriate affect, cooperative              Neurologic: Oriented x 3, strength symmetric in all extremities, Cranial Nerves grossly intact to confrontation, speech clear              Skin: No rashes     Scheduled Meds:cyanocobalamin, 1,000 mcg, Intramuscular, Daily  empagliflozin, 10 mg, Oral, Daily  folic acid, 1 mg, Oral, Daily  furosemide, 40 mg, Intravenous, BID  heparin (porcine), 5,000 Units, Subcutaneous, Q8H  metoprolol succinate XL, 25 mg, Oral, Q24H  mineral oil-hydrophilic petrolatum, 1 application , Topical, Daily  multivitamin with minerals, 1 tablet,  Oral, Daily  potassium chloride, 20 mEq, Oral, BID With Meals  sacubitril-valsartan, 0.5 tablet, Oral, Q12H  sodium chloride, 10 mL, Intravenous, Q12H  spironolactone, 25 mg, Oral, Daily  thiamine, 100 mg, Oral, Daily      Continuous Infusions:        Result Review    Result Review:  I have personally reviewed the results from the time of this admission to 8/24/2023 10:53 EDT and agree with these findings:  [x]  Laboratory  []  Microbiology  [x]  Radiology  [x]  EKG/Telemetry   [x]  Cardiology/Vascular   []  Pathology  []  Old records  []  Other:  Most notable findings include:     CBC          8/22/2023    01:51 8/22/2023    11:34 8/23/2023    04:54 8/24/2023    05:48   CBC   WBC 6.26  4.43  6.60  5.10    RBC 3.06  2.99  3.19  3.28    Hemoglobin 10.8  10.8  11.3  11.3    Hematocrit 31.2  30.0  32.5  34.5    .0  100.3  101.9  105.2    MCH 35.3  36.1  35.4  34.5    MCHC 34.6  36.0  34.8  32.8    RDW 15.0  15.0  15.1  15.2    Platelets 318  290  319  307      CMP          8/22/2023    01:51 8/22/2023    11:34 8/23/2023    04:54 8/24/2023    05:48   CMP   Glucose 78  68  80  83    BUN 11  9  10  12    Creatinine 1.15  1.07  1.18  1.16    EGFR 77.1  84.0  74.7  76.3    Sodium 132  131  131  125    Potassium 4.0  3.3  3.8  3.3    Chloride 96  94  91  89    Calcium 8.9  8.3  8.4  8.3    Total Protein 7.4  7.2  7.2  7.4    Albumin 4.4  4.2  4.1  4.0    Globulin 3.0  3.0      Total Bilirubin 0.6  0.6  0.8  0.6    Alkaline Phosphatase 111  105  109  102    AST (SGOT) 78  73  67  65    ALT (SGPT) 18  17  16  14    Albumin/Globulin Ratio 1.5  1.4      BUN/Creatinine Ratio 9.6  8.4  8.5  10.3    Anion Gap 11.5  11.9  13.4  12.8       CARDIAC LABS:      Lab 08/22/23  0438 08/22/23  0151   PROBNP  --  2,246.0*   HSTROP T 125* 151*        Assessment & Plan   Assessment / Plan     Brief Patient Summary:  Greyson Schofield is a 53 y.o. male with:     Anasarca secondary to acute systolic congestive heart failure exacerbation,  newly diagnosed, LVEF around 10 to 15% by echo.  Severe dilated cardiomyopathy, more likely related to alcohol abuse.  Mildly elevated troponin without angina or acute ischemic ST-T changes, more like related to CHF exacerbation.  History of traumatic brain injury 7 years ago.  Alcohol abuse.   Active smoking.     Plan:   Net -2.1 L yesterday. Continue IV frusemide at the current dose.  Maintain potassium around 4.0 magnesium around 2.0.  Monitor kidney function.  Continue Entresto, metoprolol, spironolactone and Farxiga.  Cardiac stress test as an outpatient.  Alcohol and smoking cessation counseling was provided.  Other issues as per primary team.     Thank you for the consultation.  Will continue to follow along with you.  Please call with any questions.    CODE STATUS:   Level Of Support Discussed With: Patient  Code Status (Patient has no pulse and is not breathing): CPR (Attempt to Resuscitate)  Medical Interventions (Patient has pulse or is breathing): Full Support      Electronically signed by Jomar Lynch MD, 08/24/23, 10:53 AM EDT.

## 2023-08-24 NOTE — PROGRESS NOTES
[facility]   Hospitalist Progress Note  Date: 2023  Patient Name: Greyson Schofield  : 1970  MRN: [id number]  Date of admission: 2023      Subjective   Subjective     Chief complaint: Shortness of breath, lower extremity edema    Summary:  53-year-old male with history of TBI, hospitalized on 2023 with shortness of breath and lower extremity edema, found to have elevated ALT, likely NSTEMI, type II MI from cardiac strain from CHF exacerbation, anasarca, with heart failure newly diagnosed with a EF 10 to 15%, severely dilated cardiomyopathy likely related to alcohol use, cardiology consulted    Interval follow-up: Seen and examined, no acute distress, no acute major overnight events, continues to tolerate diuresis but lower extremities remain edematous.  Satting well on room air, blood pressures are soft 100s over 70s.  Urine output 2.1 L over the past 24 hours, creatinine at 1.16, sodium 125, potassium 3.3, hematocrit 34.5.  No signs of alcohol withdrawal.  Having to place him on a fluid restricted diet with a serum sodium dropping.  Telemetry reviewed, sinus rhythm in the 70s with no recorded telemetry events.  CT abdomen and pelvis still pending.    Since review of systems:  All systems reviewed and negative except generalized fatigue, generalized weakness, lower extremity edema, abdominal distention    Objective   Objective     Vitals:   Temp:  [97.3 °F (36.3 °C)-98.1 °F (36.7 °C)] 97.9 °F (36.6 °C)  Heart Rate:  [69-88] 69  Resp:  [20] 20  BP: (102-137)/(70-81) 102/70  Physical Exam     Constitutional: Awake alert oriented no acute distress  Respiratory: Diminished, crackles at the bases  GI: Abdomen distended and taut  Cardiovascular: RRR, no murmur  Extremities: Lower extremity edema noted #anasarca with swelling to his thighs with some blistering and erythema  Neuro: Alert and oriented x3, slow to respond with slow speech, cranial nerves II through XII gross intact  confrontation      Result Review    Result Review:  I have personally reviewed the pertinent results from the past 24 hours to 8/24/2023 10:31 EDT and agree with these findings:  [x]  Laboratory   CBC          8/22/2023    01:51 8/22/2023    11:34 8/23/2023    04:54 8/24/2023    05:48   CBC   WBC 6.26  4.43  6.60  5.10    RBC 3.06  2.99  3.19  3.28    Hemoglobin 10.8  10.8  11.3  11.3    Hematocrit 31.2  30.0  32.5  34.5    .0  100.3  101.9  105.2    MCH 35.3  36.1  35.4  34.5    MCHC 34.6  36.0  34.8  32.8    RDW 15.0  15.0  15.1  15.2    Platelets 318  290  319  307      BMP          8/22/2023    01:51 8/22/2023    11:34 8/23/2023    04:54 8/24/2023    05:48   BMP   BUN 11  9  10  12    Creatinine 1.15  1.07  1.18  1.16    Sodium 132  131  131  125    Potassium 4.0  3.3  3.8  3.3    Chloride 96  94  91  89    CO2 24.5  25.1  26.6  23.2    Calcium 8.9  8.3  8.4  8.3    LIVER FUNCTION TESTS:      Lab 08/24/23  0548 08/23/23  0454 08/22/23  1134 08/22/23  0151   TOTAL PROTEIN 7.4 7.2 7.2 7.4   ALBUMIN 4.0 4.1 4.2 4.4   GLOBULIN  --   --  3.0 3.0   ALT (SGPT) 14 16 17 18   AST (SGOT) 65* 67* 73* 78*   BILIRUBIN 0.6 0.8 0.6 0.6   INDIRECT BILIRUBIN 0.4 0.5  --   --    BILIRUBIN DIRECT 0.2 0.3  --   --    ALK PHOS 102 109 105 111         [x]  Microbiology   Blood Culture   Date Value Ref Range Status   08/22/2023 No growth at 24 hours  Preliminary     No results found for: BCIDPCR, CXREFLEX, CSFCX, CULTURETIS  No results found for: CULTURES, HSVCX, URCX  No results found for: EYECULTURE, GCCX, HSVCULTURE, LABHSV  No results found for: LEGIONELLA, MRSACX, MUMPSCX, MYCOPLASCX  No results found for: NOCARDIACX, STOOLCX  No results found for: THROATCX, UNSTIMCULT, URINECX, CULTURE, VZVCULTUR  No results found for: VIRALCULTU, WOUNDCX    [x]  Radiology Adult Transthoracic Echo Complete W/ Cont if Necessary Per Protocol    Result Date: 8/22/2023    The left ventricular cavity is severely dilated.   Left ventricular  systolic function is severely decreased. Calculated left ventricular EF = 10.3%   Left ventricular diastolic function is consistent with (grade II w/high LAP) pseudonormalization.   Mildly reduced right ventricular systolic function noted.   The left atrial cavity is severely dilated.   Estimated right ventricular systolic pressure from tricuspid regurgitation is normal (<35 mmHg).   There is a small (<1cm) pericardial effusion posteriorly.     US Abdomen Complete    Result Date: 8/22/2023  PROCEDURE: US ABDOMEN COMPLETE  COMPARISON: None  INDICATIONS: Rule out ascites  TECHNIQUE: High resolution sonographic examination of the abdomen was performed.   FINDINGS:  There is underlying ascites within all 4 quadrants of the abdomen.        1. Evidence for ascites.     DARCI RODRIGUEZ MD       Electronically Signed and Approved By: DARCI RODRIGUEZ MD on 8/22/2023 at 20:32             XR Chest 1 View    Result Date: 8/22/2023  PROCEDURE: XR CHEST 1 VW  COMPARISON: None.  INDICATIONS: shortness of breath  FINDINGS:  A single AP upright portable chest radiograph reveals mild-to-moderate cardiomegaly, mild pulmonary edema with vascular congestion, minimal, if any, pleural effusion, no pneumothorax, and an old healed distal right clavicle fracture.  Mild thoracic aortic atherosclerotic change is possible.  There may be chronic calcified granulomatous disease of the chest.         1. There is mild-to-moderate cardiomegaly.   2. Mild pulmonary edema with vascular congestion is suggested.  Mild subsegmental atelectasis may involve the lung bases.  Pneumonia cannot be excluded entirely but is thought to be less likely.  3. Please see above comments for further detail.      Please note that portions of this note were completed with a voice recognition program.  CORINA OLVERA JR, MD       Electronically Signed and Approved By: CORINA OLVERA JR, MD on 8/22/2023 at 3:05              Duplex Venous Lower Extremity - Right CV-READ    Result  Date: 8/23/2023    Normal right lower extremity venous duplex scan.       [x]  EKG/Telemetry   []  Cardiology/Vascular   []  Pathology  [x]  Old records  []  Other:    Assessment & Plan   Assessment / Plan     Assessment/Plan:  Assessment:  Acute decompensation of combined heart failure; new diagnosis  Anasarca  Elevated troponin likely NSTEMI type II from cardiac strain from CHF exacerbation  Abdominal distention  Abdominal ascites likely secondary to heart failure and probably liver disease  Chronic alcohol use  Macrocytosis  Anemia  B12 deficiency     Plan:  Labs and imaging reviewed  Potassium replacement 20 mEq x 2 doses today orally  1500 mL oral fluid restriction  Trending serum sodium  CT abdomen and pelvis to evaluate abdominal ascites and liver with ascites seen on ultrasound  Continue with diuresis IV Lasix 40 twice daily  Cardiology recommendations appreciated  Strict I's and O's  Daily weights  Continue on Jardiance 10 mg daily  Continue on Aldactone 25 mg daily  Continue on Entresto 24-26 mg at half tablet daily  Continue on metoprolol XL 25 mg daily  Continue cardiac diet  PT/OT  1000 mcg of B12 replacement daily IM  Rechecking chemistries to trend serum creatinine and electrolytes in a.m. patient undergoes aggressive diuresis  Follow CBC daily  CIWA protocol for possible withdrawal symptoms given patient's daily alcohol use  Further treatment contingent upon his hospital course  DVT prophylaxis with SCD  Discussed plan with RN.    DVT prophylaxis:  Medical DVT prophylaxis orders are present.    CODE STATUS:   Level Of Support Discussed With: Patient  Code Status (Patient has no pulse and is not breathing): CPR (Attempt to Resuscitate)  Medical Interventions (Patient has pulse or is breathing): Full Support    Electronically signed by Orlando Corey MD, 08/24/23, 10:34 AM EDT..    Portions of this documentation were transcribed electronically from a voice recognition software.  I confirm all data  accurately represents the service(s) I performed at today's visit.

## 2023-08-24 NOTE — NURSING NOTE
Called CT and spoke with Umberto to let him know the patient had started his 2nd half of his contrast medication.

## 2023-08-24 NOTE — PLAN OF CARE
Goal Outcome Evaluation:  Plan of Care Reviewed With: patient, family        Progress: improving  Outcome Evaluation: Pleasant patient rested in bed throughout the day. Patient's aunt at bedside. Skin care performed as ordered. Went to CT for CT scan with contrast. Awaiting results. Complaints of pain in legs bilaterally. Medicated per MAR. No concerns at this time.

## 2023-08-25 ENCOUNTER — APPOINTMENT (OUTPATIENT)
Dept: INTERVENTIONAL RADIOLOGY/VASCULAR | Facility: HOSPITAL | Age: 53
DRG: 281 | End: 2023-08-25
Payer: COMMERCIAL

## 2023-08-25 PROBLEM — R07.89 CHEST TIGHTNESS: Status: ACTIVE | Noted: 2023-08-22

## 2023-08-25 PROBLEM — I50.21 ACUTE HFREF (HEART FAILURE WITH REDUCED EJECTION FRACTION): Status: ACTIVE | Noted: 2023-08-22

## 2023-08-25 LAB
ALBUMIN SERPL-MCNC: 4 G/DL (ref 3.5–5.2)
ALP SERPL-CCNC: 102 U/L (ref 39–117)
ALT SERPL W P-5'-P-CCNC: 16 U/L (ref 1–41)
ANION GAP SERPL CALCULATED.3IONS-SCNC: 14.1 MMOL/L (ref 5–15)
AST SERPL-CCNC: 64 U/L (ref 1–40)
BASE EXCESS BLDA CALC-SCNC: 1.4 MMOL/L (ref -2–2)
BASE EXCESS BLDV CALC-SCNC: 2.6 MMOL/L (ref -2–2)
BASE EXCESS BLDV CALC-SCNC: 3.2 MMOL/L (ref -2–2)
BASOPHILS # BLD AUTO: 0.05 10*3/MM3 (ref 0–0.2)
BASOPHILS NFR BLD AUTO: 0.9 % (ref 0–1.5)
BDY SITE: ABNORMAL
BILIRUB CONJ SERPL-MCNC: 0.2 MG/DL (ref 0–0.3)
BILIRUB INDIRECT SERPL-MCNC: 0.3 MG/DL
BILIRUB SERPL-MCNC: 0.5 MG/DL (ref 0–1.2)
BUN SERPL-MCNC: 10 MG/DL (ref 6–20)
BUN/CREAT SERPL: 8.1 (ref 7–25)
CALCIUM SPEC-SCNC: 8.3 MG/DL (ref 8.6–10.5)
CHLORIDE SERPL-SCNC: 87 MMOL/L (ref 98–107)
CO2 SERPL-SCNC: 23.9 MMOL/L (ref 22–29)
COHGB MFR BLD: 0.3 % (ref 0–1.5)
CREAT SERPL-MCNC: 1.24 MG/DL (ref 0.76–1.27)
DEPRECATED RDW RBC AUTO: 62.3 FL (ref 37–54)
EGFRCR SERPLBLD CKD-EPI 2021: 70.4 ML/MIN/1.73
EOSINOPHIL # BLD AUTO: 0.08 10*3/MM3 (ref 0–0.4)
EOSINOPHIL NFR BLD AUTO: 1.5 % (ref 0.3–6.2)
ERYTHROCYTE [DISTWIDTH] IN BLOOD BY AUTOMATED COUNT: 15.6 % (ref 12.3–15.4)
FHHB: 2.7 % (ref 0–5)
FHHB: 30.4 % (ref 0–5)
FHHB: 32.5 % (ref 0–5)
GAS FLOW AIRWAY: 4 LPM
GLUCOSE SERPL-MCNC: 93 MG/DL (ref 65–99)
HCO3 BLDA-SCNC: 26.1 MMOL/L (ref 22–26)
HCO3 BLDV-SCNC: 29.4 MMOL/L (ref 22–26)
HCO3 BLDV-SCNC: 30.1 MMOL/L (ref 22–26)
HCT VFR BLD AUTO: 38.3 % (ref 37.5–51)
HGB BLD-MCNC: 12.7 G/DL (ref 13–17.7)
HGB BLDA-MCNC: 10.6 G/DL (ref 13.8–16.4)
HGB BLDA-MCNC: 11.2 G/DL (ref 13.8–16.4)
HGB BLDA-MCNC: 11.2 G/DL (ref 13.8–16.4)
IMM GRANULOCYTES # BLD AUTO: 0.02 10*3/MM3 (ref 0–0.05)
IMM GRANULOCYTES NFR BLD AUTO: 0.4 % (ref 0–0.5)
INHALED O2 CONCENTRATION: 36 %
INR PPP: 1.02 (ref 0.86–1.15)
LACTATE BLDA-SCNC: ABNORMAL MMOL/L
LYMPHOCYTES # BLD AUTO: 1.22 10*3/MM3 (ref 0.7–3.1)
LYMPHOCYTES NFR BLD AUTO: 23.1 % (ref 19.6–45.3)
MAGNESIUM SERPL-MCNC: 1.9 MG/DL (ref 1.6–2.6)
MCH RBC QN AUTO: 36.2 PG (ref 26.6–33)
MCHC RBC AUTO-ENTMCNC: 33.2 G/DL (ref 31.5–35.7)
MCV RBC AUTO: 109.1 FL (ref 79–97)
METHGB BLD QL: 0.1 % (ref 0–1.5)
METHGB BLD QL: 0.1 % (ref 0–1.5)
METHGB BLD QL: 0.2 % (ref 0–1.5)
MODALITY: ABNORMAL
MONOCYTES # BLD AUTO: 0.54 10*3/MM3 (ref 0.1–0.9)
MONOCYTES NFR BLD AUTO: 10.2 % (ref 5–12)
NEUTROPHILS NFR BLD AUTO: 3.37 10*3/MM3 (ref 1.7–7)
NEUTROPHILS NFR BLD AUTO: 63.9 % (ref 42.7–76)
NOTE: ABNORMAL
NOTE: ABNORMAL
NRBC BLD AUTO-RTO: 0 /100 WBC (ref 0–0.2)
OXYHGB MFR BLDV: 67.1 % (ref 94–99)
OXYHGB MFR BLDV: 69.1 % (ref 94–99)
OXYHGB MFR BLDV: 96.9 % (ref 94–99)
PCO2 BLDA: 41.7 MM HG (ref 35–45)
PCO2 BLDV: 56.3 MM HG (ref 41–51)
PCO2 BLDV: 57.4 MM HG (ref 41–51)
PH BLDA: 7.42 PH UNITS (ref 7.35–7.45)
PH BLDV: 7.34 PH UNITS (ref 7.31–7.41)
PH BLDV: 7.34 PH UNITS (ref 7.31–7.41)
PHOSPHATE SERPL-MCNC: 2.7 MG/DL (ref 2.5–4.5)
PLATELET # BLD AUTO: 279 10*3/MM3 (ref 140–450)
PMV BLD AUTO: 8.6 FL (ref 6–12)
PO2 BLD: 309 MM[HG] (ref 0–500)
PO2 BLDA: 111.2 MM HG (ref 80–100)
PO2 BLDV: <40.5 MM HG (ref 35–42)
PO2 BLDV: <40.5 MM HG (ref 35–42)
POTASSIUM SERPL-SCNC: 3.7 MMOL/L (ref 3.5–5.2)
PROT SERPL-MCNC: 7.4 G/DL (ref 6–8.5)
PROTHROMBIN TIME: 13.5 SECONDS (ref 11.8–14.9)
RBC # BLD AUTO: 3.51 10*6/MM3 (ref 4.14–5.8)
SAO2 % BLDCOA: 97.3 % (ref 95–99)
SAO2 % BLDCOV: 67.4 % (ref 45–75)
SAO2 % BLDCOV: 69.4 % (ref 45–75)
SODIUM SERPL-SCNC: 125 MMOL/L (ref 136–145)
WBC NRBC COR # BLD: 5.28 10*3/MM3 (ref 3.4–10.8)

## 2023-08-25 PROCEDURE — 82375 ASSAY CARBOXYHB QUANT: CPT | Performed by: INTERNAL MEDICINE

## 2023-08-25 PROCEDURE — C1894 INTRO/SHEATH, NON-LASER: HCPCS | Performed by: INTERNAL MEDICINE

## 2023-08-25 PROCEDURE — 82805 BLOOD GASES W/O2 SATURATION: CPT | Performed by: INTERNAL MEDICINE

## 2023-08-25 PROCEDURE — 84100 ASSAY OF PHOSPHORUS: CPT | Performed by: FAMILY MEDICINE

## 2023-08-25 PROCEDURE — 82820 HEMOGLOBIN-OXYGEN AFFINITY: CPT | Performed by: INTERNAL MEDICINE

## 2023-08-25 PROCEDURE — 99152 MOD SED SAME PHYS/QHP 5/>YRS: CPT | Performed by: INTERNAL MEDICINE

## 2023-08-25 PROCEDURE — 25010000002 FUROSEMIDE PER 20 MG: Performed by: INTERNAL MEDICINE

## 2023-08-25 PROCEDURE — 99232 SBSQ HOSP IP/OBS MODERATE 35: CPT | Performed by: INTERNAL MEDICINE

## 2023-08-25 PROCEDURE — 25010000002 CYANOCOBALAMIN PER 1000 MCG: Performed by: FAMILY MEDICINE

## 2023-08-25 PROCEDURE — 83050 HGB METHEMOGLOBIN QUAN: CPT | Performed by: INTERNAL MEDICINE

## 2023-08-25 PROCEDURE — 80076 HEPATIC FUNCTION PANEL: CPT | Performed by: FAMILY MEDICINE

## 2023-08-25 PROCEDURE — 25010000002 HEPARIN (PORCINE) PER 1000 UNITS: Performed by: STUDENT IN AN ORGANIZED HEALTH CARE EDUCATION/TRAINING PROGRAM

## 2023-08-25 PROCEDURE — 85610 PROTHROMBIN TIME: CPT | Performed by: FAMILY MEDICINE

## 2023-08-25 PROCEDURE — 25510000001 IOPAMIDOL PER 1 ML: Performed by: INTERNAL MEDICINE

## 2023-08-25 PROCEDURE — 4A023N8 MEASUREMENT OF CARDIAC SAMPLING AND PRESSURE, BILATERAL, PERCUTANEOUS APPROACH: ICD-10-PCS | Performed by: INTERNAL MEDICINE

## 2023-08-25 PROCEDURE — 94799 UNLISTED PULMONARY SVC/PX: CPT

## 2023-08-25 PROCEDURE — 80048 BASIC METABOLIC PNL TOTAL CA: CPT | Performed by: FAMILY MEDICINE

## 2023-08-25 PROCEDURE — 25010000002 MORPHINE PER 10 MG: Performed by: INTERNAL MEDICINE

## 2023-08-25 PROCEDURE — 93460 R&L HRT ART/VENTRICLE ANGIO: CPT | Performed by: INTERNAL MEDICINE

## 2023-08-25 PROCEDURE — 99153 MOD SED SAME PHYS/QHP EA: CPT | Performed by: INTERNAL MEDICINE

## 2023-08-25 PROCEDURE — 99232 SBSQ HOSP IP/OBS MODERATE 35: CPT | Performed by: FAMILY MEDICINE

## 2023-08-25 PROCEDURE — 25010000002 FENTANYL CITRATE (PF) 50 MCG/ML SOLUTION: Performed by: INTERNAL MEDICINE

## 2023-08-25 PROCEDURE — 83735 ASSAY OF MAGNESIUM: CPT | Performed by: FAMILY MEDICINE

## 2023-08-25 PROCEDURE — 25010000002 MORPHINE PER 10 MG: Performed by: FAMILY MEDICINE

## 2023-08-25 PROCEDURE — 25010000002 MIDAZOLAM PER 1MG: Performed by: INTERNAL MEDICINE

## 2023-08-25 PROCEDURE — 85025 COMPLETE CBC W/AUTO DIFF WBC: CPT | Performed by: FAMILY MEDICINE

## 2023-08-25 PROCEDURE — B2111ZZ FLUOROSCOPY OF MULTIPLE CORONARY ARTERIES USING LOW OSMOLAR CONTRAST: ICD-10-PCS | Performed by: INTERNAL MEDICINE

## 2023-08-25 PROCEDURE — 25010000002 HEPARIN (PORCINE) PER 1000 UNITS: Performed by: INTERNAL MEDICINE

## 2023-08-25 PROCEDURE — 94761 N-INVAS EAR/PLS OXIMETRY MLT: CPT

## 2023-08-25 PROCEDURE — C1769 GUIDE WIRE: HCPCS | Performed by: INTERNAL MEDICINE

## 2023-08-25 RX ORDER — ACETAMINOPHEN 325 MG/1
650 TABLET ORAL EVERY 4 HOURS PRN
Status: DISCONTINUED | OUTPATIENT
Start: 2023-08-25 | End: 2023-08-31 | Stop reason: HOSPADM

## 2023-08-25 RX ORDER — FUROSEMIDE 10 MG/ML
40 INJECTION INTRAMUSCULAR; INTRAVENOUS
Status: DISCONTINUED | OUTPATIENT
Start: 2023-08-25 | End: 2023-08-25

## 2023-08-25 RX ORDER — HEPARIN SODIUM 1000 [USP'U]/ML
INJECTION, SOLUTION INTRAVENOUS; SUBCUTANEOUS
Status: DISCONTINUED | OUTPATIENT
Start: 2023-08-25 | End: 2023-08-25 | Stop reason: HOSPADM

## 2023-08-25 RX ORDER — NITROGLYCERIN 0.4 MG/1
0.4 TABLET SUBLINGUAL
Status: DISCONTINUED | OUTPATIENT
Start: 2023-08-25 | End: 2023-08-31 | Stop reason: HOSPADM

## 2023-08-25 RX ORDER — MIDAZOLAM HYDROCHLORIDE 2 MG/2ML
INJECTION, SOLUTION INTRAMUSCULAR; INTRAVENOUS
Status: DISCONTINUED | OUTPATIENT
Start: 2023-08-25 | End: 2023-08-25 | Stop reason: HOSPADM

## 2023-08-25 RX ORDER — VERAPAMIL HYDROCHLORIDE 2.5 MG/ML
INJECTION, SOLUTION INTRAVENOUS
Status: DISCONTINUED | OUTPATIENT
Start: 2023-08-25 | End: 2023-08-25 | Stop reason: HOSPADM

## 2023-08-25 RX ORDER — FUROSEMIDE 20 MG/1
20 TABLET ORAL DAILY
Status: DISCONTINUED | OUTPATIENT
Start: 2023-08-26 | End: 2023-08-30

## 2023-08-25 RX ORDER — FENTANYL CITRATE 50 UG/ML
INJECTION, SOLUTION INTRAMUSCULAR; INTRAVENOUS
Status: DISCONTINUED | OUTPATIENT
Start: 2023-08-25 | End: 2023-08-25 | Stop reason: HOSPADM

## 2023-08-25 RX ORDER — DOXYCYCLINE 100 MG/1
100 CAPSULE ORAL EVERY 12 HOURS SCHEDULED
Status: DISCONTINUED | OUTPATIENT
Start: 2023-08-25 | End: 2023-08-31 | Stop reason: HOSPADM

## 2023-08-25 RX ORDER — LIDOCAINE HYDROCHLORIDE 20 MG/ML
INJECTION, SOLUTION INFILTRATION; PERINEURAL
Status: DISCONTINUED | OUTPATIENT
Start: 2023-08-25 | End: 2023-08-25 | Stop reason: HOSPADM

## 2023-08-25 RX ORDER — TORSEMIDE 20 MG/1
20 TABLET ORAL DAILY
Status: DISCONTINUED | OUTPATIENT
Start: 2023-08-25 | End: 2023-08-25

## 2023-08-25 RX ADMIN — FOLIC ACID 1 MG: 1 TABLET ORAL at 09:37

## 2023-08-25 RX ADMIN — SPIRONOLACTONE 25 MG: 25 TABLET ORAL at 09:37

## 2023-08-25 RX ADMIN — SACUBITRIL AND VALSARTAN 0.5 TABLET: 24; 26 TABLET, FILM COATED ORAL at 09:37

## 2023-08-25 RX ADMIN — MORPHINE SULFATE 2 MG: 2 INJECTION, SOLUTION INTRAMUSCULAR; INTRAVENOUS at 06:14

## 2023-08-25 RX ADMIN — Medication 10 ML: at 09:38

## 2023-08-25 RX ADMIN — Medication 1 TABLET: at 09:37

## 2023-08-25 RX ADMIN — HEPARIN SODIUM 5000 UNITS: 5000 INJECTION INTRAVENOUS; SUBCUTANEOUS at 06:14

## 2023-08-25 RX ADMIN — HYDROCODONE BITARTRATE AND ACETAMINOPHEN 1 TABLET: 10; 325 TABLET ORAL at 09:36

## 2023-08-25 RX ADMIN — CYANOCOBALAMIN 1000 MCG: 1000 INJECTION, SOLUTION INTRAMUSCULAR at 09:36

## 2023-08-25 RX ADMIN — DOXYCYCLINE 100 MG: 100 CAPSULE ORAL at 10:56

## 2023-08-25 RX ADMIN — EMPAGLIFLOZIN 10 MG: 10 TABLET, FILM COATED ORAL at 09:37

## 2023-08-25 RX ADMIN — MORPHINE SULFATE 2 MG: 2 INJECTION, SOLUTION INTRAMUSCULAR; INTRAVENOUS at 20:14

## 2023-08-25 RX ADMIN — HYDROCODONE BITARTRATE AND ACETAMINOPHEN 1 TABLET: 10; 325 TABLET ORAL at 22:29

## 2023-08-25 RX ADMIN — METOPROLOL SUCCINATE 25 MG: 25 TABLET, EXTENDED RELEASE ORAL at 09:37

## 2023-08-25 RX ADMIN — SACUBITRIL AND VALSARTAN 0.5 TABLET: 24; 26 TABLET, FILM COATED ORAL at 20:13

## 2023-08-25 RX ADMIN — Medication 100 MG: at 09:37

## 2023-08-25 RX ADMIN — WHITE PETROLATUM 1 APPLICATION: 1.75 OINTMENT TOPICAL at 09:38

## 2023-08-25 RX ADMIN — Medication 10 ML: at 20:14

## 2023-08-25 RX ADMIN — HEPARIN SODIUM 5000 UNITS: 5000 INJECTION INTRAVENOUS; SUBCUTANEOUS at 20:15

## 2023-08-25 RX ADMIN — DOXYCYCLINE 100 MG: 100 CAPSULE ORAL at 20:13

## 2023-08-25 RX ADMIN — FUROSEMIDE 40 MG: 10 INJECTION, SOLUTION INTRAMUSCULAR; INTRAVENOUS at 09:35

## 2023-08-25 NOTE — PROGRESS NOTES
[facility]   Hospitalist Progress Note  Date: 2023  Patient Name: Greyson Schofield  : 1970  MRN: [id number]  Date of admission: 2023      Subjective   Subjective     Chief complaint: Shortness of breath, lower extremity edema    Summary:  53-year-old male with history of TBI, hospitalized on 2023 with shortness of breath and lower extremity edema, found to have elevated ALT, likely NSTEMI, type II MI from cardiac strain from CHF exacerbation, anasarca, with heart failure newly diagnosed with a EF 10 to 15%, severely dilated cardiomyopathy likely related to alcohol use, cardiology consulted, status post cardiac cath, normal coronaries.  Paracentesis pending.  Started antibiotics preemptively for skin breakdown behind proximal calfs    Interval follow-up: Seen and examined, no acute distress, no acute major overnight events, continues to tolerate diuresis but lower extremities remain edematous with some blistering and erythema behind each calf, no warmth or tenderness, no spreading cellulitic changes but empirically starting antibiotics.  Satting well on room air, blood pressures are soft but stable.  CT scan of the abdomen pelvis was obtained, looking into getting him a paracentesis, underlying liver cirrhosis.  IR can probably do paracentesis Monday.  No fevers, chills, sweats.  Telemetry reviewed, sinus rhythm no acute major rhythmic events.  Cardiac cath today, seen to have normal coronaries.  Lower extremity edema remains significant. Serum sodium remains low at 125, potassium 3.7, BUN 10, creatinine 1.24, AST 64, white blood cell count 5000, hemoglobin 12.7    Review of systems:  All systems reviewed and negative except generalized fatigue, weakness, lower extremity edema, calf discomfort bilaterally, shortness of breath with exertion    Objective   Objective     Vitals:   Temp:  [97.3 °F (36.3 °C)-98.6 °F (37 °C)] 97.3 °F (36.3 °C)  Heart Rate:  [67-83] 70  Resp:  [19-21] 20  BP:  ()/(59-80) 106/79  Flow (L/min):  [2] 2  Physical Exam     Constitutional: Awake alert oriented no acute distress  Respiratory: Diminished, crackles at the bases  GI: Abdomen distended and taut  Cardiovascular: RRR, no murmur  Extremities: Lower extremity edema noted, anasarca with swelling to his thighs with some blistering and erythema superior aspect of the calfs bilaterally without drainage or warmth  Neuro: Alert and oriented x3, slow to respond with slow speech, cranial nerves II through XII gross intact confrontation        Result Review    Result Review:  I have personally reviewed the pertinent results from the past 24 hours to 8/25/2023 14:53 EDT and agree with these findings:  [x]  Laboratory   CBC          8/23/2023    04:54 8/24/2023    05:48 8/25/2023    04:32   CBC   WBC 6.60  5.10  5.28    RBC 3.19  3.28  3.51    Hemoglobin 11.3  11.3  12.7    Hematocrit 32.5  34.5  38.3    .9  105.2  109.1    MCH 35.4  34.5  36.2    MCHC 34.8  32.8  33.2    RDW 15.1  15.2  15.6    Platelets 319  307  279      BMP          8/23/2023    04:54 8/24/2023    05:48 8/25/2023    04:31   BMP   BUN 10  12  10    Creatinine 1.18  1.16  1.24    Sodium 131  125  125    Potassium 3.8  3.3  3.7    Chloride 91  89  87    CO2 26.6  23.2  23.9    Calcium 8.4  8.3  8.3    LIVER FUNCTION TESTS:      Lab 08/25/23  0431 08/24/23  0548 08/23/23  0454 08/22/23  1134 08/22/23  0151   TOTAL PROTEIN 7.4 7.4 7.2 7.2 7.4   ALBUMIN 4.0 4.0 4.1 4.2 4.4   GLOBULIN  --   --   --  3.0 3.0   ALT (SGPT) 16 14 16 17 18   AST (SGOT) 64* 65* 67* 73* 78*   BILIRUBIN 0.5 0.6 0.8 0.6 0.6   INDIRECT BILIRUBIN 0.3 0.4 0.5  --   --    BILIRUBIN DIRECT 0.2 0.2 0.3  --   --    ALK PHOS 102 102 109 105 111         [x]  Microbiology   Blood Culture   Date Value Ref Range Status   08/22/2023 No growth at 24 hours  Preliminary     No results found for: BCIDPCR, CXREFLEX, CSFCX, CULTURETIS  No results found for: CULTURES, HSVCX, URCX  No results found  for: EYECULTURE, GCCX, HSVCULTURE, LABHSV  No results found for: LEGIONELLA, MRSACX, MUMPSCX, MYCOPLASCX  No results found for: NOCARDIACX, STOOLCX  No results found for: THROATCX, UNSTIMCULT, URINECX, CULTURE, VZVCULTUR  No results found for: VIRALCULTU, WOUNDCX    [x]  Radiology Adult Transthoracic Echo Complete W/ Cont if Necessary Per Protocol    Result Date: 8/22/2023    The left ventricular cavity is severely dilated.   Left ventricular systolic function is severely decreased. Calculated left ventricular EF = 10.3%   Left ventricular diastolic function is consistent with (grade II w/high LAP) pseudonormalization.   Mildly reduced right ventricular systolic function noted.   The left atrial cavity is severely dilated.   Estimated right ventricular systolic pressure from tricuspid regurgitation is normal (<35 mmHg).   There is a small (<1cm) pericardial effusion posteriorly.     US Abdomen Complete    Result Date: 8/22/2023  PROCEDURE: US ABDOMEN COMPLETE  COMPARISON: None  INDICATIONS: Rule out ascites  TECHNIQUE: High resolution sonographic examination of the abdomen was performed.   FINDINGS:  There is underlying ascites within all 4 quadrants of the abdomen.        1. Evidence for ascites.     DARCI RODRIGUEZ MD       Electronically Signed and Approved By: DARCI RODRIGUEZ MD on 8/22/2023 at 20:32             XR Chest 1 View    Result Date: 8/22/2023  PROCEDURE: XR CHEST 1 VW  COMPARISON: None.  INDICATIONS: shortness of breath  FINDINGS:  A single AP upright portable chest radiograph reveals mild-to-moderate cardiomegaly, mild pulmonary edema with vascular congestion, minimal, if any, pleural effusion, no pneumothorax, and an old healed distal right clavicle fracture.  Mild thoracic aortic atherosclerotic change is possible.  There may be chronic calcified granulomatous disease of the chest.         1. There is mild-to-moderate cardiomegaly.   2. Mild pulmonary edema with vascular congestion is suggested.  Mild  subsegmental atelectasis may involve the lung bases.  Pneumonia cannot be excluded entirely but is thought to be less likely.  3. Please see above comments for further detail.      Please note that portions of this note were completed with a voice recognition program.  CORINA OLVERA JR, MD       Electronically Signed and Approved By: CORINA OLVERA JR, MD on 8/22/2023 at 3:05              Duplex Venous Lower Extremity - Right CV-READ    Result Date: 8/23/2023    Normal right lower extremity venous duplex scan.       [x]  EKG/Telemetry   []  Cardiology/Vascular   []  Pathology  [x]  Old records  []  Other:    Assessment & Plan   Assessment / Plan     Assessment/Plan:    Assessment:  Acute decompensation of combined heart failure; new diagnosis  Anasarca  Elevated troponin likely NSTEMI type II from cardiac strain from CHF exacerbation  Abdominal distention  Abdominal ascites likely secondary to heart failure and probably liver disease  Chronic alcohol use  Alcohol liver cirrhosis  Macrocytosis  Anemia  B12 deficiency     Plan:  Labs and imaging reviewed  Empirically starting doxycycline 100 mg twice a day for erythematous changes of his lower extremities  Cardiac cath appreciated  Holding IV diuretics today  Restarting Lasix for 20 mg tomorrow  Contacted interventional radiology, planning to pursue paracentesis on Monday, earliest available time  1500 mL oral fluid restriction continued  Trending serum sodium  Cardiology recommendations appreciated  Strict I's and O's  Daily weights  Continue on Jardiance 10 mg daily  Continue on Aldactone 25 mg daily  Continue on Entresto 24-26 mg at half tablet daily  Continue on metoprolol XL 25 mg daily  Continue cardiac diet  PT/OT  1000 mcg of B12 replacement daily IM  Rechecking chemistries to trend serum creatinine and electrolytes in a.m. patient undergoes aggressive diuresis  Follow CBC daily  CIWA protocol for possible withdrawal symptoms given patient's daily alcohol  use  Further treatment contingent upon his hospital course  DVT prophylaxis with SCD  Discussed plan with RN.    DVT prophylaxis:  Medical DVT prophylaxis orders are present.    CODE STATUS:   Level Of Support Discussed With: Patient  Code Status (Patient has no pulse and is not breathing): CPR (Attempt to Resuscitate)  Medical Interventions (Patient has pulse or is breathing): Full Support    Electronically signed by Orlando Corey MD, 08/25/23, 3:06 PM EDT.    Portions of this documentation were transcribed electronically from a voice recognition software.  I confirm all data accurately represents the service(s) I performed at today's visit.

## 2023-08-25 NOTE — PROGRESS NOTES
[facility]     Cardiology Progress Note    Patient Name: Greyson Schofield  : 1970  MRN: [id number]  Primary Care Physician:  Bertha Doe DO  Date of admission: 2023    Subjective   Subjective     No acute events overnight. He reports improvement of his shortness of breath. He has no chest discomfort but reports chest tightness with deep breathing. He continues to have lower extremity swelling which appears unchanged compared to yesterday     Review of Systems              All systems were reviewed and negative except as above    Objective   Objective     Vitals:   Temp:  [97.7 °F (36.5 °C)-98.6 °F (37 °C)] 97.7 °F (36.5 °C)  Heart Rate:  [69-88] 77  Resp:  [18-21] 21  BP: ()/(59-80) 101/68  Physical Exam                 Constitutional: Awake, alert, No acute distress               Eyes: PERRLA, sclerae anicteric, no conjunctival injection              HENT: NCAT, mucous membranes moist              Neck: Supple, no thyromegaly, no lymphadenopathy, trachea midline              Respiratory: Clear to auscultation bilaterally, nonlabored respirations               Cardiovascular: RRR, no murmurs, rubs, or gallops, palpable pedal pulses bilaterally              Gastrointestinal: Positive bowel sounds, soft, nontender, mildly distended              Musculoskeletal: 1+ bilateral leg edema, no clubbing or cyanosis to extremities              Psychiatric: Appropriate affect, cooperative              Neurologic: Oriented x 3, strength symmetric in all extremities, Cranial Nerves grossly intact to confrontation, speech clear              Skin: No rashes     Scheduled Meds:cyanocobalamin, 1,000 mcg, Intramuscular, Daily  empagliflozin, 10 mg, Oral, Daily  folic acid, 1 mg, Oral, Daily  furosemide, 40 mg, Intravenous, BID  heparin (porcine), 5,000 Units, Subcutaneous, Q8H  metoprolol succinate XL, 25 mg, Oral, Q24H  mineral oil-hydrophilic petrolatum, 1 application , Topical, Daily  multivitamin with  minerals, 1 tablet, Oral, Daily  sacubitril-valsartan, 0.5 tablet, Oral, Q12H  sodium chloride, 10 mL, Intravenous, Q12H  spironolactone, 25 mg, Oral, Daily  thiamine, 100 mg, Oral, Daily      Continuous Infusions:        Result Review    Result Review:  I have personally reviewed the results from the time of this admission to 8/25/2023 07:57 EDT and agree with these findings:  [x]  Laboratory  []  Microbiology  [x]  Radiology  [x]  EKG/Telemetry   [x]  Cardiology/Vascular   []  Pathology  []  Old records  []  Other:  Most notable findings include:     CBC          8/23/2023    04:54 8/24/2023    05:48 8/25/2023    04:32   CBC   WBC 6.60  5.10  5.28    RBC 3.19  3.28  3.51    Hemoglobin 11.3  11.3  12.7    Hematocrit 32.5  34.5  38.3    .9  105.2  109.1    MCH 35.4  34.5  36.2    MCHC 34.8  32.8  33.2    RDW 15.1  15.2  15.6    Platelets 319  307  279      CMP          8/23/2023    04:54 8/24/2023    05:48 8/25/2023    04:31   CMP   Glucose 80  83  93    BUN 10  12  10    Creatinine 1.18  1.16  1.24    EGFR 74.7  76.3  70.4    Sodium 131  125  125    Potassium 3.8  3.3  3.7    Chloride 91  89  87    Calcium 8.4  8.3  8.3    Total Protein 7.2  7.4  7.4    Albumin 4.1  4.0  4.0    Total Bilirubin 0.8  0.6  0.5    Alkaline Phosphatase 109  102  102    AST (SGOT) 67  65  64    ALT (SGPT) 16  14  16    BUN/Creatinine Ratio 8.5  10.3  8.1    Anion Gap 13.4  12.8  14.1       CARDIAC LABS:      Lab 08/22/23  0438 08/22/23  0151   PROBNP  --  2,246.0*   HSTROP T 125* 151*        Assessment & Plan   Assessment / Plan     Brief Patient Summary:  Greyson Schofield is a 53 y.o. male with:     Anasarca secondary to acute systolic congestive heart failure exacerbation, newly diagnosed, LVEF around 10 to 15% by echo.  Severe dilated cardiomyopathy, more likely related to alcohol abuse.  Mildly elevated troponin without angina or acute ischemic ST-T changes, more like related to CHF exacerbation.  Hyponatremia, Na 125  History  of traumatic brain injury 7 years ago.  Alcohol abuse.   Active smoking.  Ascites     Plan:   Patient reports chest tightness.  We will proceed with left heart catheterization to rule out obstructive CAD and to measure LVEDP to guide further diuresis.  Risks versus benefits of the procedure were discussed with the patient and he was agreeable to proceed.  In the meanwhile, he will be continued on IV furosemide 40 mg twice daily.  Trend Na.   Maintain potassium around 4.0 magnesium around 2.0.  Monitor kidney function.  Continue Entresto, metoprolol, spironolactone and Farxiga.  Alcohol and smoking cessation counseling was provided.  Consider paracentesis  For evaluation of possible cellulitis of bilateral lower extremities as per primary team     Thank you for the consultation.  Will continue to follow along with you.  Please call with any questions.    CODE STATUS:   Level Of Support Discussed With: Patient  Code Status (Patient has no pulse and is not breathing): CPR (Attempt to Resuscitate)  Medical Interventions (Patient has pulse or is breathing): Full Support      Electronically signed by Jomar Lynch MD, 08/25/23, 7:57 AM EDT.

## 2023-08-25 NOTE — PROGRESS NOTES
RHC/LHC shows:     Normal coronaries   Low cardiac filling pressures consistent with over diuresis  Low normal CO/CI  No intracardiac shunting   Normal PA pressure     Recommendations:   - Will hold on further diuresis for the rest of the day  - Will start Lasix 20 mg PO daily tomorrow morning   - Continue GDMT for HFrEF   - Consider paracentesis   - Compression stockings for residual CHANELLE (ordered)  - Treatment of possible LE cellulitis as per primary team.     He may be discharged tomorrow morning if he remains stable. He will need follow up with the heart failure clinic within 3-5 days post discharge.     Please call with any questions.

## 2023-08-25 NOTE — H&P (VIEW-ONLY)
[facility]     Cardiology Progress Note    Patient Name: Greyson Schofield  : 1970  MRN: [id number]  Primary Care Physician:  Bertha Doe DO  Date of admission: 2023    Subjective   Subjective     No acute events overnight. He reports improvement of his shortness of breath. He has no chest discomfort but reports chest tightness with deep breathing. He continues to have lower extremity swelling which appears unchanged compared to yesterday     Review of Systems              All systems were reviewed and negative except as above    Objective   Objective     Vitals:   Temp:  [97.7 °F (36.5 °C)-98.6 °F (37 °C)] 97.7 °F (36.5 °C)  Heart Rate:  [69-88] 77  Resp:  [18-21] 21  BP: ()/(59-80) 101/68  Physical Exam                 Constitutional: Awake, alert, No acute distress               Eyes: PERRLA, sclerae anicteric, no conjunctival injection              HENT: NCAT, mucous membranes moist              Neck: Supple, no thyromegaly, no lymphadenopathy, trachea midline              Respiratory: Clear to auscultation bilaterally, nonlabored respirations               Cardiovascular: RRR, no murmurs, rubs, or gallops, palpable pedal pulses bilaterally              Gastrointestinal: Positive bowel sounds, soft, nontender, mildly distended              Musculoskeletal: 1+ bilateral leg edema, no clubbing or cyanosis to extremities              Psychiatric: Appropriate affect, cooperative              Neurologic: Oriented x 3, strength symmetric in all extremities, Cranial Nerves grossly intact to confrontation, speech clear              Skin: No rashes     Scheduled Meds:cyanocobalamin, 1,000 mcg, Intramuscular, Daily  empagliflozin, 10 mg, Oral, Daily  folic acid, 1 mg, Oral, Daily  furosemide, 40 mg, Intravenous, BID  heparin (porcine), 5,000 Units, Subcutaneous, Q8H  metoprolol succinate XL, 25 mg, Oral, Q24H  mineral oil-hydrophilic petrolatum, 1 application , Topical, Daily  multivitamin with  minerals, 1 tablet, Oral, Daily  sacubitril-valsartan, 0.5 tablet, Oral, Q12H  sodium chloride, 10 mL, Intravenous, Q12H  spironolactone, 25 mg, Oral, Daily  thiamine, 100 mg, Oral, Daily      Continuous Infusions:        Result Review    Result Review:  I have personally reviewed the results from the time of this admission to 8/25/2023 07:57 EDT and agree with these findings:  [x]  Laboratory  []  Microbiology  [x]  Radiology  [x]  EKG/Telemetry   [x]  Cardiology/Vascular   []  Pathology  []  Old records  []  Other:  Most notable findings include:     CBC          8/23/2023    04:54 8/24/2023    05:48 8/25/2023    04:32   CBC   WBC 6.60  5.10  5.28    RBC 3.19  3.28  3.51    Hemoglobin 11.3  11.3  12.7    Hematocrit 32.5  34.5  38.3    .9  105.2  109.1    MCH 35.4  34.5  36.2    MCHC 34.8  32.8  33.2    RDW 15.1  15.2  15.6    Platelets 319  307  279      CMP          8/23/2023    04:54 8/24/2023    05:48 8/25/2023    04:31   CMP   Glucose 80  83  93    BUN 10  12  10    Creatinine 1.18  1.16  1.24    EGFR 74.7  76.3  70.4    Sodium 131  125  125    Potassium 3.8  3.3  3.7    Chloride 91  89  87    Calcium 8.4  8.3  8.3    Total Protein 7.2  7.4  7.4    Albumin 4.1  4.0  4.0    Total Bilirubin 0.8  0.6  0.5    Alkaline Phosphatase 109  102  102    AST (SGOT) 67  65  64    ALT (SGPT) 16  14  16    BUN/Creatinine Ratio 8.5  10.3  8.1    Anion Gap 13.4  12.8  14.1       CARDIAC LABS:      Lab 08/22/23  0438 08/22/23  0151   PROBNP  --  2,246.0*   HSTROP T 125* 151*        Assessment & Plan   Assessment / Plan     Brief Patient Summary:  Greyson Schofield is a 53 y.o. male with:     Anasarca secondary to acute systolic congestive heart failure exacerbation, newly diagnosed, LVEF around 10 to 15% by echo.  Severe dilated cardiomyopathy, more likely related to alcohol abuse.  Mildly elevated troponin without angina or acute ischemic ST-T changes, more like related to CHF exacerbation.  Hyponatremia, Na 125  History  of traumatic brain injury 7 years ago.  Alcohol abuse.   Active smoking.  Ascites     Plan:   Patient reports chest tightness.  We will proceed with left heart catheterization to rule out obstructive CAD and to measure LVEDP to guide further diuresis.  Risks versus benefits of the procedure were discussed with the patient and he was agreeable to proceed.  In the meanwhile, he will be continued on IV furosemide 40 mg twice daily.  Trend Na.   Maintain potassium around 4.0 magnesium around 2.0.  Monitor kidney function.  Continue Entresto, metoprolol, spironolactone and Farxiga.  Alcohol and smoking cessation counseling was provided.  Consider paracentesis  For evaluation of possible cellulitis of bilateral lower extremities as per primary team     Thank you for the consultation.  Will continue to follow along with you.  Please call with any questions.    CODE STATUS:   Level Of Support Discussed With: Patient  Code Status (Patient has no pulse and is not breathing): CPR (Attempt to Resuscitate)  Medical Interventions (Patient has pulse or is breathing): Full Support      Electronically signed by Jomar Lynch MD, 08/25/23, 7:57 AM EDT.            fall

## 2023-08-25 NOTE — PLAN OF CARE
Goal Outcome Evaluation:  Plan of Care Reviewed With: patient        Progress: improving     Alert and oriented X4. Slept well throughout the night. Vitals stable. C/o pain to B:E resolved with PRN pain medications. Pain meds effective upon reassessment patient observed sleeping. No s/s of distress noted. No overnight concerns or events to report.

## 2023-08-25 NOTE — CASE MANAGEMENT/SOCIAL WORK
Patient has prior authorization approval for Jardiance thru his insurance at discharge, not Farxiga.

## 2023-08-26 LAB
ALBUMIN SERPL-MCNC: 4.3 G/DL (ref 3.5–5.2)
ALP SERPL-CCNC: 100 U/L (ref 39–117)
ALT SERPL W P-5'-P-CCNC: 14 U/L (ref 1–41)
ANION GAP SERPL CALCULATED.3IONS-SCNC: 10.1 MMOL/L (ref 5–15)
APTT PPP: 39 SECONDS (ref 24.2–34.2)
AST SERPL-CCNC: 57 U/L (ref 1–40)
BASOPHILS # BLD AUTO: 0.05 10*3/MM3 (ref 0–0.2)
BASOPHILS NFR BLD AUTO: 0.8 % (ref 0–1.5)
BILIRUB CONJ SERPL-MCNC: 0.2 MG/DL (ref 0–0.3)
BILIRUB INDIRECT SERPL-MCNC: 0.3 MG/DL
BILIRUB SERPL-MCNC: 0.5 MG/DL (ref 0–1.2)
BUN SERPL-MCNC: 11 MG/DL (ref 6–20)
BUN/CREAT SERPL: 8.7 (ref 7–25)
CALCIUM SPEC-SCNC: 8.6 MG/DL (ref 8.6–10.5)
CHLORIDE SERPL-SCNC: 88 MMOL/L (ref 98–107)
CHOLEST SERPL-MCNC: 162 MG/DL (ref 0–200)
CO2 SERPL-SCNC: 30.9 MMOL/L (ref 22–29)
CREAT SERPL-MCNC: 1.27 MG/DL (ref 0.76–1.27)
DEPRECATED RDW RBC AUTO: 55.9 FL (ref 37–54)
EGFRCR SERPLBLD CKD-EPI 2021: 68.4 ML/MIN/1.73
EOSINOPHIL # BLD AUTO: 0.14 10*3/MM3 (ref 0–0.4)
EOSINOPHIL NFR BLD AUTO: 2.1 % (ref 0.3–6.2)
ERYTHROCYTE [DISTWIDTH] IN BLOOD BY AUTOMATED COUNT: 15 % (ref 12.3–15.4)
GLUCOSE SERPL-MCNC: 147 MG/DL (ref 65–99)
HCT VFR BLD AUTO: 35.1 % (ref 37.5–51)
HDLC SERPL-MCNC: 76 MG/DL (ref 40–60)
HGB BLD-MCNC: 11.8 G/DL (ref 13–17.7)
IMM GRANULOCYTES # BLD AUTO: 0.02 10*3/MM3 (ref 0–0.05)
IMM GRANULOCYTES NFR BLD AUTO: 0.3 % (ref 0–0.5)
LDLC SERPL CALC-MCNC: 73 MG/DL (ref 0–100)
LDLC/HDLC SERPL: 0.96 {RATIO}
LYMPHOCYTES # BLD AUTO: 1.69 10*3/MM3 (ref 0.7–3.1)
LYMPHOCYTES NFR BLD AUTO: 25.5 % (ref 19.6–45.3)
MAGNESIUM SERPL-MCNC: 1.7 MG/DL (ref 1.6–2.6)
MCH RBC QN AUTO: 34.4 PG (ref 26.6–33)
MCHC RBC AUTO-ENTMCNC: 33.6 G/DL (ref 31.5–35.7)
MCV RBC AUTO: 102.3 FL (ref 79–97)
MONOCYTES # BLD AUTO: 0.59 10*3/MM3 (ref 0.1–0.9)
MONOCYTES NFR BLD AUTO: 8.9 % (ref 5–12)
NEUTROPHILS NFR BLD AUTO: 4.15 10*3/MM3 (ref 1.7–7)
NEUTROPHILS NFR BLD AUTO: 62.4 % (ref 42.7–76)
NRBC BLD AUTO-RTO: 0 /100 WBC (ref 0–0.2)
PHOSPHATE SERPL-MCNC: 2.4 MG/DL (ref 2.5–4.5)
PLATELET # BLD AUTO: 381 10*3/MM3 (ref 140–450)
PMV BLD AUTO: 8.9 FL (ref 6–12)
POTASSIUM SERPL-SCNC: 2.9 MMOL/L (ref 3.5–5.2)
PROT SERPL-MCNC: 7.3 G/DL (ref 6–8.5)
RBC # BLD AUTO: 3.43 10*6/MM3 (ref 4.14–5.8)
SODIUM SERPL-SCNC: 129 MMOL/L (ref 136–145)
TRIGL SERPL-MCNC: 64 MG/DL (ref 0–150)
VLDLC SERPL-MCNC: 13 MG/DL (ref 5–40)
WBC NRBC COR # BLD: 6.64 10*3/MM3 (ref 3.4–10.8)

## 2023-08-26 PROCEDURE — 80076 HEPATIC FUNCTION PANEL: CPT | Performed by: INTERNAL MEDICINE

## 2023-08-26 PROCEDURE — 94799 UNLISTED PULMONARY SVC/PX: CPT

## 2023-08-26 PROCEDURE — 85025 COMPLETE CBC W/AUTO DIFF WBC: CPT | Performed by: INTERNAL MEDICINE

## 2023-08-26 PROCEDURE — 85730 THROMBOPLASTIN TIME PARTIAL: CPT | Performed by: FAMILY MEDICINE

## 2023-08-26 PROCEDURE — 80048 BASIC METABOLIC PNL TOTAL CA: CPT | Performed by: INTERNAL MEDICINE

## 2023-08-26 PROCEDURE — 25010000002 HEPARIN (PORCINE) PER 1000 UNITS: Performed by: INTERNAL MEDICINE

## 2023-08-26 PROCEDURE — 99233 SBSQ HOSP IP/OBS HIGH 50: CPT | Performed by: INTERNAL MEDICINE

## 2023-08-26 PROCEDURE — 84100 ASSAY OF PHOSPHORUS: CPT | Performed by: INTERNAL MEDICINE

## 2023-08-26 PROCEDURE — 25010000002 LORAZEPAM PER 2 MG: Performed by: INTERNAL MEDICINE

## 2023-08-26 PROCEDURE — 0 POTASSIUM CHLORIDE 10 MEQ/100ML SOLUTION: Performed by: FAMILY MEDICINE

## 2023-08-26 PROCEDURE — 99232 SBSQ HOSP IP/OBS MODERATE 35: CPT | Performed by: FAMILY MEDICINE

## 2023-08-26 PROCEDURE — 83735 ASSAY OF MAGNESIUM: CPT | Performed by: INTERNAL MEDICINE

## 2023-08-26 PROCEDURE — 25010000002 MORPHINE PER 10 MG: Performed by: INTERNAL MEDICINE

## 2023-08-26 PROCEDURE — 80061 LIPID PANEL: CPT | Performed by: INTERNAL MEDICINE

## 2023-08-26 PROCEDURE — 94761 N-INVAS EAR/PLS OXIMETRY MLT: CPT

## 2023-08-26 RX ORDER — FENTANYL/ROPIVACAINE/NS/PF 2-625MCG/1
15 PLASTIC BAG, INJECTION (ML) EPIDURAL ONCE
Status: COMPLETED | OUTPATIENT
Start: 2023-08-26 | End: 2023-08-26

## 2023-08-26 RX ORDER — POTASSIUM CHLORIDE 7.45 MG/ML
10 INJECTION INTRAVENOUS
Status: COMPLETED | OUTPATIENT
Start: 2023-08-26 | End: 2023-08-26

## 2023-08-26 RX ADMIN — FOLIC ACID 1 MG: 1 TABLET ORAL at 08:14

## 2023-08-26 RX ADMIN — POTASSIUM CHLORIDE 10 MEQ: 7.46 INJECTION, SOLUTION INTRAVENOUS at 13:33

## 2023-08-26 RX ADMIN — POTASSIUM PHOSPHATE, MONOBASIC POTASSIUM PHOSPHATE, DIBASIC 15 MMOL: 224; 236 INJECTION, SOLUTION, CONCENTRATE INTRAVENOUS at 08:35

## 2023-08-26 RX ADMIN — LORAZEPAM 1 MG: 2 INJECTION INTRAMUSCULAR; INTRAVENOUS at 11:16

## 2023-08-26 RX ADMIN — EMPAGLIFLOZIN 10 MG: 10 TABLET, FILM COATED ORAL at 08:14

## 2023-08-26 RX ADMIN — POTASSIUM CHLORIDE 10 MEQ: 7.46 INJECTION, SOLUTION INTRAVENOUS at 15:46

## 2023-08-26 RX ADMIN — SPIRONOLACTONE 25 MG: 25 TABLET ORAL at 08:14

## 2023-08-26 RX ADMIN — Medication 10 ML: at 08:37

## 2023-08-26 RX ADMIN — FUROSEMIDE 20 MG: 20 TABLET ORAL at 08:13

## 2023-08-26 RX ADMIN — MORPHINE SULFATE 2 MG: 2 INJECTION, SOLUTION INTRAMUSCULAR; INTRAVENOUS at 08:35

## 2023-08-26 RX ADMIN — HEPARIN SODIUM 5000 UNITS: 5000 INJECTION INTRAVENOUS; SUBCUTANEOUS at 15:46

## 2023-08-26 RX ADMIN — DOXYCYCLINE 100 MG: 100 CAPSULE ORAL at 08:13

## 2023-08-26 RX ADMIN — POTASSIUM CHLORIDE 10 MEQ: 7.46 INJECTION, SOLUTION INTRAVENOUS at 14:48

## 2023-08-26 RX ADMIN — METOPROLOL SUCCINATE 25 MG: 25 TABLET, EXTENDED RELEASE ORAL at 08:13

## 2023-08-26 RX ADMIN — SACUBITRIL AND VALSARTAN 0.5 TABLET: 24; 26 TABLET, FILM COATED ORAL at 21:23

## 2023-08-26 RX ADMIN — HYDROCODONE BITARTRATE AND ACETAMINOPHEN 1 TABLET: 5; 325 TABLET ORAL at 05:51

## 2023-08-26 RX ADMIN — Medication 100 MG: at 08:13

## 2023-08-26 RX ADMIN — Medication 10 ML: at 21:24

## 2023-08-26 RX ADMIN — HYDROCODONE BITARTRATE AND ACETAMINOPHEN 1 TABLET: 5; 325 TABLET ORAL at 21:23

## 2023-08-26 RX ADMIN — DOXYCYCLINE 100 MG: 100 CAPSULE ORAL at 21:23

## 2023-08-26 RX ADMIN — HEPARIN SODIUM 5000 UNITS: 5000 INJECTION INTRAVENOUS; SUBCUTANEOUS at 21:23

## 2023-08-26 RX ADMIN — POTASSIUM CHLORIDE 10 MEQ: 7.46 INJECTION, SOLUTION INTRAVENOUS at 16:53

## 2023-08-26 RX ADMIN — SACUBITRIL AND VALSARTAN 0.5 TABLET: 24; 26 TABLET, FILM COATED ORAL at 08:13

## 2023-08-26 RX ADMIN — Medication 1 TABLET: at 08:13

## 2023-08-26 RX ADMIN — HEPARIN SODIUM 5000 UNITS: 5000 INJECTION INTRAVENOUS; SUBCUTANEOUS at 05:51

## 2023-08-26 RX ADMIN — MORPHINE SULFATE 2 MG: 2 INJECTION, SOLUTION INTRAMUSCULAR; INTRAVENOUS at 02:40

## 2023-08-26 RX ADMIN — POTASSIUM CHLORIDE 10 MEQ: 7.46 INJECTION, SOLUTION INTRAVENOUS at 12:23

## 2023-08-26 NOTE — PLAN OF CARE
Problem: Adult Inpatient Plan of Care  Goal: Plan of Care Review  Outcome: Ongoing, Progressing  Flowsheets (Taken 8/26/2023 1908)  Outcome Evaluation: Patient on room air. Patient medicated with morphine once for pain. Patient complained of being very anxious, ativan given. Patient sleepy after ativan administration. 5 runs of IV potassium chloride today, 1 bag of potassium phosphate.   Goal Outcome Evaluation:              Outcome Evaluation: Patient on room air. Patient medicated with morphine once for pain. Patient complained of being very anxious, ativan given. Patient sleepy after ativan administration. 5 runs of IV potassium chloride today, 1 bag of potassium phosphate.

## 2023-08-26 NOTE — PROGRESS NOTES
[facility]     Cardiology Progress Note    Patient Name: Greyson Schofield  : 1970  MRN: [id number]  Primary Care Physician:  Bertha Doe DO  Date of admission: 2023    Subjective   Subjective     No acute events overnight.  He is doing well this morning.  His shortness of breath improved.  He continues to have abdominal distention and lower extremity swelling.  No arrhythmias were noted on telemetry.    Review of Systems   All systems were reviewed and negative except as above    Objective   Objective     Vitals:   Temp:  [97.3 °F (36.3 °C)-98.4 °F (36.9 °C)] 97.3 °F (36.3 °C)  Heart Rate:  [67-86] 86  Resp:  [18-21] 18  BP: ()/(60-82) 105/71  Flow (L/min):  [2] 2  Physical Exam                 Constitutional: Awake, alert, No acute distress               Eyes: PERRLA, sclerae anicteric, no conjunctival injection              HENT: NCAT, mucous membranes moist              Neck: Supple, no thyromegaly, no lymphadenopathy, trachea midline              Respiratory: Clear to auscultation bilaterally, nonlabored respirations               Cardiovascular: RRR, no murmurs, rubs, or gallops, palpable pedal pulses bilaterally              Gastrointestinal: Positive bowel sounds, soft, nontender, mildly distended              Musculoskeletal: 1+ bilateral leg edema, no clubbing or cyanosis to extremities              Psychiatric: Appropriate affect, cooperative              Neurologic: Oriented x 3, strength symmetric in all extremities, Cranial Nerves grossly intact to confrontation, speech clear              Skin: No rashes     Scheduled Meds:doxycycline, 100 mg, Oral, Q12H  empagliflozin, 10 mg, Oral, Daily  folic acid, 1 mg, Oral, Daily  furosemide, 20 mg, Oral, Daily  heparin (porcine), 5,000 Units, Subcutaneous, Q8H  metoprolol succinate XL, 25 mg, Oral, Q24H  mineral oil-hydrophilic petrolatum, 1 application , Topical, Daily  multivitamin with minerals, 1 tablet, Oral, Daily  potassium  chloride, 10 mEq, Intravenous, Q1H  potassium phosphate, 15 mmol, Intravenous, Once  sacubitril-valsartan, 0.5 tablet, Oral, Q12H  sodium chloride, 10 mL, Intravenous, Q12H  spironolactone, 25 mg, Oral, Daily  thiamine, 100 mg, Oral, Daily      Continuous Infusions:        Result Review    Result Review:  I have personally reviewed the results from the time of this admission to 8/26/2023 09:49 EDT and agree with these findings:  [x]  Laboratory  []  Microbiology  [x]  Radiology  [x]  EKG/Telemetry   [x]  Cardiology/Vascular   []  Pathology  []  Old records  []  Other:  Most notable findings include:     CBC          8/24/2023    05:48 8/25/2023    04:32 8/26/2023    04:45   CBC   WBC 5.10  5.28  6.64    RBC 3.28  3.51  3.43    Hemoglobin 11.3  12.7  11.8    Hematocrit 34.5  38.3  35.1    .2  109.1  102.3    MCH 34.5  36.2  34.4    MCHC 32.8  33.2  33.6    RDW 15.2  15.6  15.0    Platelets 307  279  381      CMP          8/24/2023    05:48 8/25/2023    04:31 8/26/2023    04:45   CMP   Glucose 83  93  147    BUN 12  10  11    Creatinine 1.16  1.24  1.27    EGFR 76.3  70.4  68.4    Sodium 125  125  129    Potassium 3.3  3.7  2.9    Chloride 89  87  88    Calcium 8.3  8.3  8.6    Total Protein 7.4  7.4  7.3    Albumin 4.0  4.0  4.3    Total Bilirubin 0.6  0.5  0.5    Alkaline Phosphatase 102  102  100    AST (SGOT) 65  64  57    ALT (SGPT) 14  16  14    BUN/Creatinine Ratio 10.3  8.1  8.7    Anion Gap 12.8  14.1  10.1       CARDIAC LABS:      Lab 08/25/23  1052 08/22/23  0438 08/22/23  0151   PROBNP  --   --  2,246.0*   HSTROP T  --  125* 151*   PROTIME 13.5  --   --    INR 1.02  --   --         Assessment & Plan   Assessment / Plan     Brief Patient Summary:  Greyson Schofield is a 53 y.o. male with:     Anasarca secondary to acute systolic congestive heart failure exacerbation, newly diagnosed, LVEF around 10 to 15% by echo.  Severe dilated cardiomyopathy, more likely related to alcohol abuse.  Mildly elevated  troponin related to CHF exacerbation.  Cardiac catheterization showed normal coronaries.  Hyponatremia, improved  History of traumatic brain injury 7 years ago.  Alcohol abuse.   Active smoking.  Ascites  Cellulitis     Plan:   Cardiac catheterization from yesterday showed normal coronaries and low LVEDP.  IV Lasix was switched to p.o. 20 mg daily this morning.  He is hypokalemic.  He is receiving potassium replacement.  Maintain potassium around 4.0 magnesium around 2.0.  Monitor sodium level, it appears to be improving.  Monitor kidney function.  Continue Entresto, metoprolol, spironolactone and Farxiga.  External wearable defibrillator was ordered and waiting to be fitted.  Alcohol and smoking cessation counseling was provided.  Other issues as per primary team.     The patient may be discharged from cardiac perspective after clearance by internal medicine.  He will need follow-up in the heart failure clinic within 3 to 5 days after discharge.      CODE STATUS:   Level Of Support Discussed With: Patient  Code Status (Patient has no pulse and is not breathing): CPR (Attempt to Resuscitate)  Medical Interventions (Patient has pulse or is breathing): Full Support      Electronically signed by Jomar Lynch MD, 08/26/23, 9:49 AM EDT.

## 2023-08-26 NOTE — PROGRESS NOTES
[facility]   Hospitalist Progress Note  Date: 2023  Patient Name: Greyson Schofield  : 1970  MRN: [id number]  Date of admission: 2023      Subjective   Subjective     Chief complaint: Shortness of breath, lower extremity edema    Summary:  53-year-old male with history of TBI, hospitalized on 2023 with shortness of breath and lower extremity edema, found to have elevated ALT, likely NSTEMI, type II MI from cardiac strain from CHF exacerbation, anasarca, with heart failure newly diagnosed with a EF 10 to 15%, severely dilated cardiomyopathy likely related to alcohol use, cardiology consulted, status post cardiac cath, normal coronaries.  Paracentesis pending.  Started antibiotics preemptively for skin breakdown behind proximal calfs    Interval follow-up: Seen and examined, no acute distress, no acute major overnight events, no chest pain or palpitations, still has abdominal distention, awaiting paracentesis with IR.  Continues to have lower extremity edema.  Less shortness of breath with exertion.  Telemetry reviewed, no acute major events, hypotensive today 80s over 50s, relatively asymptomatic, creatinine 1.27, potassium critically low at 2.9, sodium 129, bicarb 30, replacement potassium ordered IV and p.o.  Hemoglobin 11.8, white blood cell count 6000.    Review of systems:  All systems reviewed and negative except generalized fatigue, weakness, lower extremity edema, calf discomfort bilaterally, shortness of breath with exertion    Objective   Objective     Vitals:   Temp:  [97.3 °F (36.3 °C)-98.4 °F (36.9 °C)] 97.6 °F (36.4 °C)  Heart Rate:  [70-86] 80  Resp:  [18] 18  BP: ()/(51-82) 82/51  Physical Exam     Constitutional: Awake alert oriented no acute distress  Respiratory: Air entry bilaterally, no crackles or wheezing  GI: Abdomen distended and taut, nontender  Cardiovascular: RRR, no murmur  Extremities: Lower extremity edema noted, anasarca with swelling to his thighs with  some popped blistering and erythema superior aspect of the calfs bilaterally without drainage or warmth  Neuro: Alert and oriented x3, slow to respond with slow speech, cranial nerves II through XII gross intact confrontation        Result Review    Result Review:  I have personally reviewed the pertinent results from the past 24 hours to 8/26/2023 15:52 EDT and agree with these findings:  [x]  Laboratory   CBC          8/24/2023    05:48 8/25/2023    04:32 8/26/2023    04:45   CBC   WBC 5.10  5.28  6.64    RBC 3.28  3.51  3.43    Hemoglobin 11.3  12.7  11.8    Hematocrit 34.5  38.3  35.1    .2  109.1  102.3    MCH 34.5  36.2  34.4    MCHC 32.8  33.2  33.6    RDW 15.2  15.6  15.0    Platelets 307  279  381      BMP          8/24/2023    05:48 8/25/2023    04:31 8/26/2023    04:45   BMP   BUN 12  10  11    Creatinine 1.16  1.24  1.27    Sodium 125  125  129    Potassium 3.3  3.7  2.9    Chloride 89  87  88    CO2 23.2  23.9  30.9    Calcium 8.3  8.3  8.6    LIVER FUNCTION TESTS:      Lab 08/26/23  0445 08/25/23  0431 08/24/23  0548 08/23/23  0454 08/22/23  1134 08/22/23  0151   TOTAL PROTEIN 7.3 7.4 7.4 7.2 7.2 7.4   ALBUMIN 4.3 4.0 4.0 4.1 4.2 4.4   GLOBULIN  --   --   --   --  3.0 3.0   ALT (SGPT) 14 16 14 16 17 18   AST (SGOT) 57* 64* 65* 67* 73* 78*   BILIRUBIN 0.5 0.5 0.6 0.8 0.6 0.6   INDIRECT BILIRUBIN 0.3 0.3 0.4 0.5  --   --    BILIRUBIN DIRECT 0.2 0.2 0.2 0.3  --   --    ALK PHOS 100 102 102 109 105 111         [x]  Microbiology   Blood Culture   Date Value Ref Range Status   08/22/2023 No growth at 24 hours  Preliminary     No results found for: BCIDPCR, CXREFLEX, CSFCX, CULTURETIS  No results found for: CULTURES, HSVCX, URCX  No results found for: EYECULTURE, GCCX, HSVCULTURE, LABHSV  No results found for: LEGIONELLA, MRSACX, MUMPSCX, MYCOPLASCX  No results found for: NOCARDIACX, STOOLCX  No results found for: THROATCX, UNSTIMCULT, URINECX, CULTURE, VZVCULTUR  No results found for: VIRALCULTU,  WOUNDCX    [x]  Radiology Adult Transthoracic Echo Complete W/ Cont if Necessary Per Protocol    Result Date: 8/22/2023    The left ventricular cavity is severely dilated.   Left ventricular systolic function is severely decreased. Calculated left ventricular EF = 10.3%   Left ventricular diastolic function is consistent with (grade II w/high LAP) pseudonormalization.   Mildly reduced right ventricular systolic function noted.   The left atrial cavity is severely dilated.   Estimated right ventricular systolic pressure from tricuspid regurgitation is normal (<35 mmHg).   There is a small (<1cm) pericardial effusion posteriorly.     US Abdomen Complete    Result Date: 8/22/2023  PROCEDURE: US ABDOMEN COMPLETE  COMPARISON: None  INDICATIONS: Rule out ascites  TECHNIQUE: High resolution sonographic examination of the abdomen was performed.   FINDINGS:  There is underlying ascites within all 4 quadrants of the abdomen.        1. Evidence for ascites.     DARCI RODRIGUEZ MD       Electronically Signed and Approved By: DARCI RODRIGUEZ MD on 8/22/2023 at 20:32             XR Chest 1 View    Result Date: 8/22/2023  PROCEDURE: XR CHEST 1 VW  COMPARISON: None.  INDICATIONS: shortness of breath  FINDINGS:  A single AP upright portable chest radiograph reveals mild-to-moderate cardiomegaly, mild pulmonary edema with vascular congestion, minimal, if any, pleural effusion, no pneumothorax, and an old healed distal right clavicle fracture.  Mild thoracic aortic atherosclerotic change is possible.  There may be chronic calcified granulomatous disease of the chest.         1. There is mild-to-moderate cardiomegaly.   2. Mild pulmonary edema with vascular congestion is suggested.  Mild subsegmental atelectasis may involve the lung bases.  Pneumonia cannot be excluded entirely but is thought to be less likely.  3. Please see above comments for further detail.      Please note that portions of this note were completed with a voice  recognition program.  CORINA OLVERA JR, MD       Electronically Signed and Approved By: CORINA OLVERA JR, MD on 8/22/2023 at 3:05              Duplex Venous Lower Extremity - Right CV-READ    Result Date: 8/23/2023    Normal right lower extremity venous duplex scan.       [x]  EKG/Telemetry   []  Cardiology/Vascular   []  Pathology  [x]  Old records  []  Other:    Assessment & Plan   Assessment / Plan     Assessment/Plan:        Assessment:  Acute decompensation of combined heart failure; new diagnosis  Anasarca  Elevated troponin likely NSTEMI type II from cardiac strain from CHF exacerbation  Abdominal distention  Abdominal ascites likely secondary to heart failure and probably liver disease  Chronic alcohol use  Alcohol liver cirrhosis  Macrocytosis  Anemia  B12 deficiency     Plan:  Labs and imaging reviewed  We will continue to follow hypotension, asymptomatic, may need to hold certain medications including Entresto and/or metoprolol if he continues to have persistent hypotension  Continue doxycycline 100 mg twice a day for erythematous changes of his lower extremities  Replacement potassium 10 mEq x 5 rounds IV in addition to potassium phosphate 15 mmol x 1 run  Continue Lasix for 20 mg   Contacted interventional radiology, planning to pursue paracentesis on Monday, earliest available time  1500 mL oral fluid restriction continued  Trending serum sodium  Cardiology recommendations appreciated  Strict I's and O's  Daily weights  Continue on Jardiance 10 mg daily  Continue on Aldactone 25 mg daily  Continue on Entresto 24-26 mg at half tablet daily  Continue on metoprolol XL 25 mg daily  Continue cardiac diet  PT/OT  1000 mcg of B12 replacement daily IM  Rechecking chemistries to trend serum creatinine and electrolytes in a.m. patient undergoes aggressive diuresis  Follow CBC daily  CIWA protocol for possible withdrawal symptoms given patient's daily alcohol use  Further treatment contingent upon his hospital  course  DVT prophylaxis with SCD  Discussed plan with RN.    DVT prophylaxis:  Medical DVT prophylaxis orders are present.    CODE STATUS:   Level Of Support Discussed With: Patient  Code Status (Patient has no pulse and is not breathing): CPR (Attempt to Resuscitate)  Medical Interventions (Patient has pulse or is breathing): Full Support    Electronically signed by Orlando Corey MD, 08/26/23, 4:06 PM EDT.    Portions of this documentation were transcribed electronically from a voice recognition software.  I confirm all data accurately represents the service(s) I performed at today's visit.

## 2023-08-26 NOTE — PLAN OF CARE
Problem: Adult Inpatient Plan of Care  Goal: Plan of Care Review  Outcome: Ongoing, Progressing  Flowsheets (Taken 8/25/2023 2001)  Plan of Care Reviewed With: patient  Outcome Evaluation: Patient alert and oriented throughout shift. Patient pain treated per MAR. Patient went down for a cardiac cath today and returned to the unit with no signs of distress or bleeding. Patient plan of care discussed at bedside with family and patient. Will continue with patient plan of care.   Goal Outcome Evaluation:  Plan of Care Reviewed With: patient           Outcome Evaluation: Patient alert and oriented throughout shift. Patient pain treated per MAR. Patient went down for a cardiac cath today and returned to the unit with no signs of distress or bleeding. Patient plan of care discussed at bedside with family and patient. Will continue with patient plan of care.

## 2023-08-27 LAB
ALBUMIN SERPL-MCNC: 3.9 G/DL (ref 3.5–5.2)
ALP SERPL-CCNC: 91 U/L (ref 39–117)
ALT SERPL W P-5'-P-CCNC: 13 U/L (ref 1–41)
ANION GAP SERPL CALCULATED.3IONS-SCNC: 9.2 MMOL/L (ref 5–15)
AST SERPL-CCNC: 47 U/L (ref 1–40)
BACTERIA SPEC AEROBE CULT: NORMAL
BACTERIA SPEC AEROBE CULT: NORMAL
BASOPHILS # BLD AUTO: 0.06 10*3/MM3 (ref 0–0.2)
BASOPHILS NFR BLD AUTO: 0.9 % (ref 0–1.5)
BILIRUB CONJ SERPL-MCNC: 0.2 MG/DL (ref 0–0.3)
BILIRUB INDIRECT SERPL-MCNC: 0.2 MG/DL
BILIRUB SERPL-MCNC: 0.4 MG/DL (ref 0–1.2)
BUN SERPL-MCNC: 10 MG/DL (ref 6–20)
BUN/CREAT SERPL: 8.8 (ref 7–25)
CALCIUM SPEC-SCNC: 8.4 MG/DL (ref 8.6–10.5)
CHLORIDE SERPL-SCNC: 90 MMOL/L (ref 98–107)
CO2 SERPL-SCNC: 26.8 MMOL/L (ref 22–29)
CREAT SERPL-MCNC: 1.13 MG/DL (ref 0.76–1.27)
DEPRECATED RDW RBC AUTO: 54.8 FL (ref 37–54)
EGFRCR SERPLBLD CKD-EPI 2021: 78.7 ML/MIN/1.73
EOSINOPHIL # BLD AUTO: 0.25 10*3/MM3 (ref 0–0.4)
EOSINOPHIL NFR BLD AUTO: 3.8 % (ref 0.3–6.2)
ERYTHROCYTE [DISTWIDTH] IN BLOOD BY AUTOMATED COUNT: 15 % (ref 12.3–15.4)
GLUCOSE SERPL-MCNC: 114 MG/DL (ref 65–99)
HCT VFR BLD AUTO: 32.6 % (ref 37.5–51)
HGB BLD-MCNC: 11.6 G/DL (ref 13–17.7)
IMM GRANULOCYTES # BLD AUTO: 0.02 10*3/MM3 (ref 0–0.05)
IMM GRANULOCYTES NFR BLD AUTO: 0.3 % (ref 0–0.5)
LYMPHOCYTES # BLD AUTO: 1.96 10*3/MM3 (ref 0.7–3.1)
LYMPHOCYTES NFR BLD AUTO: 30 % (ref 19.6–45.3)
MAGNESIUM SERPL-MCNC: 1.5 MG/DL (ref 1.6–2.6)
MCH RBC QN AUTO: 35.5 PG (ref 26.6–33)
MCHC RBC AUTO-ENTMCNC: 35.6 G/DL (ref 31.5–35.7)
MCV RBC AUTO: 99.7 FL (ref 79–97)
MONOCYTES # BLD AUTO: 0.66 10*3/MM3 (ref 0.1–0.9)
MONOCYTES NFR BLD AUTO: 10.1 % (ref 5–12)
NEUTROPHILS NFR BLD AUTO: 3.59 10*3/MM3 (ref 1.7–7)
NEUTROPHILS NFR BLD AUTO: 54.9 % (ref 42.7–76)
NRBC BLD AUTO-RTO: 0 /100 WBC (ref 0–0.2)
PHOSPHATE SERPL-MCNC: 2.8 MG/DL (ref 2.5–4.5)
PLATELET # BLD AUTO: 336 10*3/MM3 (ref 140–450)
PMV BLD AUTO: 8.7 FL (ref 6–12)
POTASSIUM SERPL-SCNC: 3.5 MMOL/L (ref 3.5–5.2)
PROT SERPL-MCNC: 6.7 G/DL (ref 6–8.5)
RBC # BLD AUTO: 3.27 10*6/MM3 (ref 4.14–5.8)
SODIUM SERPL-SCNC: 126 MMOL/L (ref 136–145)
T-UPTAKE NFR SERPL: 1.27 TBI (ref 0.8–1.3)
T4 SERPL-MCNC: <0.42 MCG/DL (ref 4.5–11.7)
TSH SERPL DL<=0.05 MIU/L-ACNC: 53.5 UIU/ML (ref 0.27–4.2)
WBC NRBC COR # BLD: 6.54 10*3/MM3 (ref 3.4–10.8)

## 2023-08-27 PROCEDURE — 84436 ASSAY OF TOTAL THYROXINE: CPT | Performed by: FAMILY MEDICINE

## 2023-08-27 PROCEDURE — 25010000002 HEPARIN (PORCINE) PER 1000 UNITS: Performed by: INTERNAL MEDICINE

## 2023-08-27 PROCEDURE — 83735 ASSAY OF MAGNESIUM: CPT | Performed by: FAMILY MEDICINE

## 2023-08-27 PROCEDURE — 84443 ASSAY THYROID STIM HORMONE: CPT | Performed by: FAMILY MEDICINE

## 2023-08-27 PROCEDURE — 85025 COMPLETE CBC W/AUTO DIFF WBC: CPT | Performed by: FAMILY MEDICINE

## 2023-08-27 PROCEDURE — 25010000002 MAGNESIUM SULFATE 2 GM/50ML SOLUTION: Performed by: FAMILY MEDICINE

## 2023-08-27 PROCEDURE — 99232 SBSQ HOSP IP/OBS MODERATE 35: CPT | Performed by: FAMILY MEDICINE

## 2023-08-27 PROCEDURE — 80076 HEPATIC FUNCTION PANEL: CPT | Performed by: FAMILY MEDICINE

## 2023-08-27 PROCEDURE — 84100 ASSAY OF PHOSPHORUS: CPT | Performed by: FAMILY MEDICINE

## 2023-08-27 PROCEDURE — 84479 ASSAY OF THYROID (T3 OR T4): CPT | Performed by: FAMILY MEDICINE

## 2023-08-27 PROCEDURE — 80048 BASIC METABOLIC PNL TOTAL CA: CPT | Performed by: FAMILY MEDICINE

## 2023-08-27 RX ORDER — ALBUMIN (HUMAN) 12.5 G/50ML
25 SOLUTION INTRAVENOUS ONCE
Status: DISCONTINUED | OUTPATIENT
Start: 2023-08-27 | End: 2023-08-27

## 2023-08-27 RX ORDER — MAGNESIUM SULFATE HEPTAHYDRATE 40 MG/ML
2 INJECTION, SOLUTION INTRAVENOUS
Status: COMPLETED | OUTPATIENT
Start: 2023-08-27 | End: 2023-08-27

## 2023-08-27 RX ORDER — POTASSIUM CHLORIDE 750 MG/1
20 CAPSULE, EXTENDED RELEASE ORAL ONCE
Status: COMPLETED | OUTPATIENT
Start: 2023-08-27 | End: 2023-08-27

## 2023-08-27 RX ORDER — ALBUMIN (HUMAN) 12.5 G/50ML
25 SOLUTION INTRAVENOUS ONCE
Status: COMPLETED | OUTPATIENT
Start: 2023-08-28 | End: 2023-08-28

## 2023-08-27 RX ADMIN — MAGNESIUM SULFATE HEPTAHYDRATE 2 G: 40 INJECTION, SOLUTION INTRAVENOUS at 08:35

## 2023-08-27 RX ADMIN — FUROSEMIDE 20 MG: 20 TABLET ORAL at 08:38

## 2023-08-27 RX ADMIN — FOLIC ACID 1 MG: 1 TABLET ORAL at 08:36

## 2023-08-27 RX ADMIN — HYDROCODONE BITARTRATE AND ACETAMINOPHEN 1 TABLET: 10; 325 TABLET ORAL at 22:39

## 2023-08-27 RX ADMIN — Medication 1 TABLET: at 08:38

## 2023-08-27 RX ADMIN — Medication 10 ML: at 21:28

## 2023-08-27 RX ADMIN — SACUBITRIL AND VALSARTAN 0.5 TABLET: 24; 26 TABLET, FILM COATED ORAL at 21:27

## 2023-08-27 RX ADMIN — Medication 100 MG: at 08:36

## 2023-08-27 RX ADMIN — DOXYCYCLINE 100 MG: 100 CAPSULE ORAL at 21:28

## 2023-08-27 RX ADMIN — HEPARIN SODIUM 5000 UNITS: 5000 INJECTION INTRAVENOUS; SUBCUTANEOUS at 05:19

## 2023-08-27 RX ADMIN — SPIRONOLACTONE 25 MG: 25 TABLET ORAL at 08:38

## 2023-08-27 RX ADMIN — POTASSIUM CHLORIDE 20 MEQ: 10 CAPSULE, COATED, EXTENDED RELEASE ORAL at 17:44

## 2023-08-27 RX ADMIN — MAGNESIUM SULFATE HEPTAHYDRATE 2 G: 40 INJECTION, SOLUTION INTRAVENOUS at 11:21

## 2023-08-27 RX ADMIN — EMPAGLIFLOZIN 10 MG: 10 TABLET, FILM COATED ORAL at 08:38

## 2023-08-27 RX ADMIN — HYDROCODONE BITARTRATE AND ACETAMINOPHEN 1 TABLET: 10; 325 TABLET ORAL at 03:13

## 2023-08-27 RX ADMIN — LORAZEPAM 1 MG: 0.5 TABLET ORAL at 15:19

## 2023-08-27 RX ADMIN — HEPARIN SODIUM 5000 UNITS: 5000 INJECTION INTRAVENOUS; SUBCUTANEOUS at 22:39

## 2023-08-27 RX ADMIN — DOXYCYCLINE 100 MG: 100 CAPSULE ORAL at 08:36

## 2023-08-27 RX ADMIN — LORAZEPAM 1 MG: 0.5 TABLET ORAL at 21:27

## 2023-08-27 RX ADMIN — SACUBITRIL AND VALSARTAN 0.5 TABLET: 24; 26 TABLET, FILM COATED ORAL at 08:36

## 2023-08-27 RX ADMIN — HEPARIN SODIUM 5000 UNITS: 5000 INJECTION INTRAVENOUS; SUBCUTANEOUS at 15:10

## 2023-08-27 RX ADMIN — Medication 10 ML: at 08:38

## 2023-08-27 RX ADMIN — HYDROCODONE BITARTRATE AND ACETAMINOPHEN 1 TABLET: 10; 325 TABLET ORAL at 11:42

## 2023-08-27 RX ADMIN — METOPROLOL SUCCINATE 25 MG: 25 TABLET, EXTENDED RELEASE ORAL at 08:37

## 2023-08-27 RX ADMIN — WHITE PETROLATUM 1 APPLICATION: 1.75 OINTMENT TOPICAL at 15:10

## 2023-08-27 NOTE — PROGRESS NOTES
[facility]   Hospitalist Progress Note  Date: 2023  Patient Name: Greyson Schofield  : 1970  MRN: [id number]  Date of admission: 2023      Subjective   Subjective     Chief complaint: Shortness of breath, lower extremity edema    Summary:  53-year-old male with history of TBI, hospitalized on 2023 with shortness of breath and lower extremity edema, found to have elevated ALT, likely NSTEMI, type II MI from cardiac strain from CHF exacerbation, anasarca, with heart failure newly diagnosed with a EF 10 to 15%, severely dilated cardiomyopathy likely related to alcohol use, cardiology consulted, status post cardiac cath, normal coronaries.  Paracentesis pending.  Started antibiotics preemptively for skin breakdown behind proximal calfs  Arranging LifeVest    Interval follow-up: Seen and examined, no acute distress, no acute major overnight events, no chest pain or palpitations, continues to have abdominal distention awaiting paracentesis with IR.  Potassium has corrected with several runs of IV potassium to 3.5, still low, 20 mill equivalents of potassium ordered p.o.  Sort of depressed today, getting  on lifestyle changes.  Telemetry reviewed, no acute major arrhythmic events other than having.  First-degree AV block wide QRS.  Still shortness with exertion and lower calf swelling, urine output over the past 24 hours 725 mL, net -400 mL.  Sodium 126, creatinine 1.13. Bps are still trending low.    Review of systems:  All systems reviewed and negative except generalized fatigue, weakness, lower extremity edema, calf discomfort bilaterally, shortness of breath with exertion    Objective   Objective     Vitals:   Temp:  [97.5 °F (36.4 °C)-97.9 °F (36.6 °C)] 97.5 °F (36.4 °C)  Heart Rate:  [77-90] 83  Resp:  [18] 18  BP: ()/(63-73) 97/67  Physical Exam   Constitutional: Awake alert oriented no acute distress, depressed  Respiratory: Air entry bilaterally, no crackles or wheezing  GI:  Abdomen distended and taut, nontender  Cardiovascular: RRR, no murmur  Extremities: Lower extremity edema noted, anasarca with swelling to his thighs with some popped blistering and erythema superior aspect of the calfs bilaterally without drainage or warmth  Neuro: Alert and oriented x3, slow to respond with slow speech, cranial nerves II through XII gross intact confrontation    Result Review    Result Review:  I have personally reviewed the pertinent results from the past 24 hours to 8/27/2023 16:40 EDT and agree with these findings:  [x]  Laboratory   CBC          8/25/2023    04:32 8/26/2023    04:45 8/27/2023    04:45   CBC   WBC 5.28  6.64  6.54    RBC 3.51  3.43  3.27    Hemoglobin 12.7  11.8  11.6    Hematocrit 38.3  35.1  32.6    .1  102.3  99.7    MCH 36.2  34.4  35.5    MCHC 33.2  33.6  35.6    RDW 15.6  15.0  15.0    Platelets 279  381  336      BMP          8/25/2023    04:31 8/26/2023    04:45 8/27/2023    04:45   BMP   BUN 10  11  10    Creatinine 1.24  1.27  1.13    Sodium 125  129  126    Potassium 3.7  2.9  3.5    Chloride 87  88  90    CO2 23.9  30.9  26.8    Calcium 8.3  8.6  8.4    LIVER FUNCTION TESTS:      Lab 08/27/23  0445 08/26/23  0445 08/25/23  0431 08/24/23  0548 08/23/23  0454 08/22/23  1134 08/22/23  0151   TOTAL PROTEIN 6.7 7.3 7.4 7.4 7.2 7.2 7.4   ALBUMIN 3.9 4.3 4.0 4.0 4.1 4.2 4.4   GLOBULIN  --   --   --   --   --  3.0 3.0   ALT (SGPT) 13 14 16 14 16 17 18   AST (SGOT) 47* 57* 64* 65* 67* 73* 78*   BILIRUBIN 0.4 0.5 0.5 0.6 0.8 0.6 0.6   INDIRECT BILIRUBIN 0.2 0.3 0.3 0.4 0.5  --   --    BILIRUBIN DIRECT 0.2 0.2 0.2 0.2 0.3  --   --    ALK PHOS 91 100 102 102 109 105 111         [x]  Microbiology   Blood Culture   Date Value Ref Range Status   08/22/2023 No growth at 24 hours  Preliminary     No results found for: BCIDPCR, CXREFLEX, CSFCX, CULTURETIS  No results found for: CULTURES, HSVCX, URCX  No results found for: EYECULTURE, GCCX, HSVCULTURE, LABHSV  No results found  for: LEGIONELLA, MRSACX, MUMPSCX, MYCOPLASCX  No results found for: NOCARDIACX, STOOLCX  No results found for: THROATCX, UNSTIMCULT, URINECX, CULTURE, VZVCULTUR  No results found for: VIRALCULTU, WOUNDCX    [x]  Radiology Adult Transthoracic Echo Complete W/ Cont if Necessary Per Protocol    Result Date: 8/22/2023  •  The left ventricular cavity is severely dilated. •  Left ventricular systolic function is severely decreased. Calculated left ventricular EF = 10.3% •  Left ventricular diastolic function is consistent with (grade II w/high LAP) pseudonormalization. •  Mildly reduced right ventricular systolic function noted. •  The left atrial cavity is severely dilated. •  Estimated right ventricular systolic pressure from tricuspid regurgitation is normal (<35 mmHg). •  There is a small (<1cm) pericardial effusion posteriorly.     US Abdomen Complete    Result Date: 8/22/2023  PROCEDURE: US ABDOMEN COMPLETE  COMPARISON: None  INDICATIONS: Rule out ascites  TECHNIQUE: High resolution sonographic examination of the abdomen was performed.   FINDINGS:  There is underlying ascites within all 4 quadrants of the abdomen.        1. Evidence for ascites.     DARCI RODRIGUEZ MD       Electronically Signed and Approved By: DARCI RODRIGUEZ MD on 8/22/2023 at 20:32             XR Chest 1 View    Result Date: 8/22/2023  PROCEDURE: XR CHEST 1 VW  COMPARISON: None.  INDICATIONS: shortness of breath  FINDINGS:  A single AP upright portable chest radiograph reveals mild-to-moderate cardiomegaly, mild pulmonary edema with vascular congestion, minimal, if any, pleural effusion, no pneumothorax, and an old healed distal right clavicle fracture.  Mild thoracic aortic atherosclerotic change is possible.  There may be chronic calcified granulomatous disease of the chest.         1. There is mild-to-moderate cardiomegaly.   2. Mild pulmonary edema with vascular congestion is suggested.  Mild subsegmental atelectasis may involve the lung bases.   Pneumonia cannot be excluded entirely but is thought to be less likely.  3. Please see above comments for further detail.      Please note that portions of this note were completed with a voice recognition program.  CORINA OLVERA JR, MD       Electronically Signed and Approved By: CORINA OLVERA JR, MD on 8/22/2023 at 3:05              Duplex Venous Lower Extremity - Right CV-READ    Result Date: 8/23/2023  •  Normal right lower extremity venous duplex scan.       [x]  EKG/Telemetry   []  Cardiology/Vascular   []  Pathology  [x]  Old records  []  Other:    Assessment & Plan   Assessment / Plan     Assessment/Plan:    Assessment:  Acute decompensation of combined heart failure; new diagnosis  Anasarca  Elevated troponin likely NSTEMI type II from cardiac strain from CHF exacerbation  Abdominal distention  Abdominal ascites likely secondary to heart failure and probably liver disease  Chronic alcohol use  Alcohol liver cirrhosis  Macrocytosis  Anemia  B12 deficiency     Plan:  Labs and imaging reviewed  Paracentesis tomorrow  With ongoing hypotension, will need albumin replacement  Check INR and PTT in the morning  We will continue to follow hypotension, asymptomatic, may need to hold certain medications including Entresto and/or metoprolol if he continues to have persistent hypotension  Continue doxycycline 100 mg twice a day for erythematous changes of his lower extremities  Replacement potassium 20 mill equivalents orally  Continue Lasix for 20 mg daily  Contacted interventional radiology, planning to pursue paracentesis on Monday, earliest available time  1500 mL oral fluid restriction continued  Trending serum sodium  Cardiology recommendations appreciated  Strict I's and O's  Daily weights  Continue on Jardiance 10 mg daily  Continue on Aldactone 25 mg daily  Continue on Entresto 24-26 mg at half tablet daily  Continue on metoprolol XL 25 mg daily  Continue cardiac diet  PT/OT  1000 mcg of B12 replacement daily  IM  Rechecking chemistries to trend serum creatinine and electrolytes in a.m. patient undergoes aggressive diuresis  Follow CBC daily  CIWA protocol for possible withdrawal symptoms given patient's daily alcohol use  Further treatment contingent upon his hospital course  DVT prophylaxis with SCD  Discussed plan with RN.      DVT prophylaxis:  Medical DVT prophylaxis orders are present.    CODE STATUS:   Level Of Support Discussed With: Patient  Code Status (Patient has no pulse and is not breathing): CPR (Attempt to Resuscitate)  Medical Interventions (Patient has pulse or is breathing): Full Support    Electronically signed by Orlando Corey MD, 08/27/23, 4:45 PM EDT.    Portions of this documentation were transcribed electronically from a voice recognition software.  I confirm all data accurately represents the service(s) I performed at today's visit.

## 2023-08-27 NOTE — PLAN OF CARE
Problem: Adult Inpatient Plan of Care  Goal: Optimal Comfort and Wellbeing  Outcome: Ongoing, Progressing  Intervention: Monitor Pain and Promote Comfort  Recent Flowsheet Documentation  Taken 8/27/2023 0313 by Elisha Greene RN  Pain Management Interventions: see MAR  Intervention: Provide Person-Centered Care  Recent Flowsheet Documentation  Taken 8/26/2023 2113 by Elisha Greene, RN  Trust Relationship/Rapport:   care explained   choices provided   questions answered   Goal Outcome Evaluation:                      Pt has rested well throughout night. Given pain pill per MAR. Pt has concerns whether or not he will be discharged with pain pills and/or anxiety meds.

## 2023-08-28 ENCOUNTER — APPOINTMENT (OUTPATIENT)
Dept: INTERVENTIONAL RADIOLOGY/VASCULAR | Facility: HOSPITAL | Age: 53
DRG: 281 | End: 2023-08-28
Payer: COMMERCIAL

## 2023-08-28 ENCOUNTER — TELEPHONE (OUTPATIENT)
Dept: FAMILY MEDICINE CLINIC | Facility: CLINIC | Age: 53
End: 2023-08-28

## 2023-08-28 ENCOUNTER — APPOINTMENT (OUTPATIENT)
Dept: GENERAL RADIOLOGY | Facility: HOSPITAL | Age: 53
DRG: 281 | End: 2023-08-28
Payer: COMMERCIAL

## 2023-08-28 LAB
ALBUMIN FLD-MCNC: 2.7 G/DL
ALBUMIN SERPL-MCNC: 4 G/DL (ref 3.5–5.2)
ALP SERPL-CCNC: 92 U/L (ref 39–117)
ALT SERPL W P-5'-P-CCNC: 13 U/L (ref 1–41)
AMYLASE FLD-CCNC: 52 U/L
ANION GAP SERPL CALCULATED.3IONS-SCNC: 10.4 MMOL/L (ref 5–15)
APPEARANCE FLD: ABNORMAL
APTT PPP: 37.8 SECONDS (ref 24.2–34.2)
AST SERPL-CCNC: 50 U/L (ref 1–40)
BASOPHILS # BLD AUTO: 0.06 10*3/MM3 (ref 0–0.2)
BASOPHILS NFR BLD AUTO: 1 % (ref 0–1.5)
BILIRUB CONJ SERPL-MCNC: 0.2 MG/DL (ref 0–0.3)
BILIRUB INDIRECT SERPL-MCNC: 0.2 MG/DL
BILIRUB SERPL-MCNC: 0.4 MG/DL (ref 0–1.2)
BUN SERPL-MCNC: 12 MG/DL (ref 6–20)
BUN/CREAT SERPL: 9.5 (ref 7–25)
CALCIUM SPEC-SCNC: 8.6 MG/DL (ref 8.6–10.5)
CHLORIDE SERPL-SCNC: 90 MMOL/L (ref 98–107)
CO2 SERPL-SCNC: 26.6 MMOL/L (ref 22–29)
COLOR FLD: ABNORMAL
CREAT SERPL-MCNC: 1.26 MG/DL (ref 0.76–1.27)
DEPRECATED RDW RBC AUTO: 57.5 FL (ref 37–54)
EGFRCR SERPLBLD CKD-EPI 2021: 69.1 ML/MIN/1.73
EOSINOPHIL # BLD AUTO: 0.17 10*3/MM3 (ref 0–0.4)
EOSINOPHIL NFR BLD AUTO: 2.7 % (ref 0.3–6.2)
ERYTHROCYTE [DISTWIDTH] IN BLOOD BY AUTOMATED COUNT: 15.3 % (ref 12.3–15.4)
GEN 5 2HR TROPONIN T REFLEX: 125 NG/L
GLUCOSE FLD-MCNC: 108 MG/DL
GLUCOSE SERPL-MCNC: 93 MG/DL (ref 65–99)
HCT VFR BLD AUTO: 33.7 % (ref 37.5–51)
HGB BLD-MCNC: 11.4 G/DL (ref 13–17.7)
IMM GRANULOCYTES # BLD AUTO: 0.02 10*3/MM3 (ref 0–0.05)
IMM GRANULOCYTES NFR BLD AUTO: 0.3 % (ref 0–0.5)
INR PPP: 1.06 (ref 0.86–1.15)
LDH FLD-CCNC: 240 U/L
LYMPHOCYTES # BLD AUTO: 1.97 10*3/MM3 (ref 0.7–3.1)
LYMPHOCYTES NFR BLD AUTO: 31.3 % (ref 19.6–45.3)
LYMPHOCYTES NFR FLD MANUAL: 22 %
MACROPHAGE FLUID: 26 %
MAGNESIUM SERPL-MCNC: 2.3 MG/DL (ref 1.6–2.6)
MCH RBC QN AUTO: 34.4 PG (ref 26.6–33)
MCHC RBC AUTO-ENTMCNC: 33.8 G/DL (ref 31.5–35.7)
MCV RBC AUTO: 101.8 FL (ref 79–97)
MESOTHL CELL NFR FLD MANUAL: 1 %
MONOCYTES # BLD AUTO: 0.65 10*3/MM3 (ref 0.1–0.9)
MONOCYTES NFR BLD AUTO: 10.3 % (ref 5–12)
MONOCYTES NFR FLD: 44 %
NEUTROPHILS NFR BLD AUTO: 3.43 10*3/MM3 (ref 1.7–7)
NEUTROPHILS NFR BLD AUTO: 54.4 % (ref 42.7–76)
NEUTROPHILS NFR FLD MANUAL: 7 %
NRBC BLD AUTO-RTO: 0 /100 WBC (ref 0–0.2)
NUC CELL # FLD: 283 /MM3
PHOSPHATE SERPL-MCNC: 2.6 MG/DL (ref 2.5–4.5)
PLATELET # BLD AUTO: 351 10*3/MM3 (ref 140–450)
PMV BLD AUTO: 8.9 FL (ref 6–12)
POTASSIUM SERPL-SCNC: 3.6 MMOL/L (ref 3.5–5.2)
PROT FLD-MCNC: 3.9 G/DL
PROT SERPL-MCNC: 6.9 G/DL (ref 6–8.5)
PROTHROMBIN TIME: 13.9 SECONDS (ref 11.8–14.9)
RBC # BLD AUTO: 3.31 10*6/MM3 (ref 4.14–5.8)
RBC # FLD AUTO: <2000 /MM3
SODIUM SERPL-SCNC: 127 MMOL/L (ref 136–145)
TROPONIN T DELTA: 5 NG/L
TROPONIN T SERPL HS-MCNC: 120 NG/L
WBC NRBC COR # BLD: 6.3 10*3/MM3 (ref 3.4–10.8)

## 2023-08-28 PROCEDURE — 82247 BILIRUBIN TOTAL: CPT | Performed by: FAMILY MEDICINE

## 2023-08-28 PROCEDURE — 25010000002 HEPARIN (PORCINE) PER 1000 UNITS: Performed by: INTERNAL MEDICINE

## 2023-08-28 PROCEDURE — 82150 ASSAY OF AMYLASE: CPT | Performed by: FAMILY MEDICINE

## 2023-08-28 PROCEDURE — 88108 CYTOPATH CONCENTRATE TECH: CPT | Performed by: FAMILY MEDICINE

## 2023-08-28 PROCEDURE — 93005 ELECTROCARDIOGRAM TRACING: CPT | Performed by: FAMILY MEDICINE

## 2023-08-28 PROCEDURE — 84484 ASSAY OF TROPONIN QUANT: CPT | Performed by: FAMILY MEDICINE

## 2023-08-28 PROCEDURE — 87075 CULTR BACTERIA EXCEPT BLOOD: CPT | Performed by: FAMILY MEDICINE

## 2023-08-28 PROCEDURE — 99233 SBSQ HOSP IP/OBS HIGH 50: CPT | Performed by: FAMILY MEDICINE

## 2023-08-28 PROCEDURE — 82042 OTHER SOURCE ALBUMIN QUAN EA: CPT | Performed by: FAMILY MEDICINE

## 2023-08-28 PROCEDURE — 89051 BODY FLUID CELL COUNT: CPT | Performed by: FAMILY MEDICINE

## 2023-08-28 PROCEDURE — 85025 COMPLETE CBC W/AUTO DIFF WBC: CPT | Performed by: FAMILY MEDICINE

## 2023-08-28 PROCEDURE — P9047 ALBUMIN (HUMAN), 25%, 50ML: HCPCS | Performed by: FAMILY MEDICINE

## 2023-08-28 PROCEDURE — 25010000002 ALBUMIN HUMAN 25% PER 50 ML: Performed by: FAMILY MEDICINE

## 2023-08-28 PROCEDURE — 76942 ECHO GUIDE FOR BIOPSY: CPT

## 2023-08-28 PROCEDURE — 85610 PROTHROMBIN TIME: CPT | Performed by: FAMILY MEDICINE

## 2023-08-28 PROCEDURE — 71045 X-RAY EXAM CHEST 1 VIEW: CPT

## 2023-08-28 PROCEDURE — 80048 BASIC METABOLIC PNL TOTAL CA: CPT | Performed by: FAMILY MEDICINE

## 2023-08-28 PROCEDURE — 83735 ASSAY OF MAGNESIUM: CPT | Performed by: FAMILY MEDICINE

## 2023-08-28 PROCEDURE — 87015 SPECIMEN INFECT AGNT CONCNTJ: CPT | Performed by: FAMILY MEDICINE

## 2023-08-28 PROCEDURE — 83615 LACTATE (LD) (LDH) ENZYME: CPT | Performed by: FAMILY MEDICINE

## 2023-08-28 PROCEDURE — 93010 ELECTROCARDIOGRAM REPORT: CPT | Performed by: INTERNAL MEDICINE

## 2023-08-28 PROCEDURE — 87070 CULTURE OTHR SPECIMN AEROBIC: CPT | Performed by: FAMILY MEDICINE

## 2023-08-28 PROCEDURE — C1729 CATH, DRAINAGE: HCPCS

## 2023-08-28 PROCEDURE — 84157 ASSAY OF PROTEIN OTHER: CPT | Performed by: FAMILY MEDICINE

## 2023-08-28 PROCEDURE — 82945 GLUCOSE OTHER FLUID: CPT | Performed by: FAMILY MEDICINE

## 2023-08-28 PROCEDURE — 0W9G3ZZ DRAINAGE OF PERITONEAL CAVITY, PERCUTANEOUS APPROACH: ICD-10-PCS | Performed by: RADIOLOGY

## 2023-08-28 PROCEDURE — 87102 FUNGUS ISOLATION CULTURE: CPT | Performed by: FAMILY MEDICINE

## 2023-08-28 PROCEDURE — 80076 HEPATIC FUNCTION PANEL: CPT | Performed by: FAMILY MEDICINE

## 2023-08-28 PROCEDURE — 84100 ASSAY OF PHOSPHORUS: CPT | Performed by: FAMILY MEDICINE

## 2023-08-28 PROCEDURE — 87205 SMEAR GRAM STAIN: CPT | Performed by: FAMILY MEDICINE

## 2023-08-28 PROCEDURE — 85730 THROMBOPLASTIN TIME PARTIAL: CPT | Performed by: FAMILY MEDICINE

## 2023-08-28 RX ORDER — ALBUMIN (HUMAN) 12.5 G/50ML
25 SOLUTION INTRAVENOUS 3 TIMES DAILY
Status: COMPLETED | OUTPATIENT
Start: 2023-08-28 | End: 2023-08-29

## 2023-08-28 RX ORDER — ALPRAZOLAM 0.25 MG/1
0.5 TABLET ORAL 4 TIMES DAILY PRN
Status: DISCONTINUED | OUTPATIENT
Start: 2023-08-28 | End: 2023-08-31 | Stop reason: HOSPADM

## 2023-08-28 RX ORDER — LEVOTHYROXINE SODIUM 0.1 MG/1
100 TABLET ORAL
Status: DISCONTINUED | OUTPATIENT
Start: 2023-08-28 | End: 2023-08-31 | Stop reason: HOSPADM

## 2023-08-28 RX ORDER — LIDOCAINE HYDROCHLORIDE 20 MG/ML
20 INJECTION, SOLUTION INFILTRATION; PERINEURAL ONCE
Status: COMPLETED | OUTPATIENT
Start: 2023-08-28 | End: 2023-08-28

## 2023-08-28 RX ADMIN — SACUBITRIL AND VALSARTAN 0.5 TABLET: 24; 26 TABLET, FILM COATED ORAL at 21:30

## 2023-08-28 RX ADMIN — ALBUMIN HUMAN 25 G: 0.25 SOLUTION INTRAVENOUS at 14:44

## 2023-08-28 RX ADMIN — FUROSEMIDE 20 MG: 20 TABLET ORAL at 10:07

## 2023-08-28 RX ADMIN — ALPRAZOLAM 0.5 MG: 0.25 TABLET ORAL at 10:32

## 2023-08-28 RX ADMIN — SPIRONOLACTONE 25 MG: 25 TABLET ORAL at 10:06

## 2023-08-28 RX ADMIN — METOPROLOL SUCCINATE 25 MG: 25 TABLET, EXTENDED RELEASE ORAL at 10:06

## 2023-08-28 RX ADMIN — HEPARIN SODIUM 5000 UNITS: 5000 INJECTION INTRAVENOUS; SUBCUTANEOUS at 21:29

## 2023-08-28 RX ADMIN — Medication 100 MG: at 10:06

## 2023-08-28 RX ADMIN — SACUBITRIL AND VALSARTAN 0.5 TABLET: 24; 26 TABLET, FILM COATED ORAL at 10:06

## 2023-08-28 RX ADMIN — HYDROCODONE BITARTRATE AND ACETAMINOPHEN 1 TABLET: 10; 325 TABLET ORAL at 21:29

## 2023-08-28 RX ADMIN — Medication 1 TABLET: at 10:06

## 2023-08-28 RX ADMIN — EMPAGLIFLOZIN 10 MG: 10 TABLET, FILM COATED ORAL at 10:06

## 2023-08-28 RX ADMIN — Medication 10 ML: at 21:30

## 2023-08-28 RX ADMIN — ALBUMIN (HUMAN) 25 G: 0.25 INJECTION, SOLUTION INTRAVENOUS at 10:05

## 2023-08-28 RX ADMIN — DOXYCYCLINE 100 MG: 100 CAPSULE ORAL at 21:59

## 2023-08-28 RX ADMIN — ALBUMIN HUMAN 25 G: 0.25 SOLUTION INTRAVENOUS at 21:59

## 2023-08-28 RX ADMIN — HYDROCODONE BITARTRATE AND ACETAMINOPHEN 1 TABLET: 5; 325 TABLET ORAL at 15:14

## 2023-08-28 RX ADMIN — FOLIC ACID 1 MG: 1 TABLET ORAL at 10:06

## 2023-08-28 RX ADMIN — WHITE PETROLATUM 1 APPLICATION: 1.75 OINTMENT TOPICAL at 10:08

## 2023-08-28 RX ADMIN — LIDOCAINE HYDROCHLORIDE 20 ML: 20 INJECTION, SOLUTION INFILTRATION; PERINEURAL at 11:15

## 2023-08-28 RX ADMIN — DOXYCYCLINE 100 MG: 100 CAPSULE ORAL at 10:06

## 2023-08-28 RX ADMIN — HEPARIN SODIUM 5000 UNITS: 5000 INJECTION INTRAVENOUS; SUBCUTANEOUS at 06:34

## 2023-08-28 RX ADMIN — HEPARIN SODIUM 5000 UNITS: 5000 INJECTION INTRAVENOUS; SUBCUTANEOUS at 14:41

## 2023-08-28 RX ADMIN — Medication 10 ML: at 10:07

## 2023-08-28 RX ADMIN — ALPRAZOLAM 0.5 MG: 0.25 TABLET ORAL at 19:18

## 2023-08-28 RX ADMIN — SODIUM BICARBONATE 1 MEQ: 84 INJECTION INTRAVENOUS at 11:15

## 2023-08-28 NOTE — PROGRESS NOTES
[facility]   Hospitalist Progress Note  Date: 2023  Patient Name: Greyson Schofield  : 1970  MRN: [id number]  Date of admission: 2023      Subjective   Subjective     Chief complaint: Shortness of breath, lower extremity edema    Summary:  53-year-old male with history of TBI, hospitalized on 2023 with shortness of breath and lower extremity edema, found to have elevated ALT, likely NSTEMI, type II MI from cardiac strain from CHF exacerbation, anasarca, with heart failure newly diagnosed with a EF 10 to 15%, severely dilated cardiomyopathy likely related to alcohol use, cardiology consulted, status post cardiac cath, normal coronaries.  Paracentesis pending.  TSH high, total T4 low literally nonexistent, started on levothyroxine 100 mcg daily, will need repeat TSH and total T4 in 6 weeks, started antibiotics preemptively for skin breakdown behind proximal calfs, arranged LifeVest    Interval follow-up: Seen and examined, no acute distress, no acute major overnight events, no chest pain or palpitations, paracentesis today, still awaiting LifeVest delivery.  TSH checked 53, total T4 nonexistent, started on levothyroxine at 100 mcg daily, will need repeat TSH and total T4 in 6 weeks for adjustments.  Discussed underlying liver cirrhosis, all questions answered.  Will need GI referral as outpatient for monitoring.  Lower extremity edema improving.  Shortness of breath better.  Creatinine 1.26, potassium 3.6, sodium 127, white blood cell count 6000, hemoglobin 11.4.  I was told after rounds, 1.9 L of mendes ascitic fluid was drained, he will need further albumin replacement post procedure as his blood pressures are critically low.  80s over 60s.    Review of systems:  All systems reviewed and negative except generalized fatigue, weakness, lower extremity edema    Objective   Objective     Vitals:   Temp:  [97.3 °F (36.3 °C)-98.1 °F (36.7 °C)] 97.3 °F (36.3 °C)  Heart Rate:  [82-88] 83  Resp:   [18-20] 18  BP: ()/(44-75) 88/64  Physical Exam   Constitutional: Awake alert oriented no acute distress  Respiratory: Air entry bilaterally, no crackles or wheezing  GI: Abdomen distended and taut, nontender  Cardiovascular: RRR, no murmur  Extremities: Lower extremity edema noted, anasarca with swelling to his thighs with some popped blistering and erythema superior aspect of the calfs bilaterally without drainage or warmth  Neuro: Alert and oriented x3, slow to respond with slow speech, cranial nerves II through XII gross intact confrontation    Result Review    Result Review:  I have personally reviewed the pertinent results from the past 24 hours to 8/28/2023 12:59 EDT and agree with these findings:  [x]  Laboratory   CBC          8/26/2023    04:45 8/27/2023    04:45 8/28/2023    04:28   CBC   WBC 6.64  6.54  6.30    RBC 3.43  3.27  3.31    Hemoglobin 11.8  11.6  11.4    Hematocrit 35.1  32.6  33.7    .3  99.7  101.8    MCH 34.4  35.5  34.4    MCHC 33.6  35.6  33.8    RDW 15.0  15.0  15.3    Platelets 381  336  351      BMP          8/26/2023    04:45 8/27/2023    04:45 8/28/2023    04:28   BMP   BUN 11  10  12    Creatinine 1.27  1.13  1.26    Sodium 129  126  127    Potassium 2.9  3.5  3.6    Chloride 88  90  90    CO2 30.9  26.8  26.6    Calcium 8.6  8.4  8.6    LIVER FUNCTION TESTS:      Lab 08/28/23  0428 08/27/23  0445 08/26/23  0445 08/25/23  0431 08/24/23  0548 08/23/23  0454 08/22/23  1134 08/22/23  0151   TOTAL PROTEIN 6.9 6.7 7.3 7.4 7.4   < > 7.2 7.4   ALBUMIN 4.0 3.9 4.3 4.0 4.0   < > 4.2 4.4   GLOBULIN  --   --   --   --   --   --  3.0 3.0   ALT (SGPT) 13 13 14 16 14   < > 17 18   AST (SGOT) 50* 47* 57* 64* 65*   < > 73* 78*   BILIRUBIN 0.4 0.4 0.5 0.5 0.6   < > 0.6 0.6   INDIRECT BILIRUBIN 0.2 0.2 0.3 0.3 0.4   < >  --   --    BILIRUBIN DIRECT 0.2 0.2 0.2 0.2 0.2   < >  --   --    ALK PHOS 92 91 100 102 102   < > 105 111    < > = values in this interval not displayed.         [x]   Microbiology   Blood Culture   Date Value Ref Range Status   08/22/2023 No growth at 24 hours  Preliminary     No results found for: BCIDPCR, CXREFLEX, CSFCX, CULTURETIS  No results found for: CULTURES, HSVCX, URCX  No results found for: EYECULTURE, GCCX, HSVCULTURE, LABHSV  No results found for: LEGIONELLA, MRSACX, MUMPSCX, MYCOPLASCX  No results found for: NOCARDIACX, STOOLCX  No results found for: THROATCX, UNSTIMCULT, URINECX, CULTURE, VZVCULTUR  No results found for: VIRALCULTU, WOUNDCX    [x]  Radiology Adult Transthoracic Echo Complete W/ Cont if Necessary Per Protocol    Result Date: 8/22/2023  •  The left ventricular cavity is severely dilated. •  Left ventricular systolic function is severely decreased. Calculated left ventricular EF = 10.3% •  Left ventricular diastolic function is consistent with (grade II w/high LAP) pseudonormalization. •  Mildly reduced right ventricular systolic function noted. •  The left atrial cavity is severely dilated. •  Estimated right ventricular systolic pressure from tricuspid regurgitation is normal (<35 mmHg). •  There is a small (<1cm) pericardial effusion posteriorly.     US Abdomen Complete    Result Date: 8/22/2023  PROCEDURE: US ABDOMEN COMPLETE  COMPARISON: None  INDICATIONS: Rule out ascites  TECHNIQUE: High resolution sonographic examination of the abdomen was performed.   FINDINGS:  There is underlying ascites within all 4 quadrants of the abdomen.        1. Evidence for ascites.     DARCI RODRIGUEZ MD       Electronically Signed and Approved By: DARCI RODRIGUEZ MD on 8/22/2023 at 20:32             XR Chest 1 View    Result Date: 8/22/2023  PROCEDURE: XR CHEST 1 VW  COMPARISON: None.  INDICATIONS: shortness of breath  FINDINGS:  A single AP upright portable chest radiograph reveals mild-to-moderate cardiomegaly, mild pulmonary edema with vascular congestion, minimal, if any, pleural effusion, no pneumothorax, and an old healed distal right clavicle fracture.  Mild  thoracic aortic atherosclerotic change is possible.  There may be chronic calcified granulomatous disease of the chest.         1. There is mild-to-moderate cardiomegaly.   2. Mild pulmonary edema with vascular congestion is suggested.  Mild subsegmental atelectasis may involve the lung bases.  Pneumonia cannot be excluded entirely but is thought to be less likely.  3. Please see above comments for further detail.      Please note that portions of this note were completed with a voice recognition program.  CORINA OLVERA JR, MD       Electronically Signed and Approved By: CORINA OLVERA JR, MD on 8/22/2023 at 3:05              Duplex Venous Lower Extremity - Right CV-READ    Result Date: 8/23/2023  •  Normal right lower extremity venous duplex scan.       [x]  EKG/Telemetry   []  Cardiology/Vascular   []  Pathology  [x]  Old records  []  Other:    Assessment & Plan   Assessment / Plan     Assessment/Plan:    Assessment:  Acute decompensation of combined heart failure; new diagnosis  Anasarca  Elevated troponin likely NSTEMI type II from cardiac strain from CHF exacerbation  Abdominal distention  Abdominal ascites likely secondary to heart failure and probably liver disease  Chronic alcohol use  Untreated hypothyroidism  Alcohol liver cirrhosis  Macrocytosis  Anemia  B12 deficiency     Plan:  Labs and imaging reviewed  Start levothyroxine 100 mcg daily, repeat TSH and total T4 in 6 weeks, adjust accordingly as outpatient  LifeVest today  Post paracentesis, blood pressures 80s over 60s  Switch Ativan to Xanax 0.5 mg 4 times daily as needed for anxiety  25 g of albumin replacement x3 doses today IV  We will continue to follow hypotension, may need to hold certain medications including Entresto and/or metoprolol if he continues to have persistent hypotension  Continue doxycycline 100 mg twice a day for erythematous changes of his lower extremities  Continue Lasix for 20 mg daily  1500 mL oral fluid restriction  continued  Trending serum sodium  Cardiology recommendations appreciated  Strict I's and O's  Daily weights  Continue on Jardiance 10 mg daily  Continue on Aldactone 25 mg daily  Continue on Entresto 24-26 mg at half tablet daily  GI referral for liver cirrhosis as outpatient  Continue on metoprolol XL 25 mg daily  Continue cardiac diet  PT/OT  1000 mcg of B12 replacement daily IM  Rechecking chemistries to trend serum creatinine and electrolytes in a.m. patient undergoes aggressive diuresis  Follow CBC daily  Further treatment contingent upon his hospital course  DVT prophylaxis with SCD  Discussed plan with RN.    DVT prophylaxis:  Medical DVT prophylaxis orders are present.    CODE STATUS:   Level Of Support Discussed With: Patient  Code Status (Patient has no pulse and is not breathing): CPR (Attempt to Resuscitate)  Medical Interventions (Patient has pulse or is breathing): Full Support    Electronically signed by Orlando Corey MD, 08/28/23, 1:07 PM EDT.    Portions of this documentation were transcribed electronically from a voice recognition software.  I confirm all data accurately represents the service(s) I performed at today's visit.

## 2023-08-28 NOTE — TELEPHONE ENCOUNTER
Caller: Greyson Schofield    Relationship to patient: Self    Best call back number: [phone]     New or established patient?  [x] New  [] Established    Date of discharge: 08/28/2023     Facility discharged from: [facility]

## 2023-08-28 NOTE — PLAN OF CARE
Problem: Adult Inpatient Plan of Care  Goal: Plan of Care Review  Outcome: Ongoing, Progressing  Flowsheets  Taken 8/27/2023 2007 by Clau Burch, RN  Outcome Evaluation: Patient on room air. Patient medicated with norco once for pain. Patient complained of anxiety and medicated with ativan PO. 2 runs of magnesium administered. Paracentesis to be completed tomorrow.  Taken 8/25/2023 2001 by Tracy Houser RN  Plan of Care Reviewed With: patient   Goal Outcome Evaluation:              Outcome Evaluation: Patient on room air. Patient medicated with norco once for pain. Patient complained of anxiety and medicated with ativan PO. 2 runs of magnesium administered. Paracentesis to be completed tomorrow.

## 2023-08-29 LAB
ALBUMIN SERPL-MCNC: 4.6 G/DL (ref 3.5–5.2)
ALP SERPL-CCNC: 75 U/L (ref 39–117)
ALT SERPL W P-5'-P-CCNC: 13 U/L (ref 1–41)
ANION GAP SERPL CALCULATED.3IONS-SCNC: 9 MMOL/L (ref 5–15)
AST SERPL-CCNC: 46 U/L (ref 1–40)
BASOPHILS # BLD AUTO: 0.06 10*3/MM3 (ref 0–0.2)
BASOPHILS NFR BLD AUTO: 1 % (ref 0–1.5)
BILIRUB CONJ SERPL-MCNC: 0.2 MG/DL (ref 0–0.3)
BILIRUB INDIRECT SERPL-MCNC: 0.3 MG/DL
BILIRUB SERPL-MCNC: 0.5 MG/DL (ref 0–1.2)
BUN SERPL-MCNC: 14 MG/DL (ref 6–20)
BUN/CREAT SERPL: 12.3 (ref 7–25)
CALCIUM SPEC-SCNC: 8.8 MG/DL (ref 8.6–10.5)
CHLORIDE SERPL-SCNC: 91 MMOL/L (ref 98–107)
CO2 SERPL-SCNC: 27 MMOL/L (ref 22–29)
CREAT SERPL-MCNC: 1.14 MG/DL (ref 0.76–1.27)
DEPRECATED RDW RBC AUTO: 57.3 FL (ref 37–54)
EGFRCR SERPLBLD CKD-EPI 2021: 77.9 ML/MIN/1.73
EOSINOPHIL # BLD AUTO: 0.15 10*3/MM3 (ref 0–0.4)
EOSINOPHIL NFR BLD AUTO: 2.6 % (ref 0.3–6.2)
ERYTHROCYTE [DISTWIDTH] IN BLOOD BY AUTOMATED COUNT: 15.3 % (ref 12.3–15.4)
GLUCOSE SERPL-MCNC: 90 MG/DL (ref 65–99)
HCT VFR BLD AUTO: 32.6 % (ref 37.5–51)
HGB BLD-MCNC: 10.9 G/DL (ref 13–17.7)
IMM GRANULOCYTES # BLD AUTO: 0.02 10*3/MM3 (ref 0–0.05)
IMM GRANULOCYTES NFR BLD AUTO: 0.3 % (ref 0–0.5)
LYMPHOCYTES # BLD AUTO: 2.03 10*3/MM3 (ref 0.7–3.1)
LYMPHOCYTES NFR BLD AUTO: 35.2 % (ref 19.6–45.3)
MAGNESIUM SERPL-MCNC: 2.1 MG/DL (ref 1.6–2.6)
MCH RBC QN AUTO: 34.3 PG (ref 26.6–33)
MCHC RBC AUTO-ENTMCNC: 33.4 G/DL (ref 31.5–35.7)
MCV RBC AUTO: 102.5 FL (ref 79–97)
MONOCYTES # BLD AUTO: 0.43 10*3/MM3 (ref 0.1–0.9)
MONOCYTES NFR BLD AUTO: 7.5 % (ref 5–12)
NEUTROPHILS NFR BLD AUTO: 3.08 10*3/MM3 (ref 1.7–7)
NEUTROPHILS NFR BLD AUTO: 53.4 % (ref 42.7–76)
NRBC BLD AUTO-RTO: 0 /100 WBC (ref 0–0.2)
PHOSPHATE SERPL-MCNC: 3.1 MG/DL (ref 2.5–4.5)
PLATELET # BLD AUTO: 331 10*3/MM3 (ref 140–450)
PMV BLD AUTO: 8.7 FL (ref 6–12)
POTASSIUM SERPL-SCNC: 3.7 MMOL/L (ref 3.5–5.2)
PROT SERPL-MCNC: 6.9 G/DL (ref 6–8.5)
QT INTERVAL: 419 MS
QTC INTERVAL: 483 MS
RBC # BLD AUTO: 3.18 10*6/MM3 (ref 4.14–5.8)
SODIUM SERPL-SCNC: 127 MMOL/L (ref 136–145)
WBC NRBC COR # BLD: 5.77 10*3/MM3 (ref 3.4–10.8)

## 2023-08-29 PROCEDURE — 84100 ASSAY OF PHOSPHORUS: CPT | Performed by: FAMILY MEDICINE

## 2023-08-29 PROCEDURE — 25010000002 HEPARIN (PORCINE) PER 1000 UNITS: Performed by: INTERNAL MEDICINE

## 2023-08-29 PROCEDURE — 85025 COMPLETE CBC W/AUTO DIFF WBC: CPT | Performed by: FAMILY MEDICINE

## 2023-08-29 PROCEDURE — 83735 ASSAY OF MAGNESIUM: CPT | Performed by: FAMILY MEDICINE

## 2023-08-29 PROCEDURE — 25010000002 ALBUMIN HUMAN 25% PER 50 ML: Performed by: FAMILY MEDICINE

## 2023-08-29 PROCEDURE — 80076 HEPATIC FUNCTION PANEL: CPT | Performed by: FAMILY MEDICINE

## 2023-08-29 PROCEDURE — 99233 SBSQ HOSP IP/OBS HIGH 50: CPT | Performed by: INTERNAL MEDICINE

## 2023-08-29 PROCEDURE — P9047 ALBUMIN (HUMAN), 25%, 50ML: HCPCS | Performed by: FAMILY MEDICINE

## 2023-08-29 PROCEDURE — 80048 BASIC METABOLIC PNL TOTAL CA: CPT | Performed by: FAMILY MEDICINE

## 2023-08-29 RX ORDER — ALBUMIN (HUMAN) 12.5 G/50ML
25 SOLUTION INTRAVENOUS ONCE
Status: COMPLETED | OUTPATIENT
Start: 2023-08-30 | End: 2023-08-30

## 2023-08-29 RX ORDER — MIDODRINE HYDROCHLORIDE 10 MG/1
10 TABLET ORAL EVERY 8 HOURS
Status: DISCONTINUED | OUTPATIENT
Start: 2023-08-29 | End: 2023-08-31 | Stop reason: HOSPADM

## 2023-08-29 RX ORDER — METOPROLOL SUCCINATE 25 MG/1
12.5 TABLET, EXTENDED RELEASE ORAL
Status: DISCONTINUED | OUTPATIENT
Start: 2023-08-30 | End: 2023-08-31 | Stop reason: HOSPADM

## 2023-08-29 RX ADMIN — METOPROLOL SUCCINATE 25 MG: 25 TABLET, EXTENDED RELEASE ORAL at 09:24

## 2023-08-29 RX ADMIN — ALPRAZOLAM 0.5 MG: 0.25 TABLET ORAL at 09:24

## 2023-08-29 RX ADMIN — HYDROCODONE BITARTRATE AND ACETAMINOPHEN 1 TABLET: 10; 325 TABLET ORAL at 20:24

## 2023-08-29 RX ADMIN — DOXYCYCLINE 100 MG: 100 CAPSULE ORAL at 09:09

## 2023-08-29 RX ADMIN — SPIRONOLACTONE 25 MG: 25 TABLET ORAL at 09:47

## 2023-08-29 RX ADMIN — Medication 10 ML: at 09:10

## 2023-08-29 RX ADMIN — MIDODRINE HYDROCHLORIDE 10 MG: 10 TABLET ORAL at 13:07

## 2023-08-29 RX ADMIN — Medication 10 ML: at 20:25

## 2023-08-29 RX ADMIN — Medication 100 MG: at 09:47

## 2023-08-29 RX ADMIN — LEVOTHYROXINE SODIUM 100 MCG: 0.1 TABLET ORAL at 06:53

## 2023-08-29 RX ADMIN — WHITE PETROLATUM 1 APPLICATION: 1.75 OINTMENT TOPICAL at 10:02

## 2023-08-29 RX ADMIN — FUROSEMIDE 20 MG: 20 TABLET ORAL at 09:09

## 2023-08-29 RX ADMIN — ALPRAZOLAM 0.5 MG: 0.25 TABLET ORAL at 20:25

## 2023-08-29 RX ADMIN — ALBUMIN HUMAN 25 G: 0.25 SOLUTION INTRAVENOUS at 09:47

## 2023-08-29 RX ADMIN — DOXYCYCLINE 100 MG: 100 CAPSULE ORAL at 20:24

## 2023-08-29 RX ADMIN — HEPARIN SODIUM 5000 UNITS: 5000 INJECTION INTRAVENOUS; SUBCUTANEOUS at 21:02

## 2023-08-29 RX ADMIN — EMPAGLIFLOZIN 10 MG: 10 TABLET, FILM COATED ORAL at 09:10

## 2023-08-29 RX ADMIN — SACUBITRIL AND VALSARTAN 0.5 TABLET: 24; 26 TABLET, FILM COATED ORAL at 09:09

## 2023-08-29 RX ADMIN — HEPARIN SODIUM 5000 UNITS: 5000 INJECTION INTRAVENOUS; SUBCUTANEOUS at 15:00

## 2023-08-29 RX ADMIN — HEPARIN SODIUM 5000 UNITS: 5000 INJECTION INTRAVENOUS; SUBCUTANEOUS at 06:53

## 2023-08-29 RX ADMIN — MIDODRINE HYDROCHLORIDE 10 MG: 10 TABLET ORAL at 20:25

## 2023-08-29 NOTE — PLAN OF CARE
Goal Outcome Evaluation:  Plan of Care Reviewed With: patient        Progress: improving  Outcome Evaluation: Pt remains on room air. Pt legs were washed and wrapped per Wound care nurse. Pt continues to show s/s of anxiety. PRN xanax given once this shift.

## 2023-08-29 NOTE — PROGRESS NOTES
[facility]   Hospitalist Progress Note  Date: 2023  Patient Name: Greyson Schofield  : 1970  MRN: [id number]  Date of admission: 2023      Subjective   Subjective     Chief complaint: Shortness of breath, lower extremity edema    Summary:  53-year-old male with history of TBI, hospitalized on 2023 with shortness of breath and lower extremity edema, found to have elevated ALT, likely NSTEMI, type II MI from cardiac strain from CHF exacerbation, anasarca, with heart failure newly diagnosed with a EF 10 to 15%, severely dilated cardiomyopathy likely related to alcohol use, cardiology consulted, status post cardiac cath, normal coronaries.  Paracentesis pending.  TSH high, total T4 low literally nonexistent, started on levothyroxine 100 mcg daily, will need repeat TSH and total T4 in 6 weeks, started antibiotics preemptively for skin breakdown behind proximal calfs, arranged LifeVest  IR drain 1.8 L paracentesis.  No SBP    Interval follow-up: Seen and examined, blood pressure dropped to systolic 80 and has been running low.  Asymptomatic without any dizziness.  No acute distress, no acute major overnight events, no chest pain or palpitations, paracentesis today, has LifeVest delivered in room .    .  Discussed underlying liver cirrhosis, all questions answered.  Will need GI referral as outpatient for monitoring.  Lower extremity edema improving.  Shortness of breath better.    Review of systems:  Patient wants anxiety medicine at the time of discharge as he will be moving out of his cousins place to his son creating conflict.    All systems reviewed and negative except generalized fatigue, weakness and anxiety    Objective   Objective     Vitals:   Temp:  [97.3 °F (36.3 °C)-97.8 °F (36.6 °C)] 97.3 °F (36.3 °C)  Heart Rate:  [74-83] 75  Resp:  [18] 18  BP: (81-97)/(55-65) 95/65  Physical Exam   Constitutional: Awake alert oriented no acute distress  Respiratory: Air entry bilaterally, no  crackles or wheezing  GI: Abdomen distended and taut, nontender  Cardiovascular: RRR, no murmur  Extremities: Bilateral lower extremity wrapped.  Lower extremity edema noted, anasarca with swelling to his thighs with some popped blistering and erythema superior aspect of the calfs bilaterally without drainage or warmth  Neuro: Alert and oriented x3, slow to respond with slow speech, cranial nerves II through XII gross intact confrontation    Result Review    Result Review:  I have personally reviewed the pertinent results from the past 24 hours to 8/29/2023 18:52 EDT and agree with these findings:  x laboratory   CBC          8/27/2023    04:45 8/28/2023    04:28 8/29/2023    05:40   CBC   WBC 6.54  6.30  5.77    RBC 3.27  3.31  3.18    Hemoglobin 11.6  11.4  10.9    Hematocrit 32.6  33.7  32.6    MCV 99.7  101.8  102.5    MCH 35.5  34.4  34.3    MCHC 35.6  33.8  33.4    RDW 15.0  15.3  15.3    Platelets 336  351  331      BMP          8/27/2023    04:45 8/28/2023    04:28 8/29/2023    05:40   BMP   BUN 10  12  14    Creatinine 1.13  1.26  1.14    Sodium 126  127  127    Potassium 3.5  3.6  3.7    Chloride 90  90  91    CO2 26.8  26.6  27.0    Calcium 8.4  8.6  8.8    LIVER FUNCTION TESTS:      Lab 08/29/23  0540 08/28/23  0428 08/27/23  0445 08/26/23  0445 08/25/23  0431   TOTAL PROTEIN 6.9 6.9 6.7 7.3 7.4   ALBUMIN 4.6 4.0 3.9 4.3 4.0   ALT (SGPT) 13 13 13 14 16   AST (SGOT) 46* 50* 47* 57* 64*   BILIRUBIN 0.5 0.4 0.4 0.5 0.5   INDIRECT BILIRUBIN 0.3 0.2 0.2 0.3 0.3   BILIRUBIN DIRECT 0.2 0.2 0.2 0.2 0.2   ALK PHOS 75 92 91 100 102         x microbiology   Blood Culture   Date Value Ref Range Status   08/22/2023 No growth at 24 hours  Preliminary     No results found for: BCIDPCR, CXREFLEX, CSFCX, CULTURETIS  No results found for: CULTURES, HSVCX, URCX  No results found for: EYECULTURE, GCCX, HSVCULTURE, LABHSV  No results found for: LEGIONELLA, MRSACX, MUMPSCX, MYCOPLASCX  No results found for: NOCARDIACX,  STOOLCX  No results found for: THROATCX, UNSTIMCULT, URINECX, CULTURE, VZVCULTUR  No results found for: VIRALCULTU, WOUNDCX    x radiology Adult Transthoracic Echo Complete W/ Cont if Necessary Per Protocol    Result Date: 8/22/2023    The left ventricular cavity is severely dilated.   Left ventricular systolic function is severely decreased. Calculated left ventricular EF = 10.3%   Left ventricular diastolic function is consistent with (grade II w/high LAP) pseudonormalization.   Mildly reduced right ventricular systolic function noted.   The left atrial cavity is severely dilated.   Estimated right ventricular systolic pressure from tricuspid regurgitation is normal (<35 mmHg).   There is a small (<1cm) pericardial effusion posteriorly.     US Abdomen Complete    Result Date: 8/22/2023  PROCEDURE: US ABDOMEN COMPLETE  COMPARISON: None  INDICATIONS: Rule out ascites  TECHNIQUE: High resolution sonographic examination of the abdomen was performed.   FINDINGS:  There is underlying ascites within all 4 quadrants of the abdomen.        1. Evidence for ascites.     DARCI RODRIGUEZ MD       Electronically Signed and Approved By: DARCI RODRIGUEZ MD on 8/22/2023 at 20:32             XR Chest 1 View    Result Date: 8/22/2023  PROCEDURE: XR CHEST 1 VW  COMPARISON: None.  INDICATIONS: shortness of breath  FINDINGS:  A single AP upright portable chest radiograph reveals mild-to-moderate cardiomegaly, mild pulmonary edema with vascular congestion, minimal, if any, pleural effusion, no pneumothorax, and an old healed distal right clavicle fracture.  Mild thoracic aortic atherosclerotic change is possible.  There may be chronic calcified granulomatous disease of the chest.         1. There is mild-to-moderate cardiomegaly.   2. Mild pulmonary edema with vascular congestion is suggested.  Mild subsegmental atelectasis may involve the lung bases.  Pneumonia cannot be excluded entirely but is thought to be less likely.  3. Please see  above comments for further detail.      Please note that portions of this note were completed with a voice recognition program.  CORINA OLVERA JR, MD       Electronically Signed and Approved By: CORINA OLVERA JR, MD on 8/22/2023 at 3:05              Duplex Venous Lower Extremity - Right CV-READ    Result Date: 8/23/2023    Normal right lower extremity venous duplex scan.       x EKG/Telemetry normal sinus rhythm rate of 80  Cardiology/Vascular   Pathology  x old records  Other:    Assessment & Plan   Assessment / Plan     Assessment/Plan:    Assessment:  Hypotension  Hyponatremia likely from CHF and diuresis  Acute decompensation of combined heart failure; new diagnosis.  EF of 15%  Likely alcoholic cardiomyopathy  Anasarca  Elevated troponin likely NSTEMI type II from cardiac strain from CHF exacerbation   Abdominal distention  Abdominal ascites likely secondary to heart failure and probably liver disease  Chronic alcohol use  Untreated hypothyroidism  Alcohol liver cirrhosis  Macrocytosis  Anemia  B12 deficiency  Anxiety disorder     Plan:  Labs and imaging reviewed  Start levothyroxine 100 mcg daily, repeat TSH and total T4 in 6 weeks, adjust accordingly as outpatient  LifeVest noted  Post paracentesis, blood pressures 80s over 60s s/p IV albumin  Switch Ativan to Xanax 0.5 mg 4 times daily as needed for anxiety  Hold Aldactone and Entresto.  Decrease metoprolol dose by 50%.  Midodrine  Continue doxycycline 100 mg twice a day for erythematous changes of his lower extremities  Continue Lasix for 20 mg daily  1500 mL oral fluid restriction continued  Trending serum sodium  Cardiology recommendations appreciated  Strict I's and O's  Daily weights  Continue on Jardiance 10 mg daily  GI referral for liver cirrhosis as outpatient  Continue cardiac diet  PT/OT  1000 mcg of B12 replacement daily IM  Rechecking chemistries to trend serum creatinine and electrolytes in a.m. patient undergoes aggressive diuresis  Follow  CBC daily  Further treatment contingent upon his hospital course  DVT prophylaxis with SCD  Discussed plan with  and RN.  Discharge on blood pressure stable    DVT prophylaxis:  Medical DVT prophylaxis orders are present.    CODE STATUS:   Level Of Support Discussed With: Patient  Code Status (Patient has no pulse and is not breathing): CPR (Attempt to Resuscitate)  Medical Interventions (Patient has pulse or is breathing): Full Support        Portions of this documentation were transcribed electronically from a voice recognition software.  I confirm all data accurately represents the service(s) I performed at today's visit.

## 2023-08-29 NOTE — SIGNIFICANT NOTE
Wound Eval / Progress Noted    KATY Rebolledo     Patient Name: Greyson Schofield  : 1970  MRN: [id number]  Today's Date: 2023                 Admit Date: 2023    Visit Dx:    ICD-10-CM ICD-9-CM   1. Acute on chronic congestive heart failure, unspecified heart failure type  I50.9 428.0   2. Difficulty walking  R26.2 719.7   3. Impaired mobility and ADLs  Z74.09 V49.89    Z78.9    4. Chest tightness  R07.89 786.59   5. Acute HFrEF (heart failure with reduced ejection fraction)  I50.21 428.21         Acute exacerbation of CHF (congestive heart failure)    Chest tightness    Acute HFrEF (heart failure with reduced ejection fraction)        Past Medical History:   Diagnosis Date    Arthritis         Past Surgical History:   Procedure Laterality Date    APPENDECTOMY           Physical Assessment:       23 1005   Skin   Skin WDL X;all   Skin Color/Characteristics honey;shiny;other (see comments)  (bilateral lower legs)   Skin Temperature warm   Skin Moisture dry   Skin Elasticity firm   Skin Integrity intact;other (see comments)  (edema, redness to BLE)         Wound Check / Follow-up:  Patient seen today for a wound check. Bilateral lower legs have shown improvement since last assessment. Redness has decreased but remains prominent to the posterior aspect of the bilateral lower legs; edema has also decreased but remains present. No open tissue or wounds noted. Cleansed bilateral legs and feet with CHG wash and applied Aquaphor. Wrapped bilateral legs and feet with gauze roll and elastic bandage for light compression. Recommend to continue current care at this time.     Impression: redness, edema    Short term goals:  regain skin integrity, skin protection, edema management, topical treatment    Selena Busby RN    2023    12:13 EDT

## 2023-08-29 NOTE — PLAN OF CARE
Goal Outcome Evaluation:  Plan of Care Reviewed With: patient        Progress: no change       No overnight concerns or events to report. Slept well throughout the night. Vitals stable at this time. Hopeful to d/c home soon.

## 2023-08-30 LAB
CYTO UR: NORMAL
LAB AP CASE REPORT: NORMAL
LAB AP CLINICAL INFORMATION: NORMAL
PATH REPORT.FINAL DX SPEC: NORMAL
PATH REPORT.GROSS SPEC: NORMAL

## 2023-08-30 PROCEDURE — P9047 ALBUMIN (HUMAN), 25%, 50ML: HCPCS | Performed by: PHYSICIAN ASSISTANT

## 2023-08-30 PROCEDURE — 25010000002 HEPARIN (PORCINE) PER 1000 UNITS: Performed by: INTERNAL MEDICINE

## 2023-08-30 PROCEDURE — 99233 SBSQ HOSP IP/OBS HIGH 50: CPT | Performed by: INTERNAL MEDICINE

## 2023-08-30 PROCEDURE — 25010000002 ALBUMIN HUMAN 25% PER 50 ML: Performed by: PHYSICIAN ASSISTANT

## 2023-08-30 RX ADMIN — Medication 100 MG: at 09:58

## 2023-08-30 RX ADMIN — WHITE PETROLATUM 1 APPLICATION: 1.75 OINTMENT TOPICAL at 09:58

## 2023-08-30 RX ADMIN — ALPRAZOLAM 0.5 MG: 0.25 TABLET ORAL at 10:48

## 2023-08-30 RX ADMIN — SODIUM CHLORIDE 250 ML: 9 INJECTION, SOLUTION INTRAVENOUS at 13:53

## 2023-08-30 RX ADMIN — HEPARIN SODIUM 5000 UNITS: 5000 INJECTION INTRAVENOUS; SUBCUTANEOUS at 22:17

## 2023-08-30 RX ADMIN — Medication 10 ML: at 20:10

## 2023-08-30 RX ADMIN — HEPARIN SODIUM 5000 UNITS: 5000 INJECTION INTRAVENOUS; SUBCUTANEOUS at 13:53

## 2023-08-30 RX ADMIN — DOXYCYCLINE 100 MG: 100 CAPSULE ORAL at 20:09

## 2023-08-30 RX ADMIN — MIDODRINE HYDROCHLORIDE 10 MG: 10 TABLET ORAL at 13:53

## 2023-08-30 RX ADMIN — HEPARIN SODIUM 5000 UNITS: 5000 INJECTION INTRAVENOUS; SUBCUTANEOUS at 06:13

## 2023-08-30 RX ADMIN — MIDODRINE HYDROCHLORIDE 10 MG: 10 TABLET ORAL at 06:13

## 2023-08-30 RX ADMIN — MIDODRINE HYDROCHLORIDE 10 MG: 10 TABLET ORAL at 20:09

## 2023-08-30 RX ADMIN — ALBUMIN HUMAN 25 G: 0.25 SOLUTION INTRAVENOUS at 00:17

## 2023-08-30 RX ADMIN — LEVOTHYROXINE SODIUM 100 MCG: 0.1 TABLET ORAL at 06:13

## 2023-08-30 RX ADMIN — ALPRAZOLAM 0.5 MG: 0.25 TABLET ORAL at 16:42

## 2023-08-30 RX ADMIN — Medication 10 ML: at 09:59

## 2023-08-30 RX ADMIN — EMPAGLIFLOZIN 10 MG: 10 TABLET, FILM COATED ORAL at 09:58

## 2023-08-30 RX ADMIN — DOXYCYCLINE 100 MG: 100 CAPSULE ORAL at 09:58

## 2023-08-30 RX ADMIN — ALPRAZOLAM 0.5 MG: 0.25 TABLET ORAL at 22:16

## 2023-08-30 RX ADMIN — FUROSEMIDE 20 MG: 20 TABLET ORAL at 09:58

## 2023-08-30 RX ADMIN — HYDROCODONE BITARTRATE AND ACETAMINOPHEN 1 TABLET: 10; 325 TABLET ORAL at 22:16

## 2023-08-30 NOTE — PROGRESS NOTES
[facility]     Cardiology Progress Note    Patient Name: Greyson Schofield  : 1970  MRN: [id number]  Primary Care Physician:  Jen Deutsch APRN  Date of admission: 2023    Subjective   Subjective     I was asked to see the patient again for soft blood pressure.  He underwent paracentesis on Monday with removal of 1.8 L.  He has been having soft blood pressure and his heart failure medications were held.  He was started on midodrine.  He feels better cardiac wise without any chest discomfort or significant dyspnea.  He denies dizziness or lightheadedness.    Review of Systems   All systems were reviewed and negative except as above    Objective   Objective     Vitals:   Temp:  [97.2 °F (36.2 °C)-98.1 °F (36.7 °C)] 97.2 °F (36.2 °C)  Heart Rate:  [74-93] 79  Resp:  [18-20] 20  BP: (70-94)/(46-66) 88/66  Physical Exam               Constitutional: Awake, alert, No acute distress               Eyes: PERRLA, sclerae anicteric, no conjunctival injection              HENT: NCAT, mucous membranes moist              Neck: Supple, no thyromegaly, no lymphadenopathy, trachea midline              Respiratory: Clear to auscultation bilaterally, nonlabored respirations               Cardiovascular: RRR, no murmurs, rubs, or gallops, palpable pedal pulses bilaterally              Gastrointestinal: Positive bowel sounds, soft, nontender, mildly distended              Musculoskeletal: trace bilateral leg edema, no clubbing or cyanosis to extremities              Psychiatric: Appropriate affect, cooperative              Neurologic: Oriented x 3, strength symmetric in all extremities, Cranial Nerves grossly intact to confrontation, speech clear              Skin: No rashes     Scheduled Meds:doxycycline, 100 mg, Oral, Q12H  empagliflozin, 10 mg, Oral, Daily  furosemide, 20 mg, Oral, Daily  heparin (porcine), 5,000 Units, Subcutaneous, Q8H  levothyroxine, 100 mcg, Oral, Q AM  metoprolol succinate XL, 12.5 mg,  Oral, Q24H  midodrine, 10 mg, Oral, Q8H  mineral oil-hydrophilic petrolatum, 1 application , Topical, Daily  sodium chloride, 10 mL, Intravenous, Q12H  thiamine, 100 mg, Oral, Daily      Continuous Infusions:        Result Review    Result Review:  I have personally reviewed the results from the time of this admission to 8/30/2023 16:14 EDT and agree with these findings:  [x]  Laboratory  []  Microbiology  [x]  Radiology  [x]  EKG/Telemetry   [x]  Cardiology/Vascular   []  Pathology  []  Old records  []  Other:  Most notable findings include:     CBC          8/27/2023    04:45 8/28/2023    04:28 8/29/2023    05:40   CBC   WBC 6.54  6.30  5.77    RBC 3.27  3.31  3.18    Hemoglobin 11.6  11.4  10.9    Hematocrit 32.6  33.7  32.6    MCV 99.7  101.8  102.5    MCH 35.5  34.4  34.3    MCHC 35.6  33.8  33.4    RDW 15.0  15.3  15.3    Platelets 336  351  331      CMP          8/27/2023    04:45 8/28/2023    04:28 8/29/2023    05:40   CMP   Glucose 114  93  90    BUN 10  12  14    Creatinine 1.13  1.26  1.14    EGFR 78.7  69.1  77.9    Sodium 126  127  127    Potassium 3.5  3.6  3.7    Chloride 90  90  91    Calcium 8.4  8.6  8.8    Total Protein 6.7  6.9  6.9    Albumin 3.9  4.0  4.6    Total Bilirubin 0.4  0.4  0.5    Alkaline Phosphatase 91  92  75    AST (SGOT) 47  50  46    ALT (SGPT) 13  13  13    BUN/Creatinine Ratio 8.8  9.5  12.3    Anion Gap 9.2  10.4  9.0       CARDIAC LABS:      Lab 08/28/23  1859 08/28/23  1502 08/28/23  0428 08/25/23  1052   HSTROP T 125* 120*  --   --    PROTIME  --   --  13.9 13.5   INR  --   --  1.06 1.02        Assessment & Plan   Assessment / Plan     Brief Patient Summary:  Greyson Schofield is a 53 y.o. male with:     Anasarca secondary to acute systolic congestive heart failure exacerbation, newly diagnosed, LVEF around 10 to 15% by echo.  Severe dilated cardiomyopathy, more likely related to alcohol abuse.  Mildly elevated troponin without angina or acute ischemic ST-T changes, more like  related to CHF exacerbation.  Hyponatremia, Na 125  History of traumatic brain injury 7 years ago.  Alcohol abuse.   Active smoking.  Ascites     Plan:   Patient with labile blood pressure, likely related to dehydration/overdiuresis.  He will be given normal saline 500 bolus over 2 hours.  Continue to hold blood pressure medications until blood pressure stabilizes.  Okay to continue midodrine for now but will aim to stop it within the near future given his severely reduced LV systolic function.  Lasix will be held for now.  May be resumed when blood pressure stabilizes.  Maintain potassium around 4.0 magnesium around 2.0.  Monitor kidney function.    Discussed with Dr. Mclean and family at bedside.    CODE STATUS:   Level Of Support Discussed With: Patient  Code Status (Patient has no pulse and is not breathing): CPR (Attempt to Resuscitate)  Medical Interventions (Patient has pulse or is breathing): Full Support      Electronically signed by Jomar Lynch MD, 08/30/23, 4:14 PM EDT.

## 2023-08-30 NOTE — SIGNIFICANT NOTE
08/30/23 1600   Spiritual Care   Use of Spiritual Resources non-Faith use of spiritual care   Spiritual Care Source  initiative   Spiritual Care Follow-Up follow-up planned regularly for general support   Response to Spiritual Care visit helpful   Spiritual Care Interventions supportive conversation provided   Spiritual Care Visit Type other (see comments)  (Family Support)   Spiritual Care Request family support;hospitality support   Receptivity to Spiritual Care other (see comments)  (Support for family outside of room)

## 2023-08-30 NOTE — PROGRESS NOTES
[facility]   Hospitalist Progress Note  Date: 2023  Patient Name: Greyson Schofield  : 1970  MRN: [id number]  Date of admission: 2023      Subjective   Subjective     Chief complaint: Shortness of breath, lower extremity edema    Summary:  53-year-old male with history of TBI, hospitalized on 2023 with shortness of breath and lower extremity edema, found to have elevated ALT, likely NSTEMI, type II MI from cardiac strain from CHF exacerbation, anasarca, with heart failure newly diagnosed with a EF 10 to 15%, severely dilated cardiomyopathy likely related to alcohol use, cardiology consulted, status post cardiac cath, normal coronaries.  Paracentesis pending.  TSH high, total T4 low literally nonexistent, started on levothyroxine 100 mcg daily, will need repeat TSH and total T4 in 6 weeks, started antibiotics preemptively for skin breakdown behind proximal calfs, arranged LifeVest  IR drain 1.8 L paracentesis.  No SBP.Will need GI referral as outpatient for monitoring.    Interval follow-up: Seen and examined,   Pressure remains low.  Metoprolol held reevaluated by cardiology due to low blood pressure.   asymptomatic without any dizziness.  No acute distress, no acute major overnight events, no chest pain or palpitations, paracentesis today, has LifeVest delivered in room .    Complaining of drainage from paracentesis site right lateral abdominal  .  Discussed underlying liver cirrhosis, all questions answered.  Will need GI referral as outpatient for monitoring.  Lower extremity edema improving.  Shortness of breath better.      Review of systems:  Patient wants anxiety medicine at the time of discharge as he will be moving out of his cousins place to his son creating conflict.    All systems reviewed and negative except generalized fatigue, weakness and anxiety    Objective   Objective     Vitals:   Temp:  [97.2 °F (36.2 °C)-98.1 °F (36.7 °C)] 97.2 °F (36.2 °C)  Heart Rate:  [74-93]  79  Resp:  [18-20] 20  BP: (70-94)/(46-66) 88/66  Physical Exam   Constitutional: Awake alert oriented no acute distress  Respiratory: Air entry bilaterally, no crackles or wheezing  GI: Abdomen distended and taut, nontender  Cardiovascular: RRR, no murmur  Extremities: Bilateral lower extremity wrapped.  Lower extremity edema noted, anasarca with swelling to his thighs with some popped blistering and erythema superior aspect of the calfs bilaterally without drainage or warmth  Neuro: Alert and oriented x3, slow to respond with slow speech, cranial nerves II through XII gross intact confrontation    Result Review    Result Review:  I have personally reviewed the pertinent results from the past 24 hours to 8/30/2023 17:57 EDT and agree with these findings:  x laboratory   CBC          8/27/2023    04:45 8/28/2023    04:28 8/29/2023    05:40   CBC   WBC 6.54  6.30  5.77    RBC 3.27  3.31  3.18    Hemoglobin 11.6  11.4  10.9    Hematocrit 32.6  33.7  32.6    MCV 99.7  101.8  102.5    MCH 35.5  34.4  34.3    MCHC 35.6  33.8  33.4    RDW 15.0  15.3  15.3    Platelets 336  351  331      BMP          8/27/2023    04:45 8/28/2023    04:28 8/29/2023    05:40   BMP   BUN 10  12  14    Creatinine 1.13  1.26  1.14    Sodium 126  127  127    Potassium 3.5  3.6  3.7    Chloride 90  90  91    CO2 26.8  26.6  27.0    Calcium 8.4  8.6  8.8    LIVER FUNCTION TESTS:      Lab 08/29/23  0540 08/28/23  0428 08/27/23  0445 08/26/23  0445 08/25/23  0431   TOTAL PROTEIN 6.9 6.9 6.7 7.3 7.4   ALBUMIN 4.6 4.0 3.9 4.3 4.0   ALT (SGPT) 13 13 13 14 16   AST (SGOT) 46* 50* 47* 57* 64*   BILIRUBIN 0.5 0.4 0.4 0.5 0.5   INDIRECT BILIRUBIN 0.3 0.2 0.2 0.3 0.3   BILIRUBIN DIRECT 0.2 0.2 0.2 0.2 0.2   ALK PHOS 75 92 91 100 102         x microbiology   Blood Culture   Date Value Ref Range Status   08/22/2023 No growth at 24 hours  Preliminary     No results found for: BCIDPCR, CXREFLEX, CSFCX, CULTURETIS  No results found for: CULTURES, HSVCX, URCX  No  results found for: EYECULTURE, GCCX, HSVCULTURE, LABHSV  No results found for: LEGIONELLA, MRSACX, MUMPSCX, MYCOPLASCX  No results found for: NOCARDIACX, STOOLCX  No results found for: THROATCX, UNSTIMCULT, URINECX, CULTURE, VZVCULTUR  No results found for: VIRALCULTU, WOUNDCX    x radiology Adult Transthoracic Echo Complete W/ Cont if Necessary Per Protocol    Result Date: 8/22/2023    The left ventricular cavity is severely dilated.   Left ventricular systolic function is severely decreased. Calculated left ventricular EF = 10.3%   Left ventricular diastolic function is consistent with (grade II w/high LAP) pseudonormalization.   Mildly reduced right ventricular systolic function noted.   The left atrial cavity is severely dilated.   Estimated right ventricular systolic pressure from tricuspid regurgitation is normal (<35 mmHg).   There is a small (<1cm) pericardial effusion posteriorly.     US Abdomen Complete    Result Date: 8/22/2023  PROCEDURE: US ABDOMEN COMPLETE  COMPARISON: None  INDICATIONS: Rule out ascites  TECHNIQUE: High resolution sonographic examination of the abdomen was performed.   FINDINGS:  There is underlying ascites within all 4 quadrants of the abdomen.        1. Evidence for ascites.     DARCI RODRIGUEZ MD       Electronically Signed and Approved By: DARCI RODRIGUEZ MD on 8/22/2023 at 20:32             XR Chest 1 View    Result Date: 8/22/2023  PROCEDURE: XR CHEST 1 VW  COMPARISON: None.  INDICATIONS: shortness of breath  FINDINGS:  A single AP upright portable chest radiograph reveals mild-to-moderate cardiomegaly, mild pulmonary edema with vascular congestion, minimal, if any, pleural effusion, no pneumothorax, and an old healed distal right clavicle fracture.  Mild thoracic aortic atherosclerotic change is possible.  There may be chronic calcified granulomatous disease of the chest.         1. There is mild-to-moderate cardiomegaly.   2. Mild pulmonary edema with vascular congestion is  suggested.  Mild subsegmental atelectasis may involve the lung bases.  Pneumonia cannot be excluded entirely but is thought to be less likely.  3. Please see above comments for further detail.      Please note that portions of this note were completed with a voice recognition program.  CORINA OLVERA JR, MD       Electronically Signed and Approved By: CORINA OLVERA JR, MD on 8/22/2023 at 3:05              Duplex Venous Lower Extremity - Right CV-READ    Result Date: 8/23/2023    Normal right lower extremity venous duplex scan.       x EKG/Telemetry normal sinus rhythm rate of 80  Cardiology/Vascular   Pathology  x old records  Other:    Assessment & Plan   Assessment / Plan     Assessment/Plan:    Assessment:  Hypotension.  Likely hypovolemic rather cardiogenic  Hyponatremia likely from CHF and diuresis  Acute decompensation of combined heart failure; new diagnosis.  EF of 15%  Likely alcoholic cardiomyopathy  Anasarca.  Improving  Elevated troponin likely NSTEMI type II from cardiac strain from CHF exacerbation   Abdominal distention  Abdominal ascites likely secondary to heart failure and probably liver disease  Chronic alcohol use  Untreated hypothyroidism  Alcohol liver cirrhosis  Macrocytosis  Anemia  B12 deficiency  Anxiety disorder     Plan:  Labs and imaging reviewed  Repeat paracentesis in the morning per mother's request  Discussed with cardiology.  500 mL IV fluid bolus  Continue levothyroxine 100 mcg daily, repeat TSH and total T4 in 6 weeks, adjust accordingly as outpatient  LifeVest noted in the room  Post paracentesis, blood pressures 80s over 60s s/p IV albumin.  Switched Ativan to Xanax 0.5 mg 4 times daily as needed for anxiety  Hold Aldactone and Entresto.  Decrease metoprolol dose by 50%.  Continue midodrine  Continue doxycycline 100 mg twice a day for erythematous changes of his lower extremities   Lasix put on hold as per cardiology  DC 1500 mL oral fluid restriction   Trending serum  sodium  Cardiology recommendations appreciated  Strict I's and O's  Daily weights  Continue on Jardiance 10 mg daily  GI referral for liver cirrhosis as outpatient  Continue cardiac diet  PT/OT  1000 mcg of B12 replacement daily IM    DVT prophylaxis with SCD  Discussed plan with  and RN.  Discharge when blood pressure stable in a day or so    DVT prophylaxis:  Medical DVT prophylaxis orders are present.    CODE STATUS:   Level Of Support Discussed With: Patient  Code Status (Patient has no pulse and is not breathing): CPR (Attempt to Resuscitate)  Medical Interventions (Patient has pulse or is breathing): Full Support        Portions of this documentation were transcribed electronically from a voice recognition software.  I confirm all data accurately represents the service(s) I performed at today's visit.

## 2023-08-30 NOTE — PLAN OF CARE
Goal Outcome Evaluation:   No acute changes this shift, Hypotension noted and MD aware. Bolus administered see MAR. PRN anxiety medication administered. Paracentesis planned for tomorrow. Continuing with plan of care, no complaints at this time.

## 2023-08-30 NOTE — PLAN OF CARE
Goal Outcome Evaluation:  Plan of Care Reviewed With: patient        Progress: improving       Alert and oriented X$. Slept well throughout the night. Episode of hypotension with a dose of albumin given per MD order. B/p increased after treatment. C/o pain to RT side of abdomen and requested prn pain medication. Effective upon reassessment. Hopeful to d/c with life vest.

## 2023-08-31 ENCOUNTER — APPOINTMENT (OUTPATIENT)
Dept: INTERVENTIONAL RADIOLOGY/VASCULAR | Facility: HOSPITAL | Age: 53
DRG: 281 | End: 2023-08-31
Payer: COMMERCIAL

## 2023-08-31 ENCOUNTER — READMISSION MANAGEMENT (OUTPATIENT)
Dept: CALL CENTER | Facility: HOSPITAL | Age: 53
End: 2023-08-31
Payer: COMMERCIAL

## 2023-08-31 VITALS
TEMPERATURE: 97.9 F | BODY MASS INDEX: 28.43 KG/M2 | SYSTOLIC BLOOD PRESSURE: 100 MMHG | HEART RATE: 88 BPM | RESPIRATION RATE: 18 BRPM | WEIGHT: 170.64 LBS | DIASTOLIC BLOOD PRESSURE: 72 MMHG | HEIGHT: 65 IN | OXYGEN SATURATION: 99 %

## 2023-08-31 PROBLEM — I50.21 ACUTE HFREF (HEART FAILURE WITH REDUCED EJECTION FRACTION): Status: RESOLVED | Noted: 2023-08-22 | Resolved: 2023-08-31

## 2023-08-31 PROBLEM — K70.31 ASCITES DUE TO ALCOHOLIC CIRRHOSIS: Status: ACTIVE | Noted: 2023-08-31

## 2023-08-31 PROBLEM — K76.6 PORTAL HYPERTENSION: Status: ACTIVE | Noted: 2023-08-31

## 2023-08-31 PROBLEM — R07.89 CHEST TIGHTNESS: Status: RESOLVED | Noted: 2023-08-22 | Resolved: 2023-08-31

## 2023-08-31 LAB
ALBUMIN SERPL-MCNC: 4.1 G/DL (ref 3.5–5.2)
ALBUMIN SERPL-MCNC: 4.4 G/DL (ref 3.5–5.2)
ANION GAP SERPL CALCULATED.3IONS-SCNC: 9.8 MMOL/L (ref 5–15)
BACTERIA FLD CULT: NORMAL
BILIRUB FLD-MCNC: 0.3 MG/DL
BUN SERPL-MCNC: 22 MG/DL (ref 6–20)
BUN/CREAT SERPL: 19 (ref 7–25)
CALCIUM SPEC-SCNC: 8.8 MG/DL (ref 8.6–10.5)
CHLORIDE SERPL-SCNC: 95 MMOL/L (ref 98–107)
CO2 SERPL-SCNC: 25.2 MMOL/L (ref 22–29)
CREAT SERPL-MCNC: 1.16 MG/DL (ref 0.76–1.27)
EGFRCR SERPLBLD CKD-EPI 2021: 76.3 ML/MIN/1.73
GLUCOSE SERPL-MCNC: 81 MG/DL (ref 65–99)
GRAM STN SPEC: NORMAL
PHOSPHATE SERPL-MCNC: 4.5 MG/DL (ref 2.5–4.5)
POTASSIUM SERPL-SCNC: 3.8 MMOL/L (ref 3.5–5.2)
SODIUM SERPL-SCNC: 130 MMOL/L (ref 136–145)

## 2023-08-31 PROCEDURE — 0W9G3ZZ DRAINAGE OF PERITONEAL CAVITY, PERCUTANEOUS APPROACH: ICD-10-PCS | Performed by: RADIOLOGY

## 2023-08-31 PROCEDURE — 76942 ECHO GUIDE FOR BIOPSY: CPT

## 2023-08-31 PROCEDURE — 25010000002 ALBUMIN HUMAN 25% PER 50 ML: Performed by: INTERNAL MEDICINE

## 2023-08-31 PROCEDURE — 80069 RENAL FUNCTION PANEL: CPT | Performed by: INTERNAL MEDICINE

## 2023-08-31 PROCEDURE — 82040 ASSAY OF SERUM ALBUMIN: CPT

## 2023-08-31 PROCEDURE — 25010000002 HEPARIN (PORCINE) PER 1000 UNITS: Performed by: INTERNAL MEDICINE

## 2023-08-31 PROCEDURE — 99239 HOSP IP/OBS DSCHRG MGMT >30: CPT | Performed by: INTERNAL MEDICINE

## 2023-08-31 PROCEDURE — P9047 ALBUMIN (HUMAN), 25%, 50ML: HCPCS | Performed by: INTERNAL MEDICINE

## 2023-08-31 PROCEDURE — C1729 CATH, DRAINAGE: HCPCS

## 2023-08-31 RX ORDER — LIDOCAINE HYDROCHLORIDE 20 MG/ML
20 INJECTION, SOLUTION INFILTRATION; PERINEURAL ONCE
Status: COMPLETED | OUTPATIENT
Start: 2023-08-31 | End: 2023-08-31

## 2023-08-31 RX ORDER — HYDROXYZINE PAMOATE 25 MG/1
25 CAPSULE ORAL 3 TIMES DAILY PRN
Qty: 21 CAPSULE | Refills: 0 | Status: SHIPPED | OUTPATIENT
Start: 2023-08-31 | End: 2023-09-10

## 2023-08-31 RX ORDER — DAPAGLIFLOZIN 10 MG/1
10 TABLET, FILM COATED ORAL DAILY
Qty: 30 TABLET | Refills: 0 | Status: SHIPPED | OUTPATIENT
Start: 2023-08-31 | End: 2023-09-30

## 2023-08-31 RX ORDER — THIAMINE MONONITRATE (VIT B1) 100 MG
100 TABLET ORAL DAILY
Qty: 30 TABLET | Refills: 0 | Status: SHIPPED | OUTPATIENT
Start: 2023-09-01 | End: 2023-10-01

## 2023-08-31 RX ORDER — MULTIPLE VITAMINS W/ MINERALS TAB 9MG-400MCG
1 TAB ORAL DAILY
Qty: 30 TABLET | Refills: 0 | Status: SHIPPED | OUTPATIENT
Start: 2023-08-31 | End: 2023-09-30

## 2023-08-31 RX ORDER — METOPROLOL SUCCINATE 25 MG/1
12.5 TABLET, EXTENDED RELEASE ORAL
Qty: 15 TABLET | Refills: 0 | Status: SHIPPED | OUTPATIENT
Start: 2023-09-01 | End: 2023-09-05 | Stop reason: SDUPTHER

## 2023-08-31 RX ORDER — POTASSIUM CHLORIDE 750 MG/1
10 CAPSULE, EXTENDED RELEASE ORAL ONCE
Status: COMPLETED | OUTPATIENT
Start: 2023-08-31 | End: 2023-08-31

## 2023-08-31 RX ORDER — SPIRONOLACTONE 25 MG/1
25 TABLET ORAL DAILY
Qty: 30 TABLET | Refills: 0 | Status: SHIPPED | OUTPATIENT
Start: 2023-08-31 | End: 2023-09-30

## 2023-08-31 RX ORDER — LEVOTHYROXINE SODIUM 0.1 MG/1
100 TABLET ORAL
Qty: 30 TABLET | Refills: 0 | Status: SHIPPED | OUTPATIENT
Start: 2023-09-01 | End: 2023-10-01

## 2023-08-31 RX ORDER — ALBUMIN (HUMAN) 12.5 G/50ML
25 SOLUTION INTRAVENOUS ONCE
Status: COMPLETED | OUTPATIENT
Start: 2023-08-31 | End: 2023-08-31

## 2023-08-31 RX ORDER — SPIRONOLACTONE 25 MG/1
25 TABLET ORAL DAILY
Status: DISCONTINUED | OUTPATIENT
Start: 2023-08-31 | End: 2023-08-31 | Stop reason: HOSPADM

## 2023-08-31 RX ADMIN — SODIUM BICARBONATE 1 MEQ: 84 INJECTION INTRAVENOUS at 13:35

## 2023-08-31 RX ADMIN — HEPARIN SODIUM 5000 UNITS: 5000 INJECTION INTRAVENOUS; SUBCUTANEOUS at 05:06

## 2023-08-31 RX ADMIN — Medication 10 ML: at 09:37

## 2023-08-31 RX ADMIN — METOPROLOL SUCCINATE 12.5 MG: 25 TABLET, EXTENDED RELEASE ORAL at 15:27

## 2023-08-31 RX ADMIN — LIDOCAINE HYDROCHLORIDE 20 ML: 20 INJECTION, SOLUTION INFILTRATION; PERINEURAL at 13:35

## 2023-08-31 RX ADMIN — LEVOTHYROXINE SODIUM 100 MCG: 0.1 TABLET ORAL at 05:07

## 2023-08-31 RX ADMIN — ALPRAZOLAM 0.5 MG: 0.25 TABLET ORAL at 05:07

## 2023-08-31 RX ADMIN — Medication 100 MG: at 09:37

## 2023-08-31 RX ADMIN — WHITE PETROLATUM 1 APPLICATION: 1.75 OINTMENT TOPICAL at 09:37

## 2023-08-31 RX ADMIN — POTASSIUM CHLORIDE 10 MEQ: 10 CAPSULE, COATED, EXTENDED RELEASE ORAL at 15:26

## 2023-08-31 RX ADMIN — METOPROLOL SUCCINATE 12.5 MG: 25 TABLET, EXTENDED RELEASE ORAL at 09:37

## 2023-08-31 RX ADMIN — SODIUM CHLORIDE 250 ML: 9 INJECTION, SOLUTION INTRAVENOUS at 00:50

## 2023-08-31 RX ADMIN — SPIRONOLACTONE 25 MG: 25 TABLET ORAL at 11:22

## 2023-08-31 RX ADMIN — ALPRAZOLAM 0.5 MG: 0.25 TABLET ORAL at 17:44

## 2023-08-31 RX ADMIN — HEPARIN SODIUM 5000 UNITS: 5000 INJECTION INTRAVENOUS; SUBCUTANEOUS at 15:28

## 2023-08-31 RX ADMIN — DOXYCYCLINE 100 MG: 100 CAPSULE ORAL at 09:09

## 2023-08-31 RX ADMIN — MIDODRINE HYDROCHLORIDE 10 MG: 10 TABLET ORAL at 16:06

## 2023-08-31 RX ADMIN — EMPAGLIFLOZIN 10 MG: 10 TABLET, FILM COATED ORAL at 09:08

## 2023-08-31 RX ADMIN — ALBUMIN HUMAN 25 G: 0.25 SOLUTION INTRAVENOUS at 15:59

## 2023-08-31 RX ADMIN — MIDODRINE HYDROCHLORIDE 10 MG: 10 TABLET ORAL at 05:07

## 2023-08-31 NOTE — DISCHARGE SUMMARY
[facility]        HOSPITALIST  DISCHARGE SUMMARY    Patient Name: Greyson Schofield  : 1970  MRN: [id number]    Date of Admission: 2023  Date of Discharge:  2023  Primary Care Physician: Bertha Doe DO    Consults       Date and Time Order Name Status Description    2023 11:13 AM Inpatient Cardiology Consult      2023 10:43 AM Inpatient Cardiology Consult      2023  5:32 AM Inpatient Hospitalist Consult              Active and Resolved Hospital Problems:  Active Hospital Problems    Diagnosis POA    **Acute exacerbation of CHF (congestive heart failure) [I50.9] Yes    Portal hypertension [K76.6] Yes    Ascites due to alcoholic cirrhosis [K70.31] Yes      Resolved Hospital Problems    Diagnosis POA    Chest tightness [R07.89] Yes    Acute HFrEF (heart failure with reduced ejection fraction) [I50.21] Yes     Hypotension.  Likely hypovolemic rather cardiogenic  Hyponatremia likely from CHF and diuresis  Acute decompensation of combined heart failure; new diagnosis.  EF of 15%  Likely alcoholic cardiomyopathy  Anasarca.  Improving  Elevated troponin likely NSTEMI type II from cardiac strain from CHF exacerbation   Recurrent ascites.  S/p paracentesis x2.  No SBP  Chronic alcohol use  Untreated hypothyroidism.  Supplemented  Alcoholic  liver cirrhosis  Macrocytosis  Anemia  B12 deficiency  Anxiety disorder  Hospital Course     Hospital Course:  Greyson Schofield is a 53 y.o. male  with history of TBI, hospitalized on 2023 with shortness of breath and lower extremity edema, found to have elevated ALT, likely NSTEMI, type II MI from cardiac strain from CHF exacerbation, anasarca, with heart failure newly diagnosed with a EF 10 to 15%, severely dilated cardiomyopathy likely related to alcohol use, cardiology consulted, status post cardiac cath, normal coronaries.  Paracentesis pending.  TSH high, total T4 low literally nonexistent, started on levothyroxine 100 mcg daily,  will need repeat TSH and total T4 in 6 weeks, started antibiotics preemptively for skin breakdown behind proximal calfs, arranged LifeVest  IR drain 1.8 L paracentesis.  No SBP.Will need GI referral as outpatient for monitoring.  Repeat paracentesis with 1.4 L on August 31.  For low blood pressure patient reevaluated by cardiology.  Patient given IV fluid bolus as hypotension was thought to be due to hypovolemia.  His seizure medications were put on hold.     Interval follow-up: Seen and examined,   Blood pressure better today.  Patient was given fluid bolus last night again  Hypotension is asymptomatic without any dizziness.  No acute distress, no acute major overnight events, no chest pain or palpitations, for paracentesis today,   has LifeVest delivered in room .  Patient has finished his course of doxycycline for lower extremity.  Complaining of drainage from previous paracentesis site right lateral abdominal  .  Discussed underlying liver cirrhosis, all questions answered.  Will need GI referral as outpatient for monitoring.  Lower extremity edema improving.  Shortness of breath better.    Temporary disability form filled up until September 14 and then he will be seen by his PCP.      DISCHARGE Follow Up Recommendations for labs and diagnostics: Follow-up with PCP, GI and heart failure clinic.  PCP to have renal panel checked in 2 to 4 days.  Monitor blood pressure and report to PCP.  Due to low blood pressure GDMT for CHF has not been started.      Day of Discharge     Vital Signs:  Temp:  [97.3 °F (36.3 °C)-98.1 °F (36.7 °C)] 97.3 °F (36.3 °C)  Heart Rate:  [73-83] 83  Resp:  [18-20] 18  BP: ()/(48-95) 99/68    Physical Exam:     Constitutional: Awake alert oriented no acute distress  Respiratory: Air entry bilaterally, no crackles or wheezing  GI: Abdomen distended and taut, nontender  Cardiovascular: RRR, no murmur  Extremities: Bilateral lower extremity wrapped.  Lower extremity edema noted, anasarca  with swelling to his thighs with some popped blistering and erythema superior aspect of the calfs bilaterally without drainage or warmth  Neuro: Alert and oriented x3, slow to respond with slow speech, cranial nerves II through XII gross intact confrontation  Discharge Details        Discharge Medications        New Medications        Instructions Start Date   Farxiga 10 MG tablet  Generic drug: dapagliflozin Propanediol   10 mg, Oral, Daily      hydrOXYzine pamoate 25 MG capsule  Commonly known as: Vistaril   25 mg, Oral, 3 Times Daily PRN      levothyroxine 100 MCG tablet  Commonly known as: SYNTHROID, LEVOTHROID   100 mcg, Oral, Every Early Morning   Start Date: September 1, 2023     metoprolol succinate XL 25 MG 24 hr tablet  Commonly known as: TOPROL-XL   12.5 mg, Oral, Every 24 Hours Scheduled   Start Date: September 1, 2023     multivitamin with minerals tablet tablet   1 tablet, Oral, Daily      spironolactone 25 MG tablet  Commonly known as: ALDACTONE   25 mg, Oral, Daily      thiamine 100 MG tablet  Commonly known as: VITAMIN B1   100 mg, Oral, Daily   Start Date: September 1, 2023            Stop These Medications      Caffeine-Magnesium Salicylate .5 MG tablet              No Known Allergies    Discharge Disposition:  Home or Self Care.  In private vehicle    Diet:  Diet Instructions       Diet: Cardiac Diets, Fluid Restriction (240 mL/tray) Diets; Low Sodium (2g); Regular Texture (IDDSI 7); Thin (IDDSI 0); 2000 mL/day      Discharge Diet:  Cardiac Diets  Fluid Restriction (240 mL/tray) Diets       Cardiac Diet: Low Sodium (2g)    Texture: Regular Texture (IDDSI 7)    Fluid Consistency: Thin (IDDSI 0)    Fluid Restriction Diet (240 mL/tray): 2000 mL/day            Discharge Activity:   Activity Instructions       Gradually Increase Activity Until at Pre-Hospitalization Level              CODE STATUS:  Code Status and Medical Interventions:   Ordered at: 08/22/23 0698     Level Of Support Discussed  With:    Patient     Code Status (Patient has no pulse and is not breathing):    CPR (Attempt to Resuscitate)     Medical Interventions (Patient has pulse or is breathing):    Full Support         Future Appointments   Date Time Provider Department Center   9/11/2023 11:00 AM Bertha Doe DO [facility] [facility]       Additional Instructions for the Follow-ups that You Need to Schedule       Discharge Follow-up with PCP   As directed       Currently Documented PCP:    Bertha Doe DO    PCP Phone Number:    [phone]     Follow Up Details: 2 weeks        Discharge Follow-up with Specified Provider: Dr. Garcia; 2 Weeks   As directed      To: Dr. Garcia   Follow Up: 2 Weeks   Follow Up Details: Alcoholic cirrhosis        Discharge Follow-up with Specified Provider: Dr. Galeas   As directed      To: Dr. Galeas   Follow Up Details: 5 days for CHF                Pertinent  and/or Most Recent Results     PROCEDURES:   Procedure:    Left Heart Cath with possible coronary angioplasty  CPT(R) Code:  87485 - ND CATH PLMT L HRT & ARTS W/NJX & ANGIO IMG S&I  Associated Diagnoses:     * Chest tightness     * Acute HFrEF (heart failure with reduced ejection fraction)       LAB RESULTS:      Lab 08/29/23  0540 08/28/23  0428 08/27/23  0445 08/26/23  0445 08/25/23  1052 08/25/23  0432   WBC 5.77 6.30 6.54 6.64  --  5.28   HEMOGLOBIN 10.9* 11.4* 11.6* 11.8*  --  12.7*   HEMATOCRIT 32.6* 33.7* 32.6* 35.1*  --  38.3   PLATELETS 331 351 336 381  --  279   NEUTROS ABS 3.08 3.43 3.59 4.15  --  3.37   IMMATURE GRANS (ABS) 0.02 0.02 0.02 0.02  --  0.02   LYMPHS ABS 2.03 1.97 1.96 1.69  --  1.22   MONOS ABS 0.43 0.65 0.66 0.59  --  0.54   EOS ABS 0.15 0.17 0.25 0.14  --  0.08   .5* 101.8* 99.7* 102.3*  --  109.1*   PROTIME  --  13.9  --   --  13.5  --    APTT  --  37.8*  --  39.0*  --   --          Lab 08/31/23  0431 08/29/23  0540 08/28/23  0428 08/27/23  0445 08/26/23 0445 08/25/23  0431   SODIUM 130* 127* 127* 126* 129* 125*    POTASSIUM 3.8 3.7 3.6 3.5 2.9* 3.7   CHLORIDE 95* 91* 90* 90* 88* 87*   CO2 25.2 27.0 26.6 26.8 30.9* 23.9   ANION GAP 9.8 9.0 10.4 9.2 10.1 14.1   BUN 22* 14 12 10 11 10   CREATININE 1.16 1.14 1.26 1.13 1.27 1.24   EGFR 76.3 77.9 69.1 78.7 68.4 70.4   GLUCOSE 81 90 93 114* 147* 93   CALCIUM 8.8 8.8 8.6 8.4* 8.6 8.3*   MAGNESIUM  --  2.1 2.3 1.5* 1.7 1.9   PHOSPHORUS 4.5 3.1 2.6 2.8 2.4* 2.7   TSH  --   --   --  53.500*  --   --          Lab 08/31/23  0431 08/31/23  0055 08/29/23  0540 08/28/23  0428 08/27/23  0445 08/26/23  0445 08/25/23  0431   TOTAL PROTEIN  --   --  6.9 6.9 6.7 7.3 7.4   ALBUMIN 4.4 4.1 4.6 4.0 3.9 4.3 4.0   ALT (SGPT)  --   --  13 13 13 14 16   AST (SGOT)  --   --  46* 50* 47* 57* 64*   BILIRUBIN  --   --  0.5 0.4 0.4 0.5 0.5   INDIRECT BILIRUBIN  --   --  0.3 0.2 0.2 0.3 0.3   BILIRUBIN DIRECT  --   --  0.2 0.2 0.2 0.2 0.2   ALK PHOS  --   --  75 92 91 100 102         Lab 08/28/23  1859 08/28/23  1502 08/28/23  0428 08/25/23  1052   HSTROP T 125* 120*  --   --    PROTIME  --   --  13.9 13.5   INR  --   --  1.06 1.02         Lab 08/26/23  0445   CHOLESTEROL 162   LDL CHOL 73   HDL CHOL 76*   TRIGLYCERIDES 64             Lab 08/25/23  1245   PH, ARTERIAL 7.415   PCO2, ARTERIAL 41.7   PO2 .2*   O2 SATURATION ART 97.3   FIO2 36  36  36   HCO3 ART 26.1*   BASE EXCESS ART 1.4   CARBOXYHEMOGLOBIN 0.3  0.3  0.3     Brief Urine Lab Results  (Last result in the past 365 days)        Color   Clarity   Blood   Leuk Est   Nitrite   Protein   CREAT   Urine HCG        08/23/23 0413 Yellow   Clear   Negative   Negative   Negative   Negative                 Microbiology Results (last 10 days)       Procedure Component Value - Date/Time    Body Fluid Culture - Body Fluid, Peritoneum [[id number]] Collected: 08/28/23 1120    Lab Status: Final result Specimen: Body Fluid from Peritoneum Updated: 08/31/23 1117     Body Fluid Culture No growth at 3 days     Gram Stain No organisms seen    Anaerobic  Culture - Body Fluid, Peritoneum [[id number]]  (Normal) Collected: 08/28/23 1120    Lab Status: Preliminary result Specimen: Body Fluid from Peritoneum Updated: 08/31/23 1455     Anaerobic Culture No anaerobes isolated at 3 days    Blood Culture - Blood, Arm, Left [[id number]]  (Normal) Collected: 08/22/23 0152    Lab Status: Final result Specimen: Blood from Arm, Left Updated: 08/27/23 0230     Blood Culture No growth at 5 days    Blood Culture - Blood, Arm, Right [[id number]]  (Normal) Collected: 08/22/23 0151    Lab Status: Final result Specimen: Blood from Arm, Right Updated: 08/27/23 0230     Blood Culture No growth at 5 days            CT Abdomen Pelvis Without Contrast    Result Date: 8/25/2023  Impression:   1. Morphologic appearance of the liver suggesting cirrhosis. 2. There is a moderate to large degree of ascites noted within the abdomen as well as underlying liver dysfunction and a small left-sided pleural effusion.  Findings likely related to underlying cirrhosis.  A component of cardiac dysfunction also in the differential. 3. Cardiomegaly 4. Given limitations of the exam no definite acute intra-abdominal or intrapelvic abnormality otherwise appreciated.     KIM PENA MD       Electronically Signed and Approved By: KIM PENA MD on 8/25/2023 at 9:56             US Abdomen Complete    Result Date: 8/22/2023  Impression:   1. Evidence for ascites.     DARCI RODRIGUEZ MD       Electronically Signed and Approved By: DARCI RODRIGUEZ MD on 8/22/2023 at 20:32             XR Chest 1 View    Result Date: 8/28/2023  Impression:   1. Residual prominent markings left lower lobe that could relate to atelectasis with a small left pleural effusion. 2. There is some cardiomegaly. 3. Deformity of the distal right clavicle which could relate to old trauma.       DARCI RODRIGUEZ MD       Electronically Signed and Approved By: DARCI RODRIGUEZ MD on 8/28/2023 at 15:26             XR Chest 1 View    Result Date:  8/22/2023  Impression:    1. There is mild-to-moderate cardiomegaly.   2. Mild pulmonary edema with vascular congestion is suggested.  Mild subsegmental atelectasis may involve the lung bases.  Pneumonia cannot be excluded entirely but is thought to be less likely.  3. Please see above comments for further detail.      Please note that portions of this note were completed with a voice recognition program.  CORINA OLVERA JR, MD       Electronically Signed and Approved By: CORINA OLVERA JR, MD on 8/22/2023 at 3:05              US Paracentesis    Result Date: 8/31/2023  Impression:  Ultrasound-guided paracentesis was performed without complication, patient tolerated procedure with 1.4 L of clear, mendes ascitic fluid removed. The patient was instructed to obtain follow up care from the referring physician.   FIDEL HILTON       Electronically Signed and Approved By: Ulises Funez M.D. on 8/31/2023 at 15:13             US Paracentesis    Result Date: 8/28/2023  Impression:  Ultrasound-guided paracentesis was performed without complication, patient tolerated procedure with 1.9 L of clear, mendes ascitic fluid removed. A sample specimen of 150 mL was sent to the lab for further diagnostic evaluation.  The patient was instructed to obtain follow up care from the referring physician.    FIDEL HILTON       Electronically Signed and Approved By: JAELYN BAE MD on 8/28/2023 at 12:46              Results for orders placed during the hospital encounter of 08/22/23    Duplex Venous Lower Extremity - Right CV-READ    Interpretation Summary    Normal right lower extremity venous duplex scan.      Results for orders placed during the hospital encounter of 08/22/23    Duplex Venous Lower Extremity - Right CV-READ    Interpretation Summary    Normal right lower extremity venous duplex scan.      Results for orders placed during the hospital encounter of 08/22/23    Adult Transthoracic Echo Complete W/ Cont if Necessary Per  "Protocol    Interpretation Summary    The left ventricular cavity is severely dilated.    Left ventricular systolic function is severely decreased. Calculated left ventricular EF = 10.3%    Left ventricular diastolic function is consistent with (grade II w/high LAP) pseudonormalization.    Mildly reduced right ventricular systolic function noted.    The left atrial cavity is severely dilated.    Estimated right ventricular systolic pressure from tricuspid regurgitation is normal (<35 mmHg).    There is a small (<1cm) pericardial effusion posteriorly.      Imaging Results (All)       Procedure Component Value Units Date/Time    US Paracentesis [[id number]] Collected: 08/31/23 1431    Specimen: Body Fluid Updated: 08/31/23 1516    Narrative:      PROCEDURE: US PARACENTESIS     COMPARISON: [facility], , US PARACENTESIS, 8/28/2023, 10:08.     INDICATIONS: Cirrhosis.  CHF.  1.4 L OF CLEAR MENDES ASCITES FLUID REMOVED.  THERAPEUTIC ONLY.     CONSENT: Risks and benefits were discussed with the patient including but not limited to risk of   bleeding, infection, bowel injury and/or injury to adjacent structures. Alternatives were discussed   with the patient. The patient verbalized understanding and elected to proceed with the procedure.     TECHNIQUE/FINDINGS:   Preprocedural vitals obtained and reviewed. Preliminary bedside ultrasound demonstrated a large   amount of ascitic fluid in the right lower quadrant, site was marked with an \"arrow\".  The   overlying skin was prepped and draped in sterile fashion.  The skin was infiltrated with local   anesthesia over the insertion site with 11 mL of 2% lidocaine to anesthetize the site.  A scalpel   was used to make a small nick in the overlying skin to aid in catheter advancement.  A 5 Kitt[place] 7   cm 5to1eh catheter was then advanced into peritoneal space.  1.4 L of clear, mendes ascitic fluid was   aspirated.  Afterwards, the catheter was then removed and a bandage " was applied.  Report was given   to patient's nurse via secure epic chat via [employer] regarding procedure details.       Impression:       Ultrasound-guided paracentesis was performed without complication, patient tolerated   procedure with 1.4 L of clear, mendes ascitic fluid removed. The patient was instructed to obtain   follow up care from the referring physician.      FIDEL HILTON         Electronically Signed and Approved By: Ulises Funez M.D. on 8/31/2023 at 15:13                     XR Chest 1 View [[id number]] Collected: 08/28/23 1526     Updated: 08/28/23 1529    Narrative:      PROCEDURE: XR CHEST 1 VW     COMPARISON: [facility], CT, CT ABDOMEN PELVIS WO CONTRAST, 8/24/2023, 16:24.  [facility], CR, XR CHEST 1 VW, 8/22/2023, 2:03.     INDICATIONS: chest pain new onset     FINDINGS:   The heart looks enlarged.  There are densities within the left lower lobe which have been suggested   and could relate to some atelectasis and small left pleural effusion.  The right lung is clear.    There is some deformity of the distal end of the right clavicle which could relate to old trauma.       Impression:         1. Residual prominent markings left lower lobe that could relate to atelectasis with a small left   pleural effusion.  2. There is some cardiomegaly.  3. Deformity of the distal right clavicle which could relate to old trauma.                  DARCI RODRIGUEZ MD         Electronically Signed and Approved By: DARCI RODRIGUEZ MD on 8/28/2023 at 15:26                     US Paracentesis [[id number]] Collected: 08/28/23 1209    Specimen: Body Fluid Updated: 08/28/23 1250    Narrative:      PROCEDURE: US PARACENTESIS     COMPARISON: None     INDICATIONS: 1.9 L OF CLEAR MENDES ASCITES FLUID REMOVED.   150 ML COLLECTED AND SENT TO LAB.     CONSENT: Risks and benefits were discussed with the patient including but not limited to risk of   bleeding, infection, bowel injury and/or injury to adjacent  "structures. Alternatives were discussed   with the patient. The patient verbalized understanding and elected to proceed with the procedure.     TECHNIQUE/FINDINGS:   Preprocedural vitals obtained and reviewed. Preliminary bedside ultrasound demonstrated a large   amount of ascitic fluid in the right upper quadrant, site was marked with an \"arrow\".  The   overlying skin was prepped and draped in sterile fashion.  The skin was infiltrated with local   anesthesia over the insertion site with 10 mL of 2% lidocaine to anesthetize the site.  A scalpel   was used to make a small nick in the overlying skin to aid in catheter advancement.  A 5 Armenian 7   cm Azoieh catheter was then advanced into peritoneal space.  1.9 L of clear, mendes ascitic fluid was   aspirated.  Afterwards, the catheter was then removed and a bandage was applied. A sample of the   fluid specimen was sent to lab for further diagnostic evaluation.  Report was given to patient's   nurse regarding procedure details via secure epic chat via tech.       Impression:       Ultrasound-guided paracentesis was performed without complication, patient tolerated   procedure with 1.9 L of clear, mendes ascitic fluid removed. A sample specimen of 150 mL was sent   to the lab for further diagnostic evaluation.  The patient was instructed to obtain follow up care   from the referring physician.         FIDEL HILTON         Electronically Signed and Approved By: JAELYN BAE MD on 8/28/2023 at 12:46                     CT Abdomen Pelvis Without Contrast [[id number]] Collected: 08/25/23 0956     Updated: 08/25/23 0959    Narrative:      PROCEDURE: CT ABDOMEN PELVIS WO CONTRAST     COMPARISON: [facility], US, US ABDOMEN COMPLETE, 8/22/2023, 17:17.     INDICATIONS: Ascites, abdominal distention     TECHNIQUE: CT images were created without intravenous contrast.       PROTOCOL:   Standard imaging protocol performed      RADIATION:   DLP: 262mGy*cm   "  Automated exposure control was utilized to minimize radiation dose.      FINDINGS:   Study is limited by motion      Lung bases:  Motion limited imaging of the lung bases demonstrates a small left-sided pleural   effusion and dependent atelectasis bilaterally.  Heart size is enlarged.  Trace amount of   pericardial fluid noted.  There is a small hiatal hernia.  No free air is noted below the   diaphragm.  Moderate the large degree of ascites noted diffusely.     Organs:  Liver is heterogeneous in appearance and nodular in contour compatible with cirrhosis.  No   focal lesion noted on limited noncontrast imaging.  Gallbladder is distended and grossly   unremarkable given limitations of exam.  Punctate 1 mm nonobstructing calculi noted left kidney.    Left kidney otherwise grossly unremarkable in appearance.  Soft tissue in the left upper quadrant   which may represent splenic tissue noted.  This measures approximately 4 by 2.8 cm in size.  The   right kidney, pancreas and adrenal glands are grossly unremarkable on limited imaging     GI tract:  Small hiatal hernia noted as above.  The stomach and small bowel demonstrate no definite   acute abnormality.  Colon demonstrates no definite acute abnormality.  Appendix is not clearly   identified.  Bladder is incompletely distended.  Prostate is grossly unremarkable.     Pelvis:  The aorta is normal in caliber.  No bulky retroperitoneal adenopathy noted     Retroperitoneum:  Mild degree of diffuse body wall anasarca noted.  No destructive bone lesion.     Bones and soft tissues:  No destructive bone lesion.  Remote right-sided rib fractures noted.    There is body wall anasarca.       Impression:         1. Morphologic appearance of the liver suggesting cirrhosis.  2. There is a moderate to large degree of ascites noted within the abdomen as well as underlying   liver dysfunction and a small left-sided pleural effusion.  Findings likely related to underlying   cirrhosis.   A component of cardiac dysfunction also in the differential.  3. Cardiomegaly  4. Given limitations of the exam no definite acute intra-abdominal or intrapelvic abnormality   otherwise appreciated.            KIM PENA MD         Electronically Signed and Approved By: KIM PENA MD on 8/25/2023 at 9:56                     US Abdomen Complete [[id number]] Collected: 08/22/23 2032     Updated: 08/22/23 2035    Narrative:      PROCEDURE: US ABDOMEN COMPLETE     COMPARISON: None     INDICATIONS: Rule out ascites     TECHNIQUE: High resolution sonographic examination of the abdomen was performed.       FINDINGS:   There is underlying ascites within all 4 quadrants of the abdomen.       Impression:         1. Evidence for ascites.            DARCI RODRIGUEZ MD         Electronically Signed and Approved By: DARCI RODRIGUEZ MD on 8/22/2023 at 20:32                     XR Chest 1 View [[id number]] Collected: 08/22/23 0305     Updated: 08/22/23 0308    Narrative:      PROCEDURE: XR CHEST 1 VW     COMPARISON: None.     INDICATIONS: shortness of breath     FINDINGS:   A single AP upright portable chest radiograph reveals mild-to-moderate cardiomegaly, mild pulmonary   edema with vascular congestion, minimal, if any, pleural effusion, no pneumothorax, and an old   healed distal right clavicle fracture.  Mild thoracic aortic atherosclerotic change is possible.    There may be chronic calcified granulomatous disease of the chest.       Impression:            1. There is mild-to-moderate cardiomegaly.       2. Mild pulmonary edema with vascular congestion is suggested.  Mild subsegmental atelectasis may   involve the lung bases.  Pneumonia cannot be excluded entirely but is thought to be less likely.     3. Please see above comments for further detail.                 Please note that portions of this note were completed with a voice recognition program.     CORINA OLVERA JR, MD         Electronically Signed and Approved By:  CORINA OLVERA JR, MD on 8/22/2023 at 3:05                                 Labs Pending at Discharge:  Pending Labs       Order Current Status    Fungus Culture - Peritoneal Fluid, Peritoneum In process    Anaerobic Culture - Body Fluid, Peritoneum Preliminary result                Time spent on Discharge including face to face service: 35 minutes  Part of this note may be an electronic transcription/translation of spoken language to printed text using the Dragon Dictation System.     TElectronically signed by Geo Mclean MD, 08/31/23, 3:45 PM EDT.

## 2023-08-31 NOTE — PLAN OF CARE
Goal Outcome Evaluation:   Patient discharged home accompanied by family member.

## 2023-08-31 NOTE — DISCHARGE INSTRUCTIONS
MONITOR PARACENTESIS SITE FOR SIGNS AND SYMPTOMS OF INFECTION: REDNESS, SWELLING,TENDER TO THE TOUCH, WARM TO THE TOUCH OR DRAINAGE, FEVER.

## 2023-08-31 NOTE — PLAN OF CARE
Goal Outcome Evaluation:  Plan of Care Reviewed With: patient        Progress: no change       Alert and oriented X4. Episode of hypotension this shift. MD notified with new order for 250ml bolus. Bolus given and b/p reassessed. BP increased and stable at that time. C/o to abdomen requested prn medications. Effective upon reassessment.

## 2023-08-31 NOTE — SIGNIFICANT NOTE
08/31/23 1540   Spiritual Care   Use of Spiritual Resources non-Yarsanism use of spiritual care   Spiritual Care Source  initiative   Spiritual Care Follow-Up follow-up, none required as presently assessed   Response to Spiritual Care engaged in conversation;emotion expressed;receptive of support;thanks expressed;visit helpful   Spiritual Care Interventions prayer support provided;supportive conversation provided   Spiritual Care Visit Type follow-up   Spiritual Care Request coping/stress of illness support;family support;hospitality support;prayer support;spiritual/moral support   Receptivity to Spiritual Care visit welcomed

## 2023-09-01 ENCOUNTER — TRANSITIONAL CARE MANAGEMENT TELEPHONE ENCOUNTER (OUTPATIENT)
Dept: CALL CENTER | Facility: HOSPITAL | Age: 53
End: 2023-09-01
Payer: COMMERCIAL

## 2023-09-01 NOTE — OUTREACH NOTE
Call Center TCM Note      Flowsheet Row Responses   [facility] patient discharged from? [facility]   Does the patient have one of the following disease processes/diagnoses(primary or secondary)? CHF   TCM attempt successful? Yes   Call start time 1215   Call end time 1235   Discharge diagnosis Acute exacerbation of CHF   Meds reviewed with patient/caregiver? Yes   Is the patient having any side effects they believe may be caused by any medication additions or changes? No   Does the patient have all medications ordered at discharge? Yes   Is the patient taking all medications as directed (includes completed medication regime)? Yes   Comments New pt/ Hosp dc fu apt on 9/11/23   Does the patient have an appointment with their PCP within 7-14 days of discharge? Yes   What is the Home health agency?  [facility]   Has home health visited the patient within 72 hours of discharge? Yes   Home health comments Scheduled for consult today, 9/1/23   Pulse Ox monitoring None   Psychosocial issues? No   Did the patient receive a copy of their discharge instructions? Yes   Nursing interventions Reviewed instructions with patient   What is the patient's perception of their health status since discharge? Improving   Nursing interventions Nurse provided patient education   Is the patient able to teach back signs and symptoms of worsening condition? (i.e. weight gain, shortness of air, etc.) Yes   If the patient is a current smoker, are they able to teach back resources for cessation? [id number]   Is the patient/caregiver able to teach back the hierarchy of who to call/visit for symptoms/problems? PCP, Specialist, Home health nurse, Urgent Care, ED, 911 Yes   Notified Case Management Education issues   Is the patient able to teach back Heart Failure Zones? Yes   CHF Zone this Call Green Zone   Green Zone Patient reports doing well, No new or worsening shortness of breath, Physical activity level is normal  for you, Weight check stable, No new swelling -  feet, ankles and legs look normal for you, No chest pain   Green Zone Interventions Daily weight check, Meds as directed, Low sodium diet, Follow up visits planned   TCM call completed? Yes   Call end time 0130             Gabbie Dahl RN    9/1/2023, 12:36 EDT

## 2023-09-02 LAB — BACTERIA SPEC ANAEROBE CULT: NORMAL

## 2023-09-05 ENCOUNTER — OFFICE VISIT (OUTPATIENT)
Dept: CARDIOLOGY | Facility: CLINIC | Age: 53
End: 2023-09-05
Payer: COMMERCIAL

## 2023-09-05 ENCOUNTER — TELEPHONE (OUTPATIENT)
Dept: CARDIOLOGY | Facility: CLINIC | Age: 53
End: 2023-09-05

## 2023-09-05 ENCOUNTER — TELEPHONE (OUTPATIENT)
Dept: FAMILY MEDICINE CLINIC | Facility: CLINIC | Age: 53
End: 2023-09-05
Payer: COMMERCIAL

## 2023-09-05 VITALS
SYSTOLIC BLOOD PRESSURE: 92 MMHG | BODY MASS INDEX: 28.16 KG/M2 | DIASTOLIC BLOOD PRESSURE: 70 MMHG | WEIGHT: 169 LBS | HEIGHT: 65 IN | HEART RATE: 92 BPM

## 2023-09-05 DIAGNOSIS — I50.43 ACUTE ON CHRONIC COMBINED SYSTOLIC AND DIASTOLIC CONGESTIVE HEART FAILURE: Primary | ICD-10-CM

## 2023-09-05 DIAGNOSIS — K76.6 PORTAL HYPERTENSION: ICD-10-CM

## 2023-09-05 DIAGNOSIS — F17.229: ICD-10-CM

## 2023-09-05 RX ORDER — DIGOXIN 250 MCG
250 TABLET ORAL DAILY
Qty: 90 TABLET | Refills: 3 | Status: SHIPPED | OUTPATIENT
Start: 2023-09-05

## 2023-09-05 RX ORDER — METOPROLOL SUCCINATE 25 MG/1
25 TABLET, EXTENDED RELEASE ORAL NIGHTLY
Qty: 90 TABLET | Refills: 3 | Status: SHIPPED | OUTPATIENT
Start: 2023-09-05

## 2023-09-05 RX ORDER — NAPROXEN SODIUM 220 MG
220 TABLET ORAL 2 TIMES DAILY PRN
COMMUNITY
End: 2023-09-05

## 2023-09-05 RX ORDER — VERICIGUAT 2.5 MG/1
2.5 TABLET, FILM COATED ORAL DAILY
Qty: 90 TABLET | Refills: 3 | Status: SHIPPED | OUTPATIENT
Start: 2023-09-05

## 2023-09-05 RX ORDER — VERICIGUAT 2.5 MG/1
2.5 TABLET, FILM COATED ORAL DAILY
Qty: 14 TABLET | Refills: 0 | COMMUNITY
Start: 2023-09-05

## 2023-09-05 RX ORDER — LEVOCETIRIZINE DIHYDROCHLORIDE 5 MG/1
5 TABLET, FILM COATED ORAL EVERY EVENING
COMMUNITY

## 2023-09-05 NOTE — TELEPHONE ENCOUNTER
The [facility] received a fax that requires your attention. The document has been indexed to the patient’s chart for your review.      Reason for sending: EXTERNAL MEDICAL RECORD NOTIFICATION     Documents Description: MEDS-VERQUVO PA NEEDED-9.5.23    Name of Sender: [facility]     Date Indexed: 9.5.23

## 2023-09-05 NOTE — PATIENT INSTRUCTIONS
1.  Increase Toprol/metoprolol XL to 25 mg once a day but take it at bedtime.  2.  Start digoxin 0.25 mg once a day in the evening.  3.  Start Verquvo 2.5 mg in the evening once daily starting starting this Friday.  4.  Do a blood pressure heart rate and weight log.  5.  Do blood work 1 or 2 days before next office visit in 2 weeks   Keystone Flap Text: The defect edges were debeveled with a #15 scalpel blade.  Given the location of the defect, shape of the defect a keystone flap was deemed most appropriate.  Using a sterile surgical marker, an appropriate keystone flap was drawn incorporating the defect, outlining the appropriate donor tissue and placing the expected incisions within the relaxed skin tension lines where possible. The area thus outlined was incised deep to adipose tissue with a #15 scalpel blade.  The skin margins were undermined to an appropriate distance in all directions around the primary defect and laterally outward around the flap utilizing iris scissors.

## 2023-09-05 NOTE — PROGRESS NOTES
New Patient Office Visit    Chief Complaint  acute on chronic CHF    Subjective            Greyson Schofield presents to [facility] CARDIOLOG[facility]  History of Present Illness  Mr. Jackson is a 53 years old gentleman with severe CHF with reduced ejection fraction who is in for a follow-up after recent hospitalization.  He had been having swelling in his feet for almost a year but did not pay any attention to.  His excess capacity was going down.  His family brought him here from [place] and he was admitted to the hospital with severe CHF and cirrhosis related both to alcoholism.  He has stopped smoking and drinking alcohol but he has resorted to chewing tobacco.  I explained to him that it is as dangerous and he needs to quit the smokeless tobacco.    Past Medical History:   Diagnosis Date    Arthritis        No Known Allergies     Past Surgical History:   Procedure Laterality Date    APPENDECTOMY      CARDIAC CATHETERIZATION N/A 8/25/2023    Procedure: Left Heart Cath with possible coronary angioplasty;  Surgeon: Jomar Lynch MD;  Location: [facility] CATH INVASIVE LOCATION;  Service: Cardiology;  Laterality: N/A;        Social History     Tobacco Use    Smoking status: Every Day     Packs/day: 0.50     Years: 33.00     Pack years: 16.50     Types: Cigarettes     Start date: 10/1993    Smokeless tobacco: Current     Types: Snuff   Vaping Use    Vaping Use: Never used   Substance Use Topics    Alcohol use: Not Currently     Comment: history of alcohol abuse    Drug use: Not Currently     Types: Marijuana, Cocaine(coke)       History reviewed. No pertinent family history.     Prior to Admission medications    Medication Sig Start Date End Date Taking? Authorizing Provider   B Complex Vitamins (B COMPLEX 1 PO) Take 1 tablet by mouth Daily.   Yes Provider, MD Gertrudis   dapagliflozin Propanediol (Farxiga) 10 MG tablet Take 1 tablet by mouth Daily for 30 days. 8/31/23 9/30/23 Yes Geo Mclean MD    hydrOXYzine pamoate (Vistaril) 25 MG capsule Take 1 capsule by mouth 3 (Three) Times a Day As Needed for Itching for up to 7 days. 8/31/23 9/7/23 Yes Geo Mclean MD   levocetirizine (XYZAL) 5 MG tablet Take 1 tablet by mouth Every Evening.   Yes ProviderGertrudis MD   levothyroxine (SYNTHROID, LEVOTHROID) 100 MCG tablet Take 1 tablet by mouth Every Morning for 30 days. 9/1/23 10/1/23 Yes Geo Mclean MD   metoprolol succinate XL (TOPROL-XL) 25 MG 24 hr tablet Take 0.5 tablets by mouth Daily for 30 days. 9/1/23 10/1/23 Yes Geo Mclean MD   multivitamin with minerals tablet tablet Take 1 tablet by mouth Daily for 30 days. 8/31/23 9/30/23 Yes Geo Mclean MD   naproxen sodium (ALEVE) 220 MG tablet Take 1 tablet by mouth 2 (Two) Times a Day As Needed.   Yes ProviderGertrudis MD   spironolactone (ALDACTONE) 25 MG tablet Take 1 tablet by mouth Daily for 30 days. 8/31/23 9/30/23 Yes Geo Mclean MD   thiamine (VITAMIN B-1) 100 MG tablet Take 1 tablet by mouth Daily for 30 days. 9/1/23 10/1/23 Yes Geo Mclean MD        Review of Systems   Constitutional:  Positive for fatigue. Negative for unexpected weight gain and unexpected weight loss.   HENT:  Positive for hearing loss, sinus pressure and swollen glands.    Eyes:  Negative for blurred vision and double vision.   Respiratory:  Positive for shortness of breath. Negative for cough and wheezing.    Cardiovascular:  Positive for palpitations. Negative for chest pain and leg swelling.   Gastrointestinal:  Negative for nausea and vomiting.   Endocrine: Positive for cold intolerance. Negative for heat intolerance, polydipsia and polyuria.   Musculoskeletal:  Positive for arthralgias and back pain.   Neurological:  Positive for dizziness and light-headedness. Negative for headache.   Hematological:  Bruises/bleeds easily.      Symptom Course: Improved    Weight Trend: Decreasing Steadily      Objective     BP 92/70   Pulse 92   Ht 165.1 cm  "(65\")   Wt 76.7 kg (169 lb)   BMI 28.12 kg/m²       Physical Exam  Constitutional:       General: He is awake.      Appearance: Normal appearance.   Neck:      Thyroid: No thyromegaly.      Vascular: No carotid bruit or JVD.   Cardiovascular:      Rate and Rhythm: Normal rate and regular rhythm.      Chest Wall: PMI is not displaced.      Pulses: Normal pulses.      Heart sounds: Normal heart sounds, S1 normal and S2 normal. No murmur heard.    No friction rub. No gallop. No S3 or S4 sounds.   Pulmonary:      Effort: Pulmonary effort is normal.      Breath sounds: Normal breath sounds and air entry. No wheezing, rhonchi or rales.   Abdominal:      General: Bowel sounds are normal.      Palpations: Abdomen is soft. There is no mass.      Tenderness: There is no abdominal tenderness.   Musculoskeletal:      Cervical back: Neck supple.      Right lower le+ Edema present.      Left lower le+ Edema present.   Neurological:      Mental Status: He is alert and oriented to person, place, and time.   Psychiatric:         Mood and Affect: Mood normal.         Behavior: Behavior is cooperative.         Result Review :                      Lab Results   Component Value Date    PROBNP 2,246.0 (H) 2023     CMP          2023    04:28 2023    05:40 2023    00:55 2023    04:31   CMP   Glucose 93  90   81    BUN 12  14   22    Creatinine 1.26  1.14   1.16    EGFR 69.1  77.9   76.3    Sodium 127  127   130    Potassium 3.6  3.7   3.8    Chloride 90  91   95    Calcium 8.6  8.8   8.8    Total Protein 6.9  6.9      Albumin 4.0  4.6  4.1  4.4    Total Bilirubin 0.4  0.5      Alkaline Phosphatase 92  75      AST (SGOT) 50  46      ALT (SGPT) 13  13      BUN/Creatinine Ratio 9.5  12.3   19.0    Anion Gap 10.4  9.0   9.8      CBC w/diff          2023    04:45 2023    04:28 2023    05:40   CBC w/Diff   WBC 6.54  6.30  5.77    RBC 3.27  3.31  3.18    Hemoglobin 11.6  11.4  10.9    Hematocrit " 32.6  33.7  32.6    MCV 99.7  101.8  102.5    MCH 35.5  34.4  34.3    MCHC 35.6  33.8  33.4    RDW 15.0  15.3  15.3    Platelets 336  351  331    Neutrophil Rel % 54.9  54.4  53.4    Immature Granulocyte Rel % 0.3  0.3  0.3    Lymphocyte Rel % 30.0  31.3  35.2    Monocyte Rel % 10.1  10.3  7.5    Eosinophil Rel % 3.8  2.7  2.6    Basophil Rel % 0.9  1.0  1.0       Lipid Panel          8/22/2023    04:38 8/26/2023    04:45   Lipid Panel   Total Cholesterol 128  162    Triglycerides 60  64    HDL Cholesterol 57  76    VLDL Cholesterol 13  13    LDL Cholesterol  58  73    LDL/HDL Ratio 1.04  0.96       Lab Results   Component Value Date    TSH 53.500 (H) 08/27/2023      No results found for: FREET4   No results found for: DDIMERQUANT  Magnesium   Date Value Ref Range Status   08/29/2023 2.1 1.6 - 2.6 mg/dL Final      No results found for: DIGOXIN   A1C Last 3 Results          8/22/2023    01:51   HGBA1C Last 3 Results   Hemoglobin A1C 5.40       Results for orders placed during the hospital encounter of 08/22/23    Adult Transthoracic Echo Complete W/ Cont if Necessary Per Protocol    Interpretation Summary    The left ventricular cavity is severely dilated.    Left ventricular systolic function is severely decreased. Calculated left ventricular EF = 10.3%    Left ventricular diastolic function is consistent with (grade II w/high LAP) pseudonormalization.    Mildly reduced right ventricular systolic function noted.    The left atrial cavity is severely dilated.    Estimated right ventricular systolic pressure from tricuspid regurgitation is normal (<35 mmHg).    There is a small (<1cm) pericardial effusion posteriorly.             Assessment and Plan        Diagnoses and all orders for this visit:    1. Acute on chronic combined systolic and diastolic congestive heart failure (Primary)  Assessment & Plan:  Mr. Jackson has severe CHF with markedly reduced ejection fraction.  He runs low blood pressure and therefore I am  going to digitalize and a few days later start him on Verquvo.  In addition I am making the following changes in his medications:    1.  Increase Toprol/metoprolol XL to 25 mg once a day but take it at bedtime.  2.  Start digoxin 0.25 mg once a day in the evening.  3.  Start Verquvo 2.5 mg in the evening once daily starting starting this Friday.  4.  Do a blood pressure heart rate and weight log.  5.  Do blood work 1 or 2 days before next office visit in 2 weeks.  6.  Continue rest of the medication in the current dosage including Farxiga.  If at the time of his next office visit,His blood pressure shows improvement we will start Entresto    Orders:  -     metoprolol succinate XL (TOPROL-XL) 25 MG 24 hr tablet; Take 1 tablet by mouth Every Night.  Dispense: 90 tablet; Refill: 3  -     digoxin (LANOXIN) 250 MCG tablet; Take 1 tablet by mouth Daily.  Dispense: 90 tablet; Refill: 3  -     Vericiguat (Verquvo) 2.5 MG tablet; Take 1 tablet by mouth Daily.  Dispense: 90 tablet; Refill: 3  -     CBC & Differential; Future  -     Comprehensive Metabolic Panel; Future  -     Digoxin Level; Future  -     proBNP; Future  -     Magnesium; Future  -     25-HydroxyVitamin D LCMS D2+D3; Future  -     Vericiguat (Verquvo) 2.5 MG tablet; Take 1 tablet by mouth Daily. Indications: [id number] exp 2/21/2025  Dispense: 14 tablet; Refill: 0    2. Nicotine dependence, chewing tobacco, w unsp disorders  Assessment & Plan:  He smokes cigarettes for many and was alcoholic.  Following his recent hospitalization he quit smoking cigarettes and drinking alcohol.  However he started using smokeless tobacco.  I have warned him about the dangers of smokeless tobacco and smoking cessation counseling was performed for 5 minutes.  Emphasized to him the reason to stop smokeless tobacco.  He seems to be sufficiently motivated and is going to quit soon over the next few days.  If he needs we will prescribe him nicotine patches.      3. Portal  hypertension        Education  Greyson Schofield 1970 male  who presents for follow-up of congestive heart failure. Counseling was provided to Greyson Schofield for the following topics.   Daily log and monitoring of blood pressure, Greyson Schofield was instructed to keep a blood pressure log to monitor for trends of normal and possible abnormal blood pressure readings. Greyson Schofield was instructed to bring the blood pressure log to next appointment. Greyson Schofield was instructed to keep a daily weight log and to monitor for weight gain. Instructed to notify the physician office if patient experiences a weight gain of 2-3 pounds overnight, or if experiences a weight gain of 5 pounds in a week. Instruction provided to the patient to elevate bilateral lower extremities to help alleviate edema. Instruction provided to patient to decrease salt and fluid intake. Instruction provided on new medications and possible side effects, and when to call the physician office.     Follow Up     Return for fu in 2 weeks with madeleine.    Patient was given instructions and counseling regarding his condition or for health maintenance advice. Please see specific information pulled into the AVS if appropriate.     Total time spent, 45 minutes.This time includes time spent by me for the following activities:  Reviewing past records including hospitalizations, performing a cardiac focused examination and/or evaluation, educating and counselling the patient and family, independently interpreting results and diagnostic tests and communicating that information to patient and family, documenting information in the medical record, etc.     Electronically signed by Lani Galeas MD, 09/05/23, 10:29 AM EDT.

## 2023-09-05 NOTE — TELEPHONE ENCOUNTER
[facility] called this morning, they wanted to let you know a little bit about him after seeing him since he will be a new patient. The nurse states he has CHF, Cirrhosis of the liver due to history of alcoholism, non stemi, supposed to wear a life vest due to less than 15% Ejection fraction and oxygen has been running around 89%.

## 2023-09-05 NOTE — ASSESSMENT & PLAN NOTE
Mr. Jackson has severe CHF with markedly reduced ejection fraction.  He runs low blood pressure and therefore I am going to digitalize and a few days later start him on Verquvo.  In addition I am making the following changes in his medications:    1.  Increase Toprol/metoprolol XL to 25 mg once a day but take it at bedtime.  2.  Start digoxin 0.25 mg once a day in the evening.  3.  Start Verquvo 2.5 mg in the evening once daily starting starting this Friday.  4.  Do a blood pressure heart rate and weight log.  5.  Do blood work 1 or 2 days before next office visit in 2 weeks.  6.  Continue rest of the medication in the current dosage including Farxiga.  If at the time of his next office visit,His blood pressure shows improvement we will start Entresto

## 2023-09-05 NOTE — ASSESSMENT & PLAN NOTE
He smokes cigarettes for many and was alcoholic.  Following his recent hospitalization he quit smoking cigarettes and drinking alcohol.  However he started using smokeless tobacco.  I have warned him about the dangers of smokeless tobacco and smoking cessation counseling was performed for 5 minutes.  Emphasized to him the reason to stop smokeless tobacco.  He seems to be sufficiently motivated and is going to quit soon over the next few days.  If he needs we will prescribe him nicotine patches.

## 2023-09-06 ENCOUNTER — TELEPHONE (OUTPATIENT)
Dept: CARDIOLOGY | Facility: CLINIC | Age: 53
End: 2023-09-06

## 2023-09-06 LAB
QT INTERVAL: 411 MS
QTC INTERVAL: 486 MS

## 2023-09-06 NOTE — TELEPHONE ENCOUNTER
The [facility] received a fax that requires your attention. The document has been indexed to the patient’s chart for your review.      Reason for sending: EXTERNAL MEDICAL RECORD NOTIFICATION     Documents Description: MEDS-VERQUVO APPROVED-9.6.23    Name of Sender: [employer]     Date Indexed: 9.6.23

## 2023-09-07 NOTE — TELEPHONE ENCOUNTER
This is a very sick patient.  They need to be following up with Dr. Galeas in the heart clinic.  I am scheduled to see him 9/11.

## 2023-09-10 ENCOUNTER — READMISSION MANAGEMENT (OUTPATIENT)
Dept: CALL CENTER | Facility: HOSPITAL | Age: 53
End: 2023-09-10
Payer: COMMERCIAL

## 2023-09-10 ENCOUNTER — APPOINTMENT (OUTPATIENT)
Dept: GENERAL RADIOLOGY | Facility: HOSPITAL | Age: 53
DRG: 281 | End: 2023-09-10
Payer: COMMERCIAL

## 2023-09-10 ENCOUNTER — HOSPITAL ENCOUNTER (INPATIENT)
Facility: HOSPITAL | Age: 53
LOS: 1 days | Discharge: HOME | DRG: 281 | End: 2023-09-11
Attending: EMERGENCY MEDICINE | Admitting: FAMILY MEDICINE
Payer: COMMERCIAL

## 2023-09-10 DIAGNOSIS — R26.2 DIFFICULTY IN WALKING: ICD-10-CM

## 2023-09-10 DIAGNOSIS — J81.0 ACUTE PULMONARY EDEMA: ICD-10-CM

## 2023-09-10 DIAGNOSIS — I50.9 ACUTE CONGESTIVE HEART FAILURE, UNSPECIFIED HEART FAILURE TYPE: Primary | ICD-10-CM

## 2023-09-10 DIAGNOSIS — R60.0 PEDAL EDEMA: ICD-10-CM

## 2023-09-10 PROBLEM — K74.60 CIRRHOSIS OF LIVER: Status: ACTIVE | Noted: 2023-09-10

## 2023-09-10 LAB
ALBUMIN SERPL-MCNC: 5.1 G/DL (ref 3.5–5.2)
ALBUMIN/GLOB SERPL: 1.6 G/DL
ALP SERPL-CCNC: 106 U/L (ref 39–117)
ALT SERPL W P-5'-P-CCNC: 32 U/L (ref 1–41)
ANION GAP SERPL CALCULATED.3IONS-SCNC: 10.8 MMOL/L (ref 5–15)
AST SERPL-CCNC: 52 U/L (ref 1–40)
BASOPHILS # BLD AUTO: 0.1 10*3/MM3 (ref 0–0.2)
BASOPHILS NFR BLD AUTO: 1.8 % (ref 0–1.5)
BILIRUB SERPL-MCNC: 0.3 MG/DL (ref 0–1.2)
BUN SERPL-MCNC: 15 MG/DL (ref 6–20)
BUN/CREAT SERPL: 13.5 (ref 7–25)
CALCIUM SPEC-SCNC: 9.7 MG/DL (ref 8.6–10.5)
CHLORIDE SERPL-SCNC: 97 MMOL/L (ref 98–107)
CO2 SERPL-SCNC: 26.2 MMOL/L (ref 22–29)
CREAT SERPL-MCNC: 1.11 MG/DL (ref 0.76–1.27)
CRP SERPL-MCNC: 0.48 MG/DL (ref 0–0.5)
DEPRECATED RDW RBC AUTO: 56.3 FL (ref 37–54)
DIGOXIN SERPL-MCNC: 0.94 NG/ML (ref 0.6–1.2)
EGFRCR SERPLBLD CKD-EPI 2021: 80.4 ML/MIN/1.73
EOSINOPHIL # BLD AUTO: 0.25 10*3/MM3 (ref 0–0.4)
EOSINOPHIL NFR BLD AUTO: 4.4 % (ref 0.3–6.2)
ERYTHROCYTE [DISTWIDTH] IN BLOOD BY AUTOMATED COUNT: 14.9 % (ref 12.3–15.4)
GEN 5 2HR TROPONIN T REFLEX: 74 NG/L
GLOBULIN UR ELPH-MCNC: 3.2 GM/DL
GLUCOSE SERPL-MCNC: 86 MG/DL (ref 65–99)
HCT VFR BLD AUTO: 31.3 % (ref 37.5–51)
HGB BLD-MCNC: 10.8 G/DL (ref 13–17.7)
HOLD SPECIMEN: NORMAL
HOLD SPECIMEN: NORMAL
IMM GRANULOCYTES # BLD AUTO: 0.01 10*3/MM3 (ref 0–0.05)
IMM GRANULOCYTES NFR BLD AUTO: 0.2 % (ref 0–0.5)
LIPASE SERPL-CCNC: 275 U/L (ref 13–60)
LYMPHOCYTES # BLD AUTO: 1.85 10*3/MM3 (ref 0.7–3.1)
LYMPHOCYTES NFR BLD AUTO: 32.6 % (ref 19.6–45.3)
MAGNESIUM SERPL-MCNC: 2.1 MG/DL (ref 1.6–2.6)
MCH RBC QN AUTO: 35.3 PG (ref 26.6–33)
MCHC RBC AUTO-ENTMCNC: 34.5 G/DL (ref 31.5–35.7)
MCV RBC AUTO: 102.3 FL (ref 79–97)
MONOCYTES # BLD AUTO: 0.56 10*3/MM3 (ref 0.1–0.9)
MONOCYTES NFR BLD AUTO: 9.9 % (ref 5–12)
NEUTROPHILS NFR BLD AUTO: 2.9 10*3/MM3 (ref 1.7–7)
NEUTROPHILS NFR BLD AUTO: 51.1 % (ref 42.7–76)
NRBC BLD AUTO-RTO: 0 /100 WBC (ref 0–0.2)
NT-PROBNP SERPL-MCNC: 3108 PG/ML (ref 0–900)
PLATELET # BLD AUTO: 361 10*3/MM3 (ref 140–450)
PMV BLD AUTO: 8.5 FL (ref 6–12)
POTASSIUM SERPL-SCNC: 4.5 MMOL/L (ref 3.5–5.2)
PROT SERPL-MCNC: 8.3 G/DL (ref 6–8.5)
QT INTERVAL: 376 MS
QT INTERVAL: 406 MS
QT INTERVAL: 427 MS
QTC INTERVAL: 430 MS
QTC INTERVAL: 437 MS
QTC INTERVAL: 460 MS
RBC # BLD AUTO: 3.06 10*6/MM3 (ref 4.14–5.8)
SODIUM SERPL-SCNC: 134 MMOL/L (ref 136–145)
TROPONIN T DELTA: -4 NG/L
TROPONIN T SERPL HS-MCNC: 76 NG/L
TROPONIN T SERPL HS-MCNC: 78 NG/L
WBC NRBC COR # BLD: 5.67 10*3/MM3 (ref 3.4–10.8)
WHOLE BLOOD HOLD COAG: NORMAL
WHOLE BLOOD HOLD SPECIMEN: NORMAL

## 2023-09-10 PROCEDURE — 96372 THER/PROPH/DIAG INJ SC/IM: CPT

## 2023-09-10 PROCEDURE — 99254 IP/OBS CNSLTJ NEW/EST MOD 60: CPT | Performed by: INTERNAL MEDICINE

## 2023-09-10 PROCEDURE — 93010 ELECTROCARDIOGRAM REPORT: CPT | Performed by: INTERNAL MEDICINE

## 2023-09-10 PROCEDURE — 71045 X-RAY EXAM CHEST 1 VIEW: CPT

## 2023-09-10 PROCEDURE — 84484 ASSAY OF TROPONIN QUANT: CPT | Performed by: EMERGENCY MEDICINE

## 2023-09-10 PROCEDURE — 36415 COLL VENOUS BLD VENIPUNCTURE: CPT

## 2023-09-10 PROCEDURE — 85025 COMPLETE CBC W/AUTO DIFF WBC: CPT | Performed by: EMERGENCY MEDICINE

## 2023-09-10 PROCEDURE — 83735 ASSAY OF MAGNESIUM: CPT | Performed by: EMERGENCY MEDICINE

## 2023-09-10 PROCEDURE — 80053 COMPREHEN METABOLIC PANEL: CPT | Performed by: EMERGENCY MEDICINE

## 2023-09-10 PROCEDURE — 84484 ASSAY OF TROPONIN QUANT: CPT | Performed by: FAMILY MEDICINE

## 2023-09-10 PROCEDURE — 86140 C-REACTIVE PROTEIN: CPT | Performed by: FAMILY MEDICINE

## 2023-09-10 PROCEDURE — 80162 ASSAY OF DIGOXIN TOTAL: CPT | Performed by: EMERGENCY MEDICINE

## 2023-09-10 PROCEDURE — 25010000002 FUROSEMIDE PER 20 MG: Performed by: EMERGENCY MEDICINE

## 2023-09-10 PROCEDURE — 83690 ASSAY OF LIPASE: CPT | Performed by: EMERGENCY MEDICINE

## 2023-09-10 PROCEDURE — 93005 ELECTROCARDIOGRAM TRACING: CPT

## 2023-09-10 PROCEDURE — 25010000002 ENOXAPARIN PER 10 MG: Performed by: FAMILY MEDICINE

## 2023-09-10 PROCEDURE — 93005 ELECTROCARDIOGRAM TRACING: CPT | Performed by: EMERGENCY MEDICINE

## 2023-09-10 PROCEDURE — 83880 ASSAY OF NATRIURETIC PEPTIDE: CPT | Performed by: EMERGENCY MEDICINE

## 2023-09-10 PROCEDURE — 93005 ELECTROCARDIOGRAM TRACING: CPT | Performed by: FAMILY MEDICINE

## 2023-09-10 PROCEDURE — 99285 EMERGENCY DEPT VISIT HI MDM: CPT

## 2023-09-10 PROCEDURE — 25010000002 FUROSEMIDE PER 20 MG: Performed by: INTERNAL MEDICINE

## 2023-09-10 PROCEDURE — 96376 TX/PRO/DX INJ SAME DRUG ADON: CPT

## 2023-09-10 PROCEDURE — 96374 THER/PROPH/DIAG INJ IV PUSH: CPT

## 2023-09-10 RX ORDER — ONDANSETRON 2 MG/ML
4 INJECTION INTRAMUSCULAR; INTRAVENOUS EVERY 6 HOURS PRN
Status: DISCONTINUED | OUTPATIENT
Start: 2023-09-10 | End: 2023-09-11 | Stop reason: HOSPADM

## 2023-09-10 RX ORDER — FUROSEMIDE 10 MG/ML
40 INJECTION INTRAMUSCULAR; INTRAVENOUS EVERY 12 HOURS
Status: DISCONTINUED | OUTPATIENT
Start: 2023-09-10 | End: 2023-09-10

## 2023-09-10 RX ORDER — POLYETHYLENE GLYCOL 3350 17 G/17G
17 POWDER, FOR SOLUTION ORAL DAILY PRN
Status: DISCONTINUED | OUTPATIENT
Start: 2023-09-10 | End: 2023-09-11 | Stop reason: HOSPADM

## 2023-09-10 RX ORDER — FUROSEMIDE 10 MG/ML
40 INJECTION INTRAMUSCULAR; INTRAVENOUS
Status: DISCONTINUED | OUTPATIENT
Start: 2023-09-10 | End: 2023-09-11

## 2023-09-10 RX ORDER — SODIUM CHLORIDE 9 MG/ML
40 INJECTION, SOLUTION INTRAVENOUS AS NEEDED
Status: DISCONTINUED | OUTPATIENT
Start: 2023-09-10 | End: 2023-09-11 | Stop reason: HOSPADM

## 2023-09-10 RX ORDER — ASPIRIN 81 MG/1
324 TABLET, CHEWABLE ORAL ONCE
Status: COMPLETED | OUTPATIENT
Start: 2023-09-10 | End: 2023-09-10

## 2023-09-10 RX ORDER — SPIRONOLACTONE 25 MG/1
25 TABLET ORAL DAILY
Status: DISCONTINUED | OUTPATIENT
Start: 2023-09-10 | End: 2023-09-11 | Stop reason: HOSPADM

## 2023-09-10 RX ORDER — SODIUM CHLORIDE 0.9 % (FLUSH) 0.9 %
10 SYRINGE (ML) INJECTION AS NEEDED
Status: DISCONTINUED | OUTPATIENT
Start: 2023-09-10 | End: 2023-09-11 | Stop reason: HOSPADM

## 2023-09-10 RX ORDER — FAMOTIDINE 20 MG/1
40 TABLET, FILM COATED ORAL DAILY
Status: DISCONTINUED | OUTPATIENT
Start: 2023-09-10 | End: 2023-09-11 | Stop reason: HOSPADM

## 2023-09-10 RX ORDER — FUROSEMIDE 10 MG/ML
80 INJECTION INTRAMUSCULAR; INTRAVENOUS ONCE
Status: COMPLETED | OUTPATIENT
Start: 2023-09-10 | End: 2023-09-10

## 2023-09-10 RX ORDER — TEMAZEPAM 15 MG/1
15 CAPSULE ORAL NIGHTLY PRN
Status: DISCONTINUED | OUTPATIENT
Start: 2023-09-10 | End: 2023-09-11 | Stop reason: HOSPADM

## 2023-09-10 RX ORDER — BISACODYL 10 MG
10 SUPPOSITORY, RECTAL RECTAL DAILY PRN
Status: DISCONTINUED | OUTPATIENT
Start: 2023-09-10 | End: 2023-09-11 | Stop reason: HOSPADM

## 2023-09-10 RX ORDER — BISACODYL 5 MG/1
5 TABLET, DELAYED RELEASE ORAL DAILY PRN
Status: DISCONTINUED | OUTPATIENT
Start: 2023-09-10 | End: 2023-09-11 | Stop reason: HOSPADM

## 2023-09-10 RX ORDER — METOPROLOL SUCCINATE 25 MG/1
25 TABLET, EXTENDED RELEASE ORAL NIGHTLY
Status: DISCONTINUED | OUTPATIENT
Start: 2023-09-10 | End: 2023-09-11 | Stop reason: HOSPADM

## 2023-09-10 RX ORDER — ENOXAPARIN SODIUM 100 MG/ML
40 INJECTION SUBCUTANEOUS DAILY
Status: DISCONTINUED | OUTPATIENT
Start: 2023-09-10 | End: 2023-09-11 | Stop reason: HOSPADM

## 2023-09-10 RX ORDER — LEVOTHYROXINE SODIUM 0.1 MG/1
100 TABLET ORAL
Status: DISCONTINUED | OUTPATIENT
Start: 2023-09-10 | End: 2023-09-11 | Stop reason: HOSPADM

## 2023-09-10 RX ORDER — SODIUM CHLORIDE 0.9 % (FLUSH) 0.9 %
10 SYRINGE (ML) INJECTION EVERY 12 HOURS SCHEDULED
Status: DISCONTINUED | OUTPATIENT
Start: 2023-09-10 | End: 2023-09-11 | Stop reason: HOSPADM

## 2023-09-10 RX ORDER — ASPIRIN 81 MG/1
81 TABLET ORAL DAILY
Status: DISCONTINUED | OUTPATIENT
Start: 2023-09-10 | End: 2023-09-11 | Stop reason: HOSPADM

## 2023-09-10 RX ORDER — AMOXICILLIN 250 MG
2 CAPSULE ORAL 2 TIMES DAILY
Status: DISCONTINUED | OUTPATIENT
Start: 2023-09-10 | End: 2023-09-11 | Stop reason: HOSPADM

## 2023-09-10 RX ORDER — NITROGLYCERIN 0.4 MG/1
0.4 TABLET SUBLINGUAL
Status: DISCONTINUED | OUTPATIENT
Start: 2023-09-10 | End: 2023-09-11 | Stop reason: HOSPADM

## 2023-09-10 RX ADMIN — FUROSEMIDE 80 MG: 10 INJECTION, SOLUTION INTRAMUSCULAR; INTRAVENOUS at 02:02

## 2023-09-10 RX ADMIN — Medication 10 ML: at 10:40

## 2023-09-10 RX ADMIN — FUROSEMIDE 40 MG: 10 INJECTION, SOLUTION INTRAMUSCULAR; INTRAVENOUS at 17:47

## 2023-09-10 RX ADMIN — ENOXAPARIN SODIUM 40 MG: 100 INJECTION SUBCUTANEOUS at 10:39

## 2023-09-10 RX ADMIN — ASPIRIN 81 MG: 81 TABLET, COATED ORAL at 10:39

## 2023-09-10 RX ADMIN — Medication 10 ML: at 20:19

## 2023-09-10 RX ADMIN — SPIRONOLACTONE 25 MG: 25 TABLET ORAL at 10:39

## 2023-09-10 RX ADMIN — FAMOTIDINE 40 MG: 20 TABLET ORAL at 10:39

## 2023-09-10 RX ADMIN — LEVOTHYROXINE SODIUM 100 MCG: 0.1 TABLET ORAL at 10:38

## 2023-09-10 RX ADMIN — FUROSEMIDE 40 MG: 10 INJECTION, SOLUTION INTRAMUSCULAR; INTRAVENOUS at 10:39

## 2023-09-10 RX ADMIN — EMPAGLIFLOZIN 10 MG: 10 TABLET, FILM COATED ORAL at 10:39

## 2023-09-10 RX ADMIN — ASPIRIN 324 MG: 81 TABLET, CHEWABLE ORAL at 01:13

## 2023-09-10 NOTE — PLAN OF CARE
Goal Outcome Evaluation:  Plan of Care Reviewed With: patient, family      Admit from ER. Patient has life vest on. No complaints from patient during shift.

## 2023-09-10 NOTE — H&P
[facility]   HISTORY AND PHYSICAL    Patient Name: Greyson Schofield  : 1970  MRN: [id number]  Primary Care Physician:  Bertha Doe DO  Date of admission: 9/10/2023    Subjective   Subjective     Chief Complaint: Chest pain, shortness of breath    HPI:    Greyson Schofield is a 53 y.o. male with past with history of heart failure with reduced ejection fraction (10% EF), alcoholic cirrhosis, arthritis, and GERD presented to the ED with complaints of chest pain and shortness of breath.  Patient states that around 10 PM last night he had sudden onset shortness of breath with right-sided chest pain that he described as sharp, 7 out of 10, and radiating down the right arm.  Patient had been recently diagnosed with congestive heart failure at this facility 2 weeks prior and discharged on report medications, a LifeVest and outpatient follow-up.  Patient states that he will be compliant with all his medications as prescribed but on occasion does not wear his LifeVest due to it being very uncomfortable.  In the ED patient's vitals were all within normal limits on arrival and did not require oxygen supplementation.  Labs did show that he had a significantly elevated BNP level of 3108 with an elevated troponin as well (elevated on prior admission as well).  Remaining labs relatively unremarkable given his chronic conditions.  EKG showed sinus rhythm with prolonged MI interval and no significant ST changes.  Chest x-ray with mild pulmonary edema with no other acute findings.  When asked she denied any recent fevers, chills, headaches, focal weakness,  nausea, vomiting, diarrhea, constipation, dysuria, hematuria, hematochezia, melena.  He did complain of anxiety and lower abdominal pain as well.  Patient admitted for further evaluation and treatment.    Review of Systems   All systems were reviewed and negative except for: As noted in HPI    Personal History     Past Medical History:   Diagnosis Date   • Arthritis    •  CHF (congestive heart failure)    • Cirrhosis        Past Surgical History:   Procedure Laterality Date   • APPENDECTOMY     • CARDIAC CATHETERIZATION N/A 8/25/2023    Procedure: Left Heart Cath with possible coronary angioplasty;  Surgeon: Jomar Lynch MD;  Location: [facility] CATH INVASIVE LOCATION;  Service: Cardiology;  Laterality: N/A;       Family History: family history is not on file. Otherwise pertinent FHx was reviewed and not pertinent to current issue.    Social History:  reports that he has been smoking cigarettes. He started smoking about 29 years ago. He has a 16.50 pack-year smoking history. His smokeless tobacco use includes snuff. He reports that he does not currently use alcohol. He reports that he does not currently use drugs after having used the following drugs: Marijuana and Cocaine(coke).    Home Medications:  B Complex Vitamins, Thiamine Mononitrate, Vericiguat, dapagliflozin Propanediol, digoxin, hydrOXYzine pamoate, levocetirizine, levothyroxine, metoprolol succinate XL, multivitamin with minerals, and spironolactone      Allergies:  No Known Allergies    Objective   Objective     Vitals:   Temp:  [97.5 °F (36.4 °C)-97.6 °F (36.4 °C)] 97.5 °F (36.4 °C)  Heart Rate:  [70-91] 91  Resp:  [16-18] 16  BP: (100-116)/(66-80) 100/66  Physical Exam    Constitutional: Awake, alert   Eyes: PERRLA, sclerae anicteric, no conjunctival injection   HENT: NCAT, mucous membranes moist   Neck: Supple, no thyromegaly, no lymphadenopathy, trachea midline   Respiratory: Clear to auscultation bilaterally, nonlabored respirations    Cardiovascular: RRR, no murmurs, rubs, or gallops, palpable pedal pulses bilaterally   Gastrointestinal: Positive bowel sounds, soft, nontender, nondistended   Musculoskeletal: No bilateral ankle edema, no clubbing or cyanosis to extremities   Psychiatric: Appropriate affect, cooperative   Neurologic: Oriented x 3, strength symmetric in all extremities, Cranial Nerves grossly  intact to confrontation, speech clear   Skin: No rashes     Result Review    Result Review:  I have personally reviewed the results from the time of this admission to 9/10/2023 04:29 EDT and agree with these findings:  [x]  Laboratory list / accordion  []  Microbiology  [x]  Radiology  [x]  EKG/Telemetry   []  Cardiology/Vascular   []  Pathology  []  Old records  []  Other:  Most notable findings include: Elevated BNP      Assessment & Plan   Assessment / Plan     Brief Patient Summary:  Greyson Schofield is a 53 y.o. male who with past with history of heart failure with reduced ejection fraction (10% EF), alcoholic cirrhosis, arthritis, and GERD presented to the ED with complaints of chest pain and shortness of breath    Active Hospital Problems:  Active Hospital Problems    Diagnosis    • **CHF exacerbation    • Cirrhosis of liver    • Portal hypertension    • Ascites due to alcoholic cirrhosis    • Acute on chronic combined systolic and diastolic congestive heart failure      Plan:     CHF exacerbation  -Admit to telemetry  -Patient with severely reduced ejection fraction of 10%  -Chest x-ray reviewed  -BNP above baseline  -Troponin elevated, (chronic).  Will trend.  Heparin drip if warranted  -Digoxin Level  -We will diurese with IV Lasix 40 mg twice daily Titrate as needed  -Resume home CHF meds  -Strict input output  -1.5 L fluid restriction  -Daily weights  -LifeVest  -Supplemental oxygen as needed  -Echocardiogram reviewed   -We will consult cardiology  -Supportive care    Cirrhosis  -Mild/Moderate Ascites  -Resume home diuretics  -Outpatient follow up  -Paracentesis if warranted    Abdominal Pain  -Patient with recent paracentesis  -Lipase mildly elevated   -CRP  -CT abdomen if warranted      GI ppx  DVT ppx    DVT prophylaxis:  Medical DVT prophylaxis orders are present.    CODE STATUS:  Full Code     Admission Status:  I believe this patient meets inpatient status.      Electronically signed by Vicente  MD Lydia, 09/10/23, 4:29 AM EDT.

## 2023-09-10 NOTE — PLAN OF CARE
Goal Outcome Evaluation: pt had no c/o cp this shift.  Good urine output with lasix iv.  Encouraged to wear life vest, pt wore it part of the day.  Will monitor.

## 2023-09-10 NOTE — ED PROVIDER NOTES
"Time: 12:44 AM EDT  Date of encounter:  9/10/2023  Independent Historian/Clinical History and Information was obtained by:   Patient and Family    History is limited by: N/A    Chief Complaint: Chest pain      History of Present Illness:  Patient is a 53 y.o. year old male who presents to the emergency department for evaluation of chest pain    Patient states he was sitting on the couch watching television prior to arrival when he began to have discomfort in his right chest that seem to radiate up his right neck and right side of his head.  He states it felt somewhat as a lightning like sensation.  It lasted several minutes and abated spontaneously.  He feels normal at this time.  He and family are concerned as he was recently diagnosed with severe congestive heart failure during his most recent hospitalization and outfitted with a \"LifeVest\" which she was not wearing at the time as he was relaxing on the couch.    Patient states he has gained 2 pounds each of the last 3 days.  He feels as though his abdomen is becoming increasingly distended.  He has increasing dyspnea on exertion.  He has several medication changes after his heart failure clinic visit and recently started Verquvo yesterday    HPI    Patient Care Team  Primary Care Provider: Bertha Doe DO    Past Medical History:     No Known Allergies  Past Medical History:   Diagnosis Date    Arthritis     CHF (congestive heart failure)     Cirrhosis      Past Surgical History:   Procedure Laterality Date    APPENDECTOMY      CARDIAC CATHETERIZATION N/A 8/25/2023    Procedure: Left Heart Cath with possible coronary angioplasty;  Surgeon: Jomar Lynch MD;  Location: [facility] CATH INVASIVE LOCATION;  Service: Cardiology;  Laterality: N/A;     History reviewed. No pertinent family history.    Home Medications:  Prior to Admission medications    Medication Sig Start Date End Date Taking? Authorizing Provider   B Complex Vitamins (B COMPLEX 1 PO) Take 1 tablet " by mouth Daily.    Provider, MD Gertrudis   dapagliflozin Propanediol (Farxiga) 10 MG tablet Take 1 tablet by mouth Daily for 30 days. 8/31/23 9/30/23  Geo Mclean MD   digoxin (LANOXIN) 250 MCG tablet Take 1 tablet by mouth Daily. 9/5/23   Lani Galeas MD   hydrOXYzine pamoate (Vistaril) 25 MG capsule Take 1 capsule by mouth 3 (Three) Times a Day As Needed for Itching for up to 7 days. 8/31/23 9/7/23  Geo Mclean MD   levocetirizine (XYZAL) 5 MG tablet Take 1 tablet by mouth Every Evening.    Provider, MD Gertrudis   levothyroxine (SYNTHROID, LEVOTHROID) 100 MCG tablet Take 1 tablet by mouth Every Morning for 30 days. 9/1/23 10/1/23  Geo Mclean MD   metoprolol succinate XL (TOPROL-XL) 25 MG 24 hr tablet Take 1 tablet by mouth Every Night. 9/5/23   Lani Galeas MD   multivitamin with minerals tablet tablet Take 1 tablet by mouth Daily for 30 days. 8/31/23 9/30/23  Geo Mclean MD   spironolactone (ALDACTONE) 25 MG tablet Take 1 tablet by mouth Daily for 30 days. 8/31/23 9/30/23  Geo Mclean MD   thiamine (VITAMIN B-1) 100 MG tablet Take 1 tablet by mouth Daily for 30 days. 9/1/23 10/1/23  Geo Mclean MD   Vericiguat (Verquvo) 2.5 MG tablet Take 1 tablet by mouth Daily. 9/5/23   Lani Galeas MD   Vericiguat (Verquvo) 2.5 MG tablet Take 1 tablet by mouth Daily. Indications: [id number] exp 2/21/2025 9/5/23   Lani Galeas MD        Social History:   Social History     Tobacco Use    Smoking status: Every Day     Packs/day: 0.50     Years: 33.00     Pack years: 16.50     Types: Cigarettes     Start date: 10/1993    Smokeless tobacco: Current     Types: Snuff   Vaping Use    Vaping Use: Never used   Substance Use Topics    Alcohol use: Not Currently     Comment: history of alcohol abuse    Drug use: Not Currently     Types: Marijuana, Cocaine(coke)         Review of Systems:  Review of Systems   Constitutional:  Positive for unexpected weight change. Negative for chills and  "fever.   HENT:  Negative for congestion, ear pain and sore throat.    Eyes:  Negative for pain.   Respiratory:  Negative for cough, chest tightness and shortness of breath.    Cardiovascular:  Positive for leg swelling. Negative for chest pain.   Gastrointestinal:  Positive for abdominal distention and abdominal pain. Negative for diarrhea, nausea and vomiting.   Genitourinary:  Negative for flank pain and hematuria.   Musculoskeletal:  Negative for joint swelling.   Skin:  Negative for pallor.   Neurological:  Negative for seizures and headaches.   All other systems reviewed and are negative.     Physical Exam:  /65 (BP Location: Right arm, Patient Position: Lying)   Pulse 73   Temp 97.5 °F (36.4 °C) (Oral)   Resp 16   Ht 165.1 cm (65\")   Wt 72.4 kg (159 lb 9.8 oz)   SpO2 94%   BMI 26.56 kg/m²     Physical Exam  Vitals and nursing note reviewed.   Constitutional:       General: He is not in acute distress.     Appearance: Normal appearance. He is not toxic-appearing.      Comments: Frail and ill-appearing   HENT:      Head: Normocephalic and atraumatic.      Jaw: There is normal jaw occlusion.   Eyes:      General: Lids are normal.      Extraocular Movements: Extraocular movements intact.      Conjunctiva/sclera: Conjunctivae normal.      Pupils: Pupils are equal, round, and reactive to light.   Cardiovascular:      Rate and Rhythm: Normal rate and regular rhythm.      Pulses: Normal pulses.      Heart sounds: Normal heart sounds.   Pulmonary:      Effort: Pulmonary effort is normal. No respiratory distress.      Breath sounds: Normal breath sounds. No wheezing or rhonchi.   Abdominal:      General: Abdomen is flat.      Palpations: Abdomen is soft.      Tenderness: There is no abdominal tenderness. There is no guarding or rebound.      Comments: Abdomen is distended but has no focal tenderness.   Musculoskeletal:         General: Normal range of motion.      Cervical back: Normal range of motion and " neck supple.      Right lower leg: Edema present.      Left lower leg: Edema present.      Comments: 2-3+ pitting edema in the bilateral lower extremities extending up to the knee.   Skin:     General: Skin is warm and dry.   Neurological:      Mental Status: He is alert and oriented to person, place, and time. Mental status is at baseline.   Psychiatric:         Mood and Affect: Mood normal.              Procedures:  Procedures      Medical Decision Making:      Comorbidities that affect care:    Congestive heart failure, nicotine dependence, portal hypertension    External Notes reviewed:    Previous Clinic Note: Heart failure clinic visit September 5, 2023      The following orders were placed and all results were independently analyzed by me:  Orders Placed This Encounter   Procedures    XR Chest 1 View    [facility] Draw    High Sensitivity Troponin T    Comprehensive Metabolic Panel    Lipase    BNP    Magnesium    CBC Auto Differential    High Sensitivity Troponin T 2Hr    Digoxin Level    High Sensitivity Troponin T    C-reactive Protein    Diet: Cardiac Diets, Fluid Restriction (240 mL/tray) Diets; Low Sodium (2g); 1500 mL/day; Texture: Regular Texture (IDDSI 7); Fluid Consistency: Thin (IDDSI 0)    Undress & Gown    Continuous Pulse Oximetry    Vital Signs Every 15 Minutes Until Stable, Then Every 4 Hours    Telemetry - Place Orders & Notify Provider of Results When Patient Experiences Acute Chest Pain, Dysrhythmia or Respiratory Distress    Notify Physician For Unrelieved Chest Pain    Saline Lock & Maintain IV Access    Daily Weights    Strict Intake & Output    Hospitalist (on-call MD unless specified)    Inpatient Cardiology Consult    Inpatient Case Management  Consult    PT Consult: Eval & Treat Functional Mobility Below Baseline    Oxygen Therapy- Nasal Cannula; Titrate 1-6 LPM Per SpO2; 90 - 95%    ECG 12 Lead ED Triage Standing Order; Chest Pain    ECG 12 Lead ED Triage Standing Order;  Chest Pain    ECG 12 Lead Chest Pain    ECG 12 Lead Chest Pain    Insert Peripheral IV    Insert Peripheral IV    Inpatient Admission    CBC & Differential    Green Top (Gel)    Lavender Top    Gold Top - SST    Light Blue Top       Medications Given in the Emergency Department:  Medications   sodium chloride 0.9 % flush 10 mL (has no administration in time range)   nitroglycerin (NITROSTAT) SL tablet 0.4 mg (has no administration in time range)   sodium chloride 0.9 % flush 10 mL (has no administration in time range)   sodium chloride 0.9 % flush 10 mL (has no administration in time range)   sodium chloride 0.9 % infusion 40 mL (has no administration in time range)   sennosides-docusate (PERICOLACE) 8.6-50 MG per tablet 2 tablet (has no administration in time range)     And   polyethylene glycol (MIRALAX) packet 17 g (has no administration in time range)     And   bisacodyl (DULCOLAX) EC tablet 5 mg (has no administration in time range)     And   bisacodyl (DULCOLAX) suppository 10 mg (has no administration in time range)   Enoxaparin Sodium (LOVENOX) syringe 40 mg (has no administration in time range)   aspirin EC tablet 81 mg (has no administration in time range)   ondansetron (ZOFRAN) injection 4 mg (has no administration in time range)   famotidine (PEPCID) tablet 40 mg (has no administration in time range)   temazepam (RESTORIL) capsule 15 mg (has no administration in time range)   furosemide (LASIX) injection 40 mg (has no administration in time range)   aspirin chewable tablet 324 mg (324 mg Oral Given 9/10/23 0113)   furosemide (LASIX) injection 80 mg (80 mg Intravenous Given 9/10/23 0202)        ED Course:    ED Course as of 09/10/23 0717   Sun Sep 10, 2023   0048 My interpretation of EKG: Sinus rhythm 78, no acute ischemia, evidence of old anterior and inferior infarct [JS]      ED Course User Index  [JS] Kobi Morales MD       Labs:    Lab Results (last 24 hours)       Procedure Component Value Units  Date/Time    High Sensitivity Troponin T [[id number]]  (Abnormal) Collected: 09/10/23 0043    Specimen: Blood Updated: 09/10/23 0128     HS Troponin T 78 ng/L     Narrative:      High Sensitive Troponin T Reference Range:  <10.0 ng/L- Negative Female for AMI  <15.0 ng/L- Negative Male for AMI  >=10 - Abnormal Female indicating possible myocardial injury.  >=15 - Abnormal Male indicating possible myocardial injury.   Clinicians would have to utilize clinical acumen, EKG, Troponin, and serial changes to determine if it is an Acute Myocardial Infarction or myocardial injury due to an underlying chronic condition.         CBC & Differential [[id number]]  (Abnormal) Collected: 09/10/23 0043    Specimen: Blood Updated: 09/10/23 0057    Narrative:      The following orders were created for panel order CBC & Differential.  Procedure                               Abnormality         Status                     ---------                               -----------         ------                     CBC Auto Differential[[id number]]        Abnormal            Final result                 Please view results for these tests on the individual orders.    Comprehensive Metabolic Panel [[id number]]  (Abnormal) Collected: 09/10/23 0043    Specimen: Blood Updated: 09/10/23 0118     Glucose 86 mg/dL      BUN 15 mg/dL      Creatinine 1.11 mg/dL      Sodium 134 mmol/L      Potassium 4.5 mmol/L      Chloride 97 mmol/L      CO2 26.2 mmol/L      Calcium 9.7 mg/dL      Total Protein 8.3 g/dL      Albumin 5.1 g/dL      ALT (SGPT) 32 U/L      AST (SGOT) 52 U/L      Alkaline Phosphatase 106 U/L      Total Bilirubin 0.3 mg/dL      Globulin 3.2 gm/dL      A/G Ratio 1.6 g/dL      BUN/Creatinine Ratio 13.5     Anion Gap 10.8 mmol/L      eGFR 80.4 mL/min/1.73     Narrative:      GFR Normal >60  Chronic Kidney Disease <60  Kidney Failure <15      Lipase [[id number]]  (Abnormal) Collected: 09/10/23 0043    Specimen: Blood Updated: 09/10/23 0118     Lipase  275 U/L     BNP [[id number]]  (Abnormal) Collected: 09/10/23 0043    Specimen: Blood Updated: 09/10/23 0117     proBNP 3,108.0 pg/mL     Narrative:      Among patients with dyspnea, NT-proBNP is highly sensitive for the detection of acute congestive heart failure. In addition NT-proBNP of <300 pg/ml effectively rules out acute congestive heart failure with 99% negative predictive value.      Magnesium [[id number]]  (Normal) Collected: 09/10/23 0043    Specimen: Blood Updated: 09/10/23 0118     Magnesium 2.1 mg/dL     CBC Auto Differential [[id number]]  (Abnormal) Collected: 09/10/23 0043    Specimen: Blood Updated: 09/10/23 0057     WBC 5.67 10*3/mm3      RBC 3.06 10*6/mm3      Hemoglobin 10.8 g/dL      Hematocrit 31.3 %      .3 fL      MCH 35.3 pg      MCHC 34.5 g/dL      RDW 14.9 %      RDW-SD 56.3 fl      MPV 8.5 fL      Platelets 361 10*3/mm3      Neutrophil % 51.1 %      Lymphocyte % 32.6 %      Monocyte % 9.9 %      Eosinophil % 4.4 %      Basophil % 1.8 %      Immature Grans % 0.2 %      Neutrophils, Absolute 2.90 10*3/mm3      Lymphocytes, Absolute 1.85 10*3/mm3      Monocytes, Absolute 0.56 10*3/mm3      Eosinophils, Absolute 0.25 10*3/mm3      Basophils, Absolute 0.10 10*3/mm3      Immature Grans, Absolute 0.01 10*3/mm3      nRBC 0.0 /100 WBC     Digoxin Level [[id number]]  (Normal) Collected: 09/10/23 0043    Specimen: Blood Updated: 09/10/23 0152     Digoxin 0.94 ng/mL     High Sensitivity Troponin T 2Hr [[id number]]  (Abnormal) Collected: 09/10/23 0249    Specimen: Blood Updated: 09/10/23 0333     HS Troponin T 74 ng/L      Troponin T Delta -4 ng/L     Narrative:      High Sensitive Troponin T Reference Range:  <10.0 ng/L- Negative Female for AMI  <15.0 ng/L- Negative Male for AMI  >=10 - Abnormal Female indicating possible myocardial injury.  >=15 - Abnormal Male indicating possible myocardial injury.   Clinicians would have to utilize clinical acumen, EKG, Troponin, and serial changes to determine  if it is an Acute Myocardial Infarction or myocardial injury due to an underlying chronic condition.         C-reactive Protein [[id number]]  (Normal) Collected: 09/10/23 0249    Specimen: Blood Updated: 09/10/23 0657     C-Reactive Protein 0.48 mg/dL              Imaging:    XR Chest 1 View    Result Date: 9/10/2023  PROCEDURE: XR CHEST 1 VW  COMPARISON: [facility], CR, XR CHEST 1 VW, 8/28/2023, 14:56.  INDICATIONS: Chest Pain  FINDINGS:  There is mild interval increase in diffuse interstitial prominence bilaterally.  There also appear to be increasing mild patchy infiltrates within the lower lungs.  Cardiomegaly is noted.  The mediastinum is stable.  No acute osseous abnormalities are identified.        1. Findings most suggestive of developing mild pulmonary edema/CHF.  Developing atypical/viral infection or multifocal pneumonia is felt less likely. 2. Stable cardiomegaly.       LUI DAUGHERTY MD       Electronically Signed and Approved By: LUI DAUGHERTY MD on 9/10/2023 at 0:57                Differential Diagnosis and Discussion:    Chest Pain:  Based on the patient's signs and symptoms, I considered aortic dissection, myocardial infaction, pulmonary embolism, cardiac tamponade, pericarditis, pneumothorax, musculoskeletal chest pain and other differential diagnosis as an etiology of the patient's chest pain.   Dyspnea: Differential diagnosis includes but is not limited to metabolic acidosis, neurological disorders, psychogenic, asthma, pneumothorax, upper airway obstruction, COPD, pneumonia, noncardiogenic pulmonary edema, interstitial lung disease, anemia, congestive heart failure, and pulmonary embolism    All labs were reviewed and interpreted by me.  All X-rays impressions were independently interpreted by me.  EKG was interpreted by me.    MDM     Amount and/or Complexity of Data Reviewed  Decide to obtain previous medical records or to obtain history from someone other than the patient:  yes             Patient Care Considerations:    ANTIBIOTICS: I considered prescribing antibiotics as an outpatient however no evidence of bacterial infection.      Consultants/Shared Management Plan:    Hospitalist: I have discussed the case with the hospitalist who agrees to accept the patient for admission.    Social Determinants of Health:    Patient has presented with family members who are responsible, reliable and will ensure follow up care.      Disposition and Care Coordination:    Admit:   Through independent evaluation of the patient's history, physical, and imperical data, the patient meets criteria for observation/admission to the hospital.        Final diagnoses:   Acute congestive heart failure, unspecified heart failure type   Acute pulmonary edema   Pedal edema        ED Disposition       ED Disposition   Decision to Admit    Condition   --    Comment   Level of Care: Telemetry [5]   Diagnosis: CHF exacerbation [805403]   Admitting Physician: ROGER CARLSON [[id number]]   Certification: I Certify That Inpatient Hospital Services Are Medically Necessary For Greater Than 2 Midnights                 This medical record created using voice recognition software.             Kobi Morales MD  09/10/23 0717

## 2023-09-10 NOTE — CONSULTS
[facility]   Cardiology Consult Note    Patient Name: Greyson Schofield  : 1970  MRN: [id number]  Primary Care Physician:  Bertha Doe DO  Primary Cardiologist: Jomar Lynch MD  Referring Physician: Vicente Freeman MD    Date of admission: 9/10/2023    Subjective   Subjective     Reason for Consultation : Chest pain, shortness of breath, heart failure exacerbation    Chief Complaint : Shortness of breath and chest pain    HPI:  Greyson Schofield is a 53 y.o. male with dilated cardiomyopathy, alcoholic cirrhosis, GERD, arthritis.      Patient was recently discharged from the hospital after being diagnosed and treated for heart failure.  He felt well at the time of discharge on 2022.  He was seen in heart failure clinic on 2022 and was started on digoxin.  Verquivo was started yesterday per recommendations.  Around 10 PM he was watching TV, when suddenly felt a sharp pain on the right side of the chest with radiation to the right arm.  He also felt severely short of breath and fatigue.  He also felt severely nauseated and somewhat diaphoretic.  Symptoms lasted for several minutes and subsided by itself.  He also reported slow weight gain for the past 3 days and noted a significant increase in his swelling as well.  He was discharged home on LifeVest on 2023, however he is wearing it only during the day.  At the time of this episode, he was not wearing the LifeVest.    Patient was feeling much better on arrival to emergency room.  He was noted to be volume overloaded.  Labs showed elevated proBNP and mildly elevated but flat high-sensitivity troponin.  Chest x-ray showed pulmonary vascular congestion.  He was given IV Lasix and was admitted to hospitalist service.  Cardiology is consulted for further care.    Review of Systems   All systems were reviewed and negative except for: Chest pain, shortness of breath, nausea, fatigue, weight gain and swelling of the feet.  Negative for  palpitations.    Personal History     Past Medical History:   Diagnosis Date   • Arthritis    • CHF (congestive heart failure)    • Cirrhosis           Family History: Reviewed, no family history of premature coronary artery disease    Social History:  reports that he has been smoking cigarettes. He started smoking about 29 years ago. He has a 16.50 pack-year smoking history. His smokeless tobacco use includes snuff. He reports that he does not currently use alcohol. He reports that he does not currently use drugs after having used the following drugs: Marijuana and Cocaine(coke).    Home Medications:  B Complex Vitamins, Thiamine Mononitrate, Vericiguat, dapagliflozin Propanediol, digoxin, hydrOXYzine pamoate, levocetirizine, levothyroxine, metoprolol succinate XL, multivitamin with minerals, and spironolactone    Allergies:  No Known Allergies    Objective    Objective     Vitals:   Temp:  [97.5 °F (36.4 °C)-97.6 °F (36.4 °C)] 97.5 °F (36.4 °C)  Heart Rate:  [70-91] 73  Resp:  [16-18] 16  BP: (100-116)/(65-80) 103/65      Physical Exam:   Constitutional: Awake, alert, No acute distress    Eyes: PERRLA, sclerae anicteric, no conjunctival injection   HENT: NCAT, mucous membranes moist   Neck: Supple, no thyromegaly, no lymphadenopathy, trachea midline   Respiratory: Bilateral basal crackles heard, no wheezing   Cardiovascular: RRR, no murmurs, rubs, or gallops, palpable pedal pulses bilaterally   Gastrointestinal: Positive bowel sounds, soft, nontender, abdomen distended, free fluid present   Musculoskeletal: 1+ pitting edema bilaterally, no clubbing or cyanosis to extremities   Psychiatric: Appropriate affect, cooperative   Neurologic: Oriented x 3, strength symmetric in all extremities, speech clear   Skin: No rashes     Result Review    Result Review:  I have personally reviewed the results from the time of this admission to 9/10/2023 09:07 EDT and agree with these findings:  [x]  Laboratory  []   Microbiology  [x]  Radiology  [x]  EKG/Telemetry   [x]  Cardiology/Vascular   []  Pathology  [x]  Old records  []  Other:  Most notable findings include:     CMP          8/29/2023    05:40 8/31/2023    00:55 8/31/2023    04:31 9/10/2023    00:43   CMP   Glucose 90   81  86    BUN 14   22  15    Creatinine 1.14   1.16  1.11    EGFR 77.9   76.3  80.4    Sodium 127   130  134    Potassium 3.7   3.8  4.5    Chloride 91   95  97    Calcium 8.8   8.8  9.7    Total Protein 6.9    8.3    Albumin 4.6  4.1  4.4  5.1    Globulin    3.2    Total Bilirubin 0.5    0.3    Alkaline Phosphatase 75    106    AST (SGOT) 46    52    ALT (SGPT) 13    32    Albumin/Globulin Ratio    1.6    BUN/Creatinine Ratio 12.3   19.0  13.5    Anion Gap 9.0   9.8  10.8       CBC          8/28/2023    04:28 8/29/2023    05:40 9/10/2023    00:43   CBC   WBC 6.30  5.77  5.67    RBC 3.31  3.18  3.06    Hemoglobin 11.4  10.9  10.8    Hematocrit 33.7  32.6  31.3    .8  102.5  102.3    MCH 34.4  34.3  35.3    MCHC 33.8  33.4  34.5    RDW 15.3  15.3  14.9    Platelets 351  331  361          Latest Reference Range & Units 09/10/23 00:43 09/10/23 02:49 09/10/23 07:51   HS Troponin T <15 ng/L 78 (C) 74 (C) 76 (C)   Troponin T Delta >=-4 - <+4 ng/L  -4 (L)    proBNP 0.0 - 900.0 pg/mL 3,108.0 (H)       Results for orders placed during the hospital encounter of 08/22/23    Adult Transthoracic Echo Complete W/ Cont if Necessary Per Protocol    Interpretation Summary  •  The left ventricular cavity is severely dilated.  •  Left ventricular systolic function is severely decreased. Calculated left ventricular EF = 10.3%  •  Left ventricular diastolic function is consistent with (grade II w/high LAP) pseudonormalization.  •  Mildly reduced right ventricular systolic function noted.  •  The left atrial cavity is severely dilated.  •  Estimated right ventricular systolic pressure from tricuspid regurgitation is normal (<35 mmHg).  •  There is a small (<1cm)  pericardial effusion posteriorly.       Assessment & Plan   Assessment / Plan     Brief Patient Summary:  Greyson Schofield is a 53 y.o. male with chronic combined heart failure, dilated cardiomyopathy, cirrhosis of liver with portal hypertension, and tobacco use.  He presented to the emergency room because of chest pain, shortness of breath, pedal edema and weight gain.  He is in heart failure exacerbation.    Active Hospital Problems:  Active Hospital Problems    Diagnosis    • **CHF exacerbation    • Cirrhosis of liver    • Portal hypertension    • Ascites due to alcoholic cirrhosis    • Acute on chronic combined systolic and diastolic congestive heart failure      Acute on chronic combined heart failure : Recent diagnosis, discharge from the hospital 10 days back.  [place] fine on discharge.  Recently started on digoxin and Verqvio as outpatient.  He is currently volume overloaded with significant pulmonary edema on auscultation.  proBNP elevated.    Chest pain : He has known coronary artery disease per recent Cardiac catheterization.  Chest pain happened suddenly and subsided without treatment.  There is possibility of arrhythmia causing this, he was not wearing LifeVest at the time of the episode    Elevated troponin : Type II MI related to heart failure exacerbation    Cirrhosis of liver with portal hypertension : He has ascites on examination    Tobacco use    Plan:     We will continue IV Lasix 40 mg twice daily with close monitoring of electrolytes and renal function    Restart home spironolactone  Restart home metoprolol    Holding digoxin and Verquio for now    Fluid restriction 1500 ml per day low-sodium diet  Repeat labs a.m.    Further management plans based on clinical course and test results  Management plan extensively discussed with the patient and son, who was at bedside      Electronically signed by Kobi Courtney MD, 09/10/23, 9:07 AM EDT.

## 2023-09-11 ENCOUNTER — READMISSION MANAGEMENT (OUTPATIENT)
Dept: CALL CENTER | Facility: HOSPITAL | Age: 53
End: 2023-09-11
Payer: COMMERCIAL

## 2023-09-11 VITALS
OXYGEN SATURATION: 99 % | DIASTOLIC BLOOD PRESSURE: 66 MMHG | SYSTOLIC BLOOD PRESSURE: 102 MMHG | HEART RATE: 81 BPM | RESPIRATION RATE: 16 BRPM | HEIGHT: 65 IN | BODY MASS INDEX: 26.59 KG/M2 | WEIGHT: 159.61 LBS | TEMPERATURE: 98.3 F

## 2023-09-11 PROBLEM — I50.23 ACUTE ON CHRONIC HFREF (HEART FAILURE WITH REDUCED EJECTION FRACTION): Status: ACTIVE | Noted: 2023-09-11

## 2023-09-11 LAB
ANION GAP SERPL CALCULATED.3IONS-SCNC: 11.6 MMOL/L (ref 5–15)
BUN SERPL-MCNC: 23 MG/DL (ref 6–20)
BUN/CREAT SERPL: 21.1 (ref 7–25)
CALCIUM SPEC-SCNC: 9.5 MG/DL (ref 8.6–10.5)
CHLORIDE SERPL-SCNC: 91 MMOL/L (ref 98–107)
CO2 SERPL-SCNC: 29.4 MMOL/L (ref 22–29)
CREAT SERPL-MCNC: 1.09 MG/DL (ref 0.76–1.27)
EGFRCR SERPLBLD CKD-EPI 2021: 82.2 ML/MIN/1.73
GLUCOSE SERPL-MCNC: 84 MG/DL (ref 65–99)
MAGNESIUM SERPL-MCNC: 2.1 MG/DL (ref 1.6–2.6)
POTASSIUM SERPL-SCNC: 3.6 MMOL/L (ref 3.5–5.2)
SODIUM SERPL-SCNC: 132 MMOL/L (ref 136–145)

## 2023-09-11 PROCEDURE — 25010000002 FUROSEMIDE PER 20 MG: Performed by: INTERNAL MEDICINE

## 2023-09-11 PROCEDURE — 80048 BASIC METABOLIC PNL TOTAL CA: CPT | Performed by: INTERNAL MEDICINE

## 2023-09-11 PROCEDURE — 96376 TX/PRO/DX INJ SAME DRUG ADON: CPT

## 2023-09-11 PROCEDURE — 83735 ASSAY OF MAGNESIUM: CPT | Performed by: INTERNAL MEDICINE

## 2023-09-11 PROCEDURE — 25010000002 ENOXAPARIN PER 10 MG: Performed by: FAMILY MEDICINE

## 2023-09-11 PROCEDURE — 96372 THER/PROPH/DIAG INJ SC/IM: CPT

## 2023-09-11 PROCEDURE — 97161 PT EVAL LOW COMPLEX 20 MIN: CPT

## 2023-09-11 PROCEDURE — 99233 SBSQ HOSP IP/OBS HIGH 50: CPT | Performed by: INTERNAL MEDICINE

## 2023-09-11 RX ORDER — ACETAMINOPHEN 325 MG/1
650 TABLET ORAL EVERY 6 HOURS PRN
Status: DISCONTINUED | OUTPATIENT
Start: 2023-09-11 | End: 2023-09-11 | Stop reason: HOSPADM

## 2023-09-11 RX ORDER — LORAZEPAM 0.5 MG/1
0.5 TABLET ORAL ONCE
Status: COMPLETED | OUTPATIENT
Start: 2023-09-11 | End: 2023-09-11

## 2023-09-11 RX ORDER — ASPIRIN 81 MG/1
81 TABLET ORAL DAILY
Qty: 30 TABLET | Refills: 0 | Status: SHIPPED | OUTPATIENT
Start: 2023-09-12 | End: 2023-10-12

## 2023-09-11 RX ORDER — FUROSEMIDE 40 MG/1
40 TABLET ORAL DAILY
Qty: 30 TABLET | Refills: 0 | Status: SHIPPED | OUTPATIENT
Start: 2023-09-12 | End: 2023-10-12

## 2023-09-11 RX ORDER — FUROSEMIDE 40 MG/1
40 TABLET ORAL DAILY
Status: DISCONTINUED | OUTPATIENT
Start: 2023-09-11 | End: 2023-09-11 | Stop reason: HOSPADM

## 2023-09-11 RX ORDER — SACUBITRIL AND VALSARTAN 24; 26 MG/1; MG/1
1 TABLET, FILM COATED ORAL 2 TIMES DAILY
Qty: 60 TABLET | Refills: 0 | Status: SHIPPED | OUTPATIENT
Start: 2023-09-11

## 2023-09-11 RX ADMIN — SPIRONOLACTONE 25 MG: 25 TABLET ORAL at 09:01

## 2023-09-11 RX ADMIN — FUROSEMIDE 40 MG: 40 TABLET ORAL at 14:43

## 2023-09-11 RX ADMIN — LEVOTHYROXINE SODIUM 100 MCG: 0.1 TABLET ORAL at 05:20

## 2023-09-11 RX ADMIN — ACETAMINOPHEN 650 MG: 325 TABLET ORAL at 12:43

## 2023-09-11 RX ADMIN — ASPIRIN 81 MG: 81 TABLET, COATED ORAL at 09:01

## 2023-09-11 RX ADMIN — LORAZEPAM 0.5 MG: 0.5 TABLET ORAL at 16:18

## 2023-09-11 RX ADMIN — ENOXAPARIN SODIUM 40 MG: 100 INJECTION SUBCUTANEOUS at 08:59

## 2023-09-11 RX ADMIN — EMPAGLIFLOZIN 10 MG: 10 TABLET, FILM COATED ORAL at 09:01

## 2023-09-11 RX ADMIN — FUROSEMIDE 40 MG: 10 INJECTION, SOLUTION INTRAMUSCULAR; INTRAVENOUS at 09:00

## 2023-09-11 RX ADMIN — FAMOTIDINE 40 MG: 20 TABLET ORAL at 09:01

## 2023-09-11 RX ADMIN — Medication 10 ML: at 09:00

## 2023-09-11 NOTE — OUTREACH NOTE
Prep Survey      Flowsheet Row Responses   [facility] facility patient discharged from? [facility]   Is LACE score < 7 ? No   Eligibility [facility]   Hospital [facility]   Date of Admission 09/10/23   Date of Discharge 09/11/23   Discharge Disposition Home or Self Care   Discharge diagnosis A/C CHF, severe dilated cardiomyopathy   Does the patient have one of the following disease processes/diagnoses(primary or secondary)? CHF   Does the patient have Home health ordered? No   Is there a DME ordered? Yes   What DME was ordered? cane ordered   Prep survey completed? Yes            Dena SOMMER - Registered Nurse

## 2023-09-11 NOTE — PLAN OF CARE
Goal Outcome Evaluation:  Plan of Care Reviewed With: (P) patient           Outcome Evaluation: (P) Pt demonstrates functional LE strength bilaterally and good balance. He has no issues getting in and out of bed independently and can tolerate walking with no LOB or excess fatigue. This pt does not demonstrate a need for phsyical therapy services at this time.      Anticipated Discharge Disposition (PT): (P) home

## 2023-09-11 NOTE — OUTREACH NOTE
CHF Week 2 Survey      Flowsheet Row Responses   [facility] [place] facility patient discharged from? [facility]   Does the patient have one of the following disease processes/diagnoses(primary or secondary)? CHF   Week 2 attempt successful? No   Unsuccessful attempts Attempt 1   Revoke Readmitted            Dena SOMMER - Registered Nurse

## 2023-09-11 NOTE — PLAN OF CARE
Goal Outcome Evaluation:   Pt dc home with follow up appointments and dc instructions.

## 2023-09-11 NOTE — PROGRESS NOTES
[facility]     Cardiology Progress Note    Patient Name: Greyson Schofield  : 1970  MRN: [id number]  Primary Care Physician:  Bertha Doe DO  Date of admission: 9/10/2023    Subjective   Subjective     Patient feels much better today.  He is eager to go home.  He has no chest discomfort, significant dyspnea or other complaints.    Review of Systems   All systems were reviewed and negative except for: As above    Objective   Objective     Vitals:   Temp:  [98.2 °F (36.8 °C)-98.6 °F (37 °C)] 98.2 °F (36.8 °C)  Heart Rate:  [75-90] 90  Resp:  [16-18] 18  BP: (80-94)/(46-63) 94/63  Physical Exam                  Constitutional: Awake, alert, No acute distress               Eyes: PERRLA, sclerae anicteric, no conjunctival injection              HENT: NCAT, mucous membranes moist              Neck: Supple, no thyromegaly, no lymphadenopathy, trachea midline              Respiratory: Clear to auscultation bilaterally, nonlabored respirations               Cardiovascular: RRR, no murmurs, rubs, or gallops, palpable pedal pulses bilaterally              Gastrointestinal: Positive bowel sounds, soft, nontender, mildly distended              Musculoskeletal: trace bilateral leg edema, no clubbing or cyanosis to extremities              Psychiatric: Appropriate affect, cooperative              Neurologic: Oriented x 3, strength symmetric in all extremities, Cranial Nerves grossly intact to confrontation, speech clear              Skin: No rashes     Scheduled Meds:aspirin, 81 mg, Oral, Daily  empagliflozin, 10 mg, Oral, Daily  enoxaparin, 40 mg, Subcutaneous, Daily  famotidine, 40 mg, Oral, Daily  furosemide, 40 mg, Oral, Daily  levothyroxine, 100 mcg, Oral, Q AM  metoprolol succinate XL, 25 mg, Oral, Nightly  senna-docusate sodium, 2 tablet, Oral, BID  sodium chloride, 10 mL, Intravenous, Q12H  spironolactone, 25 mg, Oral, Daily      Continuous Infusions:        Result Review    Result Review:  I have personally  reviewed the results from the time of this admission to 9/11/2023 12:53 EDT and agree with these findings:  [x]  Laboratory  []  Microbiology  [x]  Radiology  [x]  EKG/Telemetry   [x]  Cardiology/Vascular   []  Pathology  []  Old records  []  Other:  Most notable findings include:     CBC          8/28/2023    04:28 8/29/2023    05:40 9/10/2023    00:43   CBC   WBC 6.30  5.77  5.67    RBC 3.31  3.18  3.06    Hemoglobin 11.4  10.9  10.8    Hematocrit 33.7  32.6  31.3    .8  102.5  102.3    MCH 34.4  34.3  35.3    MCHC 33.8  33.4  34.5    RDW 15.3  15.3  14.9    Platelets 351  331  361      CMP          8/31/2023    00:55 8/31/2023    04:31 9/10/2023    00:43 9/11/2023    05:16   CMP   Glucose  81  86  84    BUN  22  15  23    Creatinine  1.16  1.11  1.09    EGFR  76.3  80.4  82.2    Sodium  130  134  132    Potassium  3.8  4.5  3.6    Chloride  95  97  91    Calcium  8.8  9.7  9.5    Total Protein   8.3     Albumin 4.1  4.4  5.1     Globulin   3.2     Total Bilirubin   0.3     Alkaline Phosphatase   106     AST (SGOT)   52     ALT (SGPT)   32     Albumin/Globulin Ratio   1.6     BUN/Creatinine Ratio  19.0  13.5  21.1    Anion Gap  9.8  10.8  11.6       CARDIAC LABS:      Lab 09/10/23  0751 09/10/23  0249 09/10/23  0043   PROBNP  --   --  3,108.0*   HSTROP T 76* 74* 78*        Assessment & Plan   Assessment / Plan     Brief Patient Summary:  Greyson Schofield is a 53 y.o. male with:     Acute on chronic systolic congestive heart failure exacerbation, recently diagnosed, LVEF around 10 to 15% by echo.   Severe dilated cardiomyopathy, more likely related to alcohol abuse.  Mildly elevated troponin without angina or acute ischemic ST-T changes, more like related to CHF exacerbation.  Hyponatremia, Na 132  History of traumatic brain injury 7 years ago.  Alcohol abuse. Quit 2-3 weeks ago.   Active smoking. Recently quit.   Ascites     Plan:   Patient with residual lower extremity swelling without other signs of fluid  overload by exam.  His blood pressure has been labile.  IV Lasix will be switched to p.o. form.  Continue metoprolol, Aldactone and Farxiga.  Will start Entresto as an outpatient as blood pressure allows.  He will be referred to advanced heart failure clinic with [facility].    Patient may be discharged from cardiac perspective.  He needs follow-up with us within 1 week after discharge    CODE STATUS:        Electronically signed by Jomar Lynch MD, 09/11/23, 12:53 PM EDT.

## 2023-09-11 NOTE — DISCHARGE SUMMARY
[facility]         HOSPITALIST  DISCHARGE SUMMARY    Patient Name: Greyson Schofield  : 1970  MRN: [id number]    Date of Admission: 9/10/2023  Date of Discharge:  2023   Primary Care Physician: Bertha Doe DO    Consults       Date and Time Order Name Status Description    9/10/2023  3:19 AM Inpatient Cardiology Consult Completed     9/10/2023  1:53 AM Hospitalist (on-call MD unless specified)              Active and Resolved Hospital Problems:  Active Hospital Problems    Diagnosis POA    **CHF exacerbation [I50.9] Yes    Acute on chronic HFrEF (heart failure with reduced ejection fraction) [I50.23] Yes    Cirrhosis of liver [K74.60] Unknown    Portal hypertension [K76.6] Yes    Ascites due to alcoholic cirrhosis [K70.31] Yes    Acute on chronic combined systolic and diastolic congestive heart failure [I50.43] Yes      Resolved Hospital Problems   No resolved problems to display.       Hospital Course     Hospital Course:  Greyson Schofield is a 53 y.o. male ***        DISCHARGE Follow Up Recommendations for labs and diagnostics: ***      Day of Discharge     Vital Signs:  Temp:  [98.2 °F (36.8 °C)-98.6 °F (37 °C)] 98.2 °F (36.8 °C)  Heart Rate:  [75-90] 90  Resp:  [16-18] 18  BP: (80-94)/(46-63) 94/63  Physical Exam: ***      Discharge Details        Discharge Medications        New Medications        Instructions Start Date   aspirin 81 MG EC tablet   81 mg, Oral, Daily   Start Date: 2023     Entresto 24-26 MG tablet  Generic drug: sacubitril-valsartan   1 tablet, Oral, 2 Times Daily      furosemide 40 MG tablet  Commonly known as: LASIX   40 mg, Oral, Daily   Start Date: 2023            Continue These Medications        Instructions Start Date   B COMPLEX 1 PO   1 tablet, Oral, Daily      digoxin 250 MCG tablet  Commonly known as: LANOXIN   250 mcg, Oral, Daily      Farxiga 10 MG tablet  Generic drug: dapagliflozin Propanediol   Take 1 tablet by mouth  Daily for 30 days.      hydrOXYzine pamoate 25 MG capsule  Commonly known as: Vistaril   25 mg, Oral, 3 Times Daily PRN      levocetirizine 5 MG tablet  Commonly known as: XYZAL   5 mg, Oral, Every Evening      levothyroxine 100 MCG tablet  Commonly known as: SYNTHROID, LEVOTHROID   100 mcg, Oral, Every Early Morning      metoprolol succinate XL 25 MG 24 hr tablet  Commonly known as: TOPROL-XL   25 mg, Oral, Nightly      multivitamin with minerals tablet tablet   1 tablet, Oral, Daily      spironolactone 25 MG tablet  Commonly known as: ALDACTONE   25 mg, Oral, Daily      Thiamine Mononitrate 100 MG tablet   100 mg, Oral, Daily      Verquvo 2.5 MG tablet  Generic drug: Vericiguat   2.5 mg, Oral, Daily      Verquvo 2.5 MG tablet  Generic drug: Vericiguat   2.5 mg, Oral, Daily               No Known Allergies    Discharge Disposition:  Home or Self Care    Diet:  Hospital:  Diet Order   Procedures    Diet: Cardiac Diets, Fluid Restriction (240 mL/tray) Diets; Low Sodium (2g); 1500 mL/day; Texture: Regular Texture (IDDSI 7); Fluid Consistency: Thin (IDDSI 0)       Discharge Activity:       CODE STATUS:  There are no questions and answers to display.         Future Appointments   Date Time Provider Department Center   9/14/2023  1:00 PM Mirta Stout APRN [facility] None   9/19/2023 10:30 AM Mirta Stout APRN [facility] None           Pertinent  and/or Most Recent Results     PROCEDURES:   ***    LAB RESULTS:      Lab 09/10/23  0249 09/10/23  0043   WBC  --  5.67   HEMOGLOBIN  --  10.8*   HEMATOCRIT  --  31.3*   PLATELETS  --  361   NEUTROS ABS  --  2.90   IMMATURE GRANS (ABS)  --  0.01   LYMPHS ABS  --  1.85   MONOS ABS  --  0.56   EOS ABS  --  0.25   MCV  --  102.3*   CRP 0.48  --          Lab 09/11/23  0516 09/10/23  0043   SODIUM 132* 134*   POTASSIUM 3.6 4.5   CHLORIDE 91* 97*   CO2 29.4* 26.2   ANION GAP 11.6 10.8   BUN 23* 15   CREATININE 1.09 1.11   EGFR 82.2 80.4   GLUCOSE 84 86   CALCIUM 9.5 9.7    MAGNESIUM 2.1 2.1         Lab 09/10/23  0043   TOTAL PROTEIN 8.3   ALBUMIN 5.1   GLOBULIN 3.2   ALT (SGPT) 32   AST (SGOT) 52*   BILIRUBIN 0.3   ALK PHOS 106   LIPASE 275*         Lab 09/10/23  0751 09/10/23  0249 09/10/23  0043   PROBNP  --   --  3,108.0*   HSTROP T 76* 74* 78*                 Brief Urine Lab Results  (Last result in the past 365 days)        Color   Clarity   Blood   Leuk Est   Nitrite   Protein   CREAT   Urine HCG        08/23/23 0413 Yellow   Clear   Negative   Negative   Negative   Negative                 Microbiology Results (last 10 days)       ** No results found for the last 240 hours. **            XR Chest 1 View    Result Date: 9/10/2023    1. Findings most suggestive of developing mild pulmonary edema/CHF.  Developing atypical/viral infection or multifocal pneumonia is felt less likely. 2. Stable cardiomegaly.       LUI DAUGHERTY MD       Electronically Signed and Approved By: LUI DAUGHERTY MD on 9/10/2023 at 0:57               Results for orders placed during the hospital encounter of 08/22/23    Duplex Venous Lower Extremity - Right CV-READ    Interpretation Summary    Normal right lower extremity venous duplex scan.      Results for orders placed during the hospital encounter of 08/22/23    Duplex Venous Lower Extremity - Right CV-READ    Interpretation Summary    Normal right lower extremity venous duplex scan.      Results for orders placed during the hospital encounter of 08/22/23    Adult Transthoracic Echo Complete W/ Cont if Necessary Per Protocol    Interpretation Summary    The left ventricular cavity is severely dilated.    Left ventricular systolic function is severely decreased. Calculated left ventricular EF = 10.3%    Left ventricular diastolic function is consistent with (grade II w/high LAP) pseudonormalization.    Mildly reduced right ventricular systolic function noted.    The left atrial cavity is severely dilated.    Estimated right ventricular systolic  pressure from tricuspid regurgitation is normal (<35 mmHg).    There is a small (<1cm) pericardial effusion posteriorly.      Labs Pending at Discharge:        Time spent on Discharge including face to face service:  45 minutes    Electronically signed by Syed Golden DO, 09/11/23, 3:36 PM EDT.      08/22/23    Duplex Venous Lower Extremity - Right CV-READ    Interpretation Summary    Normal right lower extremity venous duplex scan.      Results for orders placed during the hospital encounter of 08/22/23    Duplex Venous Lower Extremity - Right CV-READ    Interpretation Summary    Normal right lower extremity venous duplex scan.      Results for orders placed during the hospital encounter of 08/22/23    Adult Transthoracic Echo Complete W/ Cont if Necessary Per Protocol    Interpretation Summary    The left ventricular cavity is severely dilated.    Left ventricular systolic function is severely decreased. Calculated left ventricular EF = 10.3%    Left ventricular diastolic function is consistent with (grade II w/high LAP) pseudonormalization.    Mildly reduced right ventricular systolic function noted.    The left atrial cavity is severely dilated.    Estimated right ventricular systolic pressure from tricuspid regurgitation is normal (<35 mmHg).    There is a small (<1cm) pericardial effusion posteriorly.      Labs Pending at Discharge:        Time spent on Discharge including face to face service:  45 minutes    Electronically signed by Syed Golden DO, 09/11/23, 3:36 PM EDT.

## 2023-09-11 NOTE — DISCHARGE INSTR - APPOINTMENTS
Patient needs to follow up with Xochitl Wilson(Cardiology) on 9/15/23 @1:15pm [phone]  Patient needs to make a follow up with (PCP) in 1-2 weeks. [phone]

## 2023-09-11 NOTE — CASE MANAGEMENT/SOCIAL WORK
Discharge Planning Assessment  [facility]     Patient Name: Greyson Schofield  MRN: [id number]  Today's Date: 9/11/2023    Admit Date: 9/10/2023    Plan: Pt would like a cane at discharge. Pt just moved from [place] about 2-3 weeks ago, his son just moved to [place] about a week ago. Pt is a re-admission, Pt has been wearing a life vest for about a week. PCP: VIOLETTA Doe, Pharm: [place]. Pt has started process for SNAP benefits, Pt states that he is an alcoholic, he would drink a pint of bouron a day. Pt has participated in InPt and OP substance abuse programs in the past and does not feel he needs any services at this time. Since he has found out that he has had CHF he has not been drinking, Pt states it has been about 10 days since his last drink. Pt has support from his cousin who owns a farm in [place] [facility], he had been working for him until he became sick and unable to work at this time. Pt has been working on possible disability but is waiting for his SNAP benefits to approve. Pt has passport by [employer]. Pt hopes to return home at discharge. Pt denies any fin. stressors at this time but feels he may have issues affording groceries if he does not get his food stamps. Pt will have transporation home at time of discharge. SW/CM will continue to follow for needs   Discharge Needs Assessment       Row Name 09/11/23 1121       Living Environment    People in Home child(lois), adult    Current Living Arrangements home    Duration at Residence 2-3 weeks    Potentially Unsafe Housing Conditions none    Primary Care Provided by self    Provides Primary Care For no one    Family Caregiver if Needed child(lois), adult;other relative(s)    Quality of Family Relationships helpful;involved;supportive       Resource/Environmental Concerns    Resource/Environmental Concerns none    Transportation Concerns none       Food Insecurity    Within the past 12 months, you worried that your food would run out before you got the  money to buy more. Never true    Within the past 12 months, the food you bought just didn't last and you didn't have money to get more. Never true       Transition Planning    Patient/Family Anticipates Transition to home with family    Patient/Family Anticipated Services at Transition     Transportation Anticipated family or friend will provide       Discharge Needs Assessment    Equipment Currently Used at Home none    Equipment Needed After Discharge cane, straight    Discharge Coordination/Progress Pt would like a cane at discharge. Pt just moved from [place] about 2-3 weeks ago, his son just moved to [place] about a week ago. Pt is a re-admission, Pt has been wearing a life vest for about a week. PCP: VIOLETTA Doe, Pharm: [place]. Pt has started process for SNAP benefits, Pt states that he is an alcoholic, he would drink a pint of bouron a day. Pt has participated in InPt and OP substance abuse programs in the past and does not feel he needs any services at this time. Since he has found out that he has had CHF he has not been drinking, Pt states it has been about 10 days since his last drink. Pt has support from his cousin who owns a farm in [place] [facility][[place]], he had been working for him until he became sick and unable to work at this time. Pt has been working on possible disability but is waiting for his SNAP benefits to approve. Pt has passport by [employer]. Pt hopes to return home at discharge. Pt denies any fin. stressors at this time but feels he may have issues affording groceries if he does not get his food stamps. Pt will have transporation home at time of discharge. SW/CM will continue to follow for needs.                   Discharge Plan       Row Name 09/11/23 1144       Plan    Plan Pt would like a cane at discharge. Pt just moved from [place] about 2-3 weeks ago, his son just moved to [place] about a week ago. Pt is a re-admission, Pt has been wearing a life vest for about a  week. PCP: VIOLETTA Doe, Pharm: [place]. Pt has started process for SNAP benefits, Pt states that he is an alcoholic, he would drink a pint of bouron a day. Pt has participated in InPt and OP substance abuse programs in the past and does not feel he needs any services at this time. Since he has found out that he has had CHF he has not been drinking, Pt states it has been about 10 days since his last drink. Pt has support from his cousin who owns a farm in [facility], he had been working for him until he became sick and unable to work at this time. Pt has been working on possible disability but is waiting for his SNAP benefits to approve. Pt has passport by [employer]. Pt hopes to return home at discharge. Pt denies any fin. stressors at this time but feels he may have issues affording groceries if he does not get his food stamps. Pt will have transporation home at time of discharge. SW/CM will continue to follow for needs                  Continued Care and Services - Admitted Since 9/10/2023    Coordination has not been started for this encounter.          Demographic Summary       Row Name 09/11/23 1117       General Information    Admission Type inpatient    Arrived From home    Referral Source admission list    Reason for Consult discharge planning    Preferred Language English       Contact Information    Permission Granted to Share Info With permission denied                   Functional Status       Row Name 09/11/23 1118       Functional Status    Usual Activity Tolerance good    Current Activity Tolerance good       Physical Activity    On average, how many days per week do you engage in moderate to strenuous exercise (like a brisk walk)? 0 days    On average, how many minutes do you engage in exercise at this level? 0 min    Number of minutes of exercise per week 0       Assessment of Health Literacy    How often do you have someone help you read hospital materials? Never    How often do you have problems  learning about your medical condition because of difficulty understanding written information? Never    How often do you have a problem understanding what is told to you about your medical condition? Never    How confident are you filling out medical forms by yourself? Quite a bit    Health Literacy Moderate       Functional Status, IADL    Medications independent    Meal Preparation independent    Housekeeping independent    Laundry independent    Shopping independent    IADL Comments Pt's adult son lives with him and can hlp if needed. Pt has good support from family.       Mental Status    General Appearance WDL WDL       Mental Status Summary    Recent Changes in Mental Status/Cognitive Functioning no changes       Employment/    Employment Status unemployed                   Psychosocial    No documentation.                  Abuse/Neglect    No documentation.                  Legal       Row Name 09/11/23 1119       Financial Resource Strain    How hard is it for you to pay for the very basics like food, housing, medical care, and heating? Not very       Financial/Legal    Source of Income other (see comments)  Pt was working outside of home until he was diagnosed with CHF and now has a life vest. Pt is working jessica applying for disability.    Application for Public Assistance applied    Finance Comments Pt has applied for SNAP benefits, he still waiting on decision.       Legal    Criminal Activity/Legal Involvement none                   Substance Abuse    No documentation.                  Patient Forms    No documentation.                     Xochitl Farfan

## 2023-09-11 NOTE — THERAPY EVALUATION
Acute Care - Physical Therapy Initial Evaluation   [facility]     Patient Name: Greyson Schofield  : 1970  MRN: [id number]  Today's Date: 2023      Visit Dx:     ICD-10-CM ICD-9-CM   1. Acute congestive heart failure, unspecified heart failure type  I50.9 428.0   2. Acute pulmonary edema  J81.0 518.4   3. Pedal edema  R60.0 782.3   4. Difficulty in walking  R26.2 719.7     Patient Active Problem List   Diagnosis    Acute on chronic combined systolic and diastolic congestive heart failure    Portal hypertension    Ascites due to alcoholic cirrhosis    Nicotine dependence, chewing tobacco, w unsp disorders    CHF exacerbation    Cirrhosis of liver     Past Medical History:   Diagnosis Date    Arthritis     CHF (congestive heart failure)     Cirrhosis      Past Surgical History:   Procedure Laterality Date    APPENDECTOMY      CARDIAC CATHETERIZATION N/A 2023    Procedure: Left Heart Cath with possible coronary angioplasty;  Surgeon: Jomar Lynch MD;  Location: [facility] CATH INVASIVE [facility];  Service: Cardiology;  Laterality: N/A;     PT Assessment (last 12 hours)       PT Evaluation and Treatment       Row Name 23 0928          Physical Therapy Time and Intention    Subjective Information no complaints (P)   -ZT     Document Type evaluation (P)   -ZT     Mode of Treatment individual therapy;physical therapy (P)   -ZT     Patient Effort good (P)   -ZT     Symptoms Noted During/After Treatment none (P)   -ZT       Row Name 23 0928          General Information    Patient Profile Reviewed yes (P)   -ZT     Patient Observations alert;cooperative;agree to therapy (P)   -ZT     Prior Level of Function independent: (P)   -ZT     Equipment Currently Used at Home none (P)   -ZT     Existing Precautions/Restrictions fall (P)   -ZT       Row Name 23 0928          Living Environment    Current Living Arrangements home (P)   -ZT     Home Accessibility stairs to enter home (P)   -ZT     People in  Home child(lois), adult (P)   -ZT     Primary Care Provided by self (P)   -ZT       [facility] Name 09/11/23 0928          Home Main Entrance    Number of Stairs, Main Entrance six (P)   -ZT     Stair Railings, Main Entrance railing on right side (ascending) (P)   -ZT       [facility] Name 09/11/23 0928          Home Use of Assistive/Adaptive Equipment    Equipment Currently Used at Home none (P)   -ZT       [facility] Name 09/11/23 0928          Range of Motion (ROM)    Range of Motion bilateral lower extremities;ROM is WFL (P)   -[facility] Name 09/11/23 0928          Strength (Manual Muscle Testing)    Strength (Manual Muscle Testing) bilateral lower extremities (P)   hip flexion 5/5 bilat., knee extension 5/5 bilat., knee flexion 4/5 bilat., DF 5/5 bilat  -[facility] Name 09/11/23 0928          Bed Mobility    Bed Mobility bed mobility (all) activities (P)   -ZT     All Activities, [place] (Bed Mobility) independent (P)   -ZT       Row Name 09/11/23 0928          Transfers    Transfers sit-stand transfer;stand-sit transfer (P)   -ZT       Row Name 09/11/23 0928          Sit-Stand Transfer    Sit-Stand [place] (Transfers) independent (P)   -[facility] Name 09/11/23 0928          Stand-Sit Transfer    Stand-Sit [place] (Transfers) independent (P)   -ZT       Row Name 09/11/23 0928          Gait/Stairs (Locomotion)    Gait/Stairs Locomotion gait/ambulation independence (P)   -ZT     [place] Level (Gait) standby assist (P)   -ZT     Patient was able to Ambulate yes (P)   -ZT     Distance in Feet (Gait) 300 (P)   -ZT     Pattern (Gait) step-through (P)   -ZT       [facility] Name 09/11/23 0928          Safety Issues, Functional Mobility    Impairments Affecting Function (Mobility) endurance/activity tolerance;strength (P)   -[facility] Name 09/11/23 0928          Balance    Balance Assessment standing dynamic balance (P)   -ZT     Dynamic Standing Balance standby assist (P)   -ZT       Row Name 09/11/23 0928           Plan of Care Review    Plan of Care Reviewed With patient (P)   -ZT     Outcome Evaluation Pt demonstrates functional LE strength bilaterally and good balance. He has no issues getting in and out of bed independently and can tolerate walking with no LOB or excess fatigue. This pt does not demonstrate a need for phsyical therapy services at this time. (P)   -ZT       Row Name 09/11/23 0928          Therapy Assessment/Plan (PT)    Criteria for Skilled Interventions Met (PT) no problems identified which require skilled intervention (P)   -ZT     Therapy Frequency (PT) evaluation only (P)   -ZT       Row Name 09/11/23 0928          Therapy Plan Review/Discharge Plan (PT)    Therapy Plan Review (PT) evaluation/treatment results reviewed;care plan/treatment goals reviewed;participants included;patient (P)   -ZT               User Key  (r) = Recorded By, (t) = Taken By, (c) = Cosigned By      Initials Name Provider Type    ZT Geremias Blankenship, PT Student PT Student                    Physical Therapy Education       Title: PT OT SLP Therapies (Done)       Topic: Physical Therapy (Done)       Point: Mobility training (Done)       Learning Progress Summary             Patient Acceptance, E,TB, VU by ZT at 9/11/2023 0934                         Point: Home exercise program (Done)       Learning Progress Summary             Patient Acceptance, E,TB, VU by ZT at 9/11/2023 0934                         Point: Precautions (Done)       Learning Progress Summary             Patient Acceptance, E,TB, VU by ZT at 9/11/2023 0934                                         User Key       Initials Effective Dates Name Provider Type Discipline     09/05/23 -  Geremias Blankenship, PT Student PT Student PT                  PT Recommendation and Plan  Anticipated Discharge Disposition (PT): (P) home  Therapy Frequency (PT): (P) evaluation only  Plan of Care Reviewed With: (P) patient  Outcome Evaluation: (P) Pt demonstrates functional LE strength  bilaterally and good balance. He has no issues getting in and out of bed independently and can tolerate walking with no LOB or excess fatigue. This pt does not demonstrate a need for phsyical therapy services at this time.   Outcome Measures       Row Name 09/11/23 0900             How much help from another person do you currently need...    Turning from your back to your side while in flat bed without using bedrails? 4 (P)   -ZT      Moving from lying on back to sitting on the side of a flat bed without bedrails? 4 (P)   -ZT      Moving to and from a bed to a chair (including a wheelchair)? 4 (P)   -ZT      Standing up from a chair using your arms (e.g., wheelchair, bedside chair)? 4 (P)   -ZT      Climbing 3-5 steps with a railing? 3 (P)   -ZT      To walk in hospital room? 4 (P)   -ZT      AM-PAC 6 Clicks Score (PT) 23 (P)   -ZT                User Key  (r) = Recorded By, (t) = Taken By, (c) = Cosigned By      Initials Name Provider Type    ZT Geremias Blankenship PT Student PT Student                     Time Calculation:    PT Charges       Row Name 09/11/23 0928             Time Calculation    PT Received On 09/11/23 (P)   -ZT      PT Goal Re-Cert Due Date 09/20/23 (P)   -ZT         Untimed Charges    PT Eval/Re-eval Minutes 35 (P)   -ZT         Total Minutes    Untimed Charges Total Minutes 35 (P)   -ZT       Total Minutes 35 (P)   -ZT                User Key  (r) = Recorded By, (t) = Taken By, (c) = Cosigned By      Initials Name Provider Type    ZT Geremias Blankenship PT Student PT Student                      PT G-Codes  AM-PAC 6 Clicks Score (PT): (P) 23    Geremias Blankenship PT Student  9/11/2023

## 2023-09-12 ENCOUNTER — TRANSITIONAL CARE MANAGEMENT TELEPHONE ENCOUNTER (OUTPATIENT)
Dept: CALL CENTER | Facility: HOSPITAL | Age: 53
End: 2023-09-12
Payer: COMMERCIAL

## 2023-09-12 ENCOUNTER — TELEPHONE (OUTPATIENT)
Dept: GASTROENTEROLOGY | Facility: CLINIC | Age: 53
End: 2023-09-12
Payer: COMMERCIAL

## 2023-09-12 NOTE — OUTREACH NOTE
Call to [facility] as pt did not receive cane ordered at discharge--staff reports that fax system was down yesterday and that once they review faxes/orders they will contact pt to make arrangements for pick-up delivery of DME.

## 2023-09-12 NOTE — OUTREACH NOTE
Call Center TCM Note      Flowsheet Row Responses   [facility] patient discharged from? [facility]   Does the patient have one of the following disease processes/diagnoses(primary or secondary)? CHF   TCM attempt successful? Yes  [no verbal release]   Call start time 1040   Call end time 1054   Discharge diagnosis A/C CHF, severe dilated cardiomyopathy   Meds reviewed with patient/caregiver? Yes   Is the patient having any side effects they believe may be caused by any medication additions or changes? No   Does the patient have all medications ordered at discharge? No   What is keeping the patient from filling the prescriptions? --  [Pharmacy closed when pt discharged--pt to  prescriptions this am]   Nursing Interventions Nurse provided patient education   Comments PCP follow up appointment is 9/21/23@215pm, Reminded of cardiology f/u on schedule   Does the patient have an appointment with their PCP within 7-14 days of discharge? Yes   What is the Home health agency?  [facility]   Has home health visited the patient within 72 hours of discharge? Call prior to 72 hours   What DME was ordered? cane ordered--jenni   Has all DME been delivered? No   Pulse Ox monitoring None   Psychosocial issues? No   Comments Pt has a Lifevest for use,  compliant with use, reinforced importance of use.   Did the patient receive a copy of their discharge instructions? Yes   Nursing interventions Reviewed instructions with patient   What is the patient's perception of their health status since discharge? Improving  [Improving--pt denies edema, SOA but is discouraged somewhat with his dx at this time.  Reinforced importance of f/u, diet and medication adherence.]   Nursing interventions Nurse provided patient education  [CHF education provided, pt is compliant with smoking and etoh cessation.  Pt is following low sodium diet and 2000ml qd fluid restriction.  Pt paracentesis site healing w/o incident. ]   Is  the patient able to teach back signs and symptoms of worsening condition? (i.e. weight gain, shortness of air, etc.) Yes   If the patient is a current smoker, are they able to teach back resources for cessation? Not a smoker  [not longer smoking]   Is the patient/caregiver able to teach back the hierarchy of who to call/visit for symptoms/problems? PCP, Specialist, Home health nurse, Urgent Care, ED, 911 Yes   Is the patient able to teach back Heart Failure Zones? Yes   CHF Zone this Call Green Zone   Green Zone Patient reports doing well, No new or worsening shortness of breath, Physical activity level is normal for you, No new swelling -  feet, ankles and legs look normal for you, Weight check stable, No chest pain   Green Zone Interventions Low sodium diet, Daily weight check, Meds as directed, Follow up visits planned  [Weight down to 147# this am,  reports he was 166# on admission]   TCM call completed? Yes   Call end time [id number]             Kasey Hernandez RN    9/12/2023, 11:07 EDT

## 2023-09-12 NOTE — TELEPHONE ENCOUNTER
Erika from [facility] contacted the office and advised that the patient was admitted on 8/22 and at the time of discharge he was supposed to follow up with gastro in 2 weeks but the patient was readmitted on 9/10 and discharged 9/11, she also wanted to know our process on scheduling ER/ Inpatient patient's and if we were notified if a patient is to be seen by gastro. I advised that if we do not have a referral placed or if the patient doesn't call the office then we would not know to schedule the patient an appointment with our office unless the patient had been seen by one of the gastro providers then at that point we would receive a message from the provider on how to proceed with the patient. Erika advised that she was going place an AMB referral to gastro to see if it would show on my side and once I informed her that it was there she stated that she would ask for some education training so that way referrals could be placed so patient's don't fall through the cracks. Patient has been scheduled for the next available ER/ Inpatient follow up appointment with Agustina Pang.

## 2023-09-15 ENCOUNTER — OFFICE VISIT (OUTPATIENT)
Dept: FAMILY MEDICINE CLINIC | Facility: CLINIC | Age: 53
End: 2023-09-15
Payer: COMMERCIAL

## 2023-09-15 VITALS
HEIGHT: 65 IN | WEIGHT: 153.4 LBS | OXYGEN SATURATION: 98 % | DIASTOLIC BLOOD PRESSURE: 66 MMHG | HEART RATE: 82 BPM | TEMPERATURE: 98.4 F | RESPIRATION RATE: 18 BRPM | BODY MASS INDEX: 25.56 KG/M2 | SYSTOLIC BLOOD PRESSURE: 106 MMHG

## 2023-09-15 DIAGNOSIS — K70.31 ALCOHOLIC CIRRHOSIS OF LIVER WITH ASCITES: ICD-10-CM

## 2023-09-15 DIAGNOSIS — I50.22 CHRONIC HFREF (HEART FAILURE WITH REDUCED EJECTION FRACTION): Primary | ICD-10-CM

## 2023-09-15 DIAGNOSIS — E03.9 ACQUIRED HYPOTHYROIDISM: ICD-10-CM

## 2023-09-15 DIAGNOSIS — K76.6 PORTAL HYPERTENSION: ICD-10-CM

## 2023-09-15 DIAGNOSIS — K70.31 ASCITES DUE TO ALCOHOLIC CIRRHOSIS: ICD-10-CM

## 2023-09-15 PROCEDURE — 99204 OFFICE O/P NEW MOD 45 MIN: CPT | Performed by: FAMILY MEDICINE

## 2023-09-15 RX ORDER — MULTIVITAMIN
1 CAPSULE ORAL DAILY
COMMUNITY

## 2023-09-15 RX ORDER — PAROXETINE 10 MG/1
10 TABLET, FILM COATED ORAL
COMMUNITY
Start: 2023-09-14 | End: 2024-09-13

## 2023-09-15 RX ORDER — LANOLIN ALCOHOL/MO/W.PET/CERES
100 CREAM (GRAM) TOPICAL DAILY
Qty: 30 TABLET | Refills: 0 | COMMUNITY
Start: 2023-09-01 | End: 2023-10-01

## 2023-09-15 NOTE — PROGRESS NOTES
"Chief Complaint  Medication Problem (Go over medications ), Annual Exam, and referral (Talk about referral's)    Subjective        Greyson Schofield presents to [facility]  History of Present Illness  He is here today to establish relations as a new patient. He is from [place]. He is a chronic alcoholic. He has liver cirrohsis, HFrEF with EF of 10.3%.    He has been in the hospital twice in the past couple weeks due to heart failure. He is wearing a life vest today.     The patient has no other complaints today and denies chest pain, shortness of breath, weakness, numbness, nausea, vomiting, diarrhea, dizziness or syncopal event.             Objective   Vital Signs:  /66 (BP Location: Left arm, Patient Position: Sitting, Cuff Size: Adult)   Pulse 82   Temp 98.4 °F (36.9 °C) (Tympanic)   Resp 18   Ht 165.1 cm (65\")   Wt 69.6 kg (153 lb 6.4 oz)   SpO2 98%   BMI 25.53 kg/m²   Estimated body mass index is 25.53 kg/m² as calculated from the following:    Height as of this encounter: 165.1 cm (65\").    Weight as of this encounter: 69.6 kg (153 lb 6.4 oz).             Physical Exam  Vitals reviewed.   Constitutional:       Appearance: He is well-developed and overweight.   HENT:      Head: Normocephalic and atraumatic.      Right Ear: External ear normal.      Left Ear: External ear normal.      Mouth/Throat:      Pharynx: No oropharyngeal exudate.   Eyes:      Conjunctiva/sclera: Conjunctivae normal.      Pupils: Pupils are equal, round, and reactive to light.   Neck:      Vascular: No carotid bruit.   Cardiovascular:      Rate and Rhythm: Normal rate and regular rhythm.      Heart sounds: No murmur heard.    No friction rub. No gallop.   Pulmonary:      Effort: Pulmonary effort is normal.      Breath sounds: Normal breath sounds. No wheezing or rhonchi.   Abdominal:      General: There is no distension.   Skin:     General: Skin is warm and dry.   Neurological:      " Mental Status: He is alert and oriented to person, place, and time.      Cranial Nerves: No cranial nerve deficit.      Motor: No weakness.   Psychiatric:         Mood and Affect: Mood and affect normal.         Behavior: Behavior normal.         Thought Content: Thought content normal.         Judgment: Judgment normal.      Result Review :    CMP          8/31/2023    00:55 8/31/2023    04:31 9/10/2023    00:43 9/11/2023    05:16   CMP   Glucose  81  86  84    BUN  22  15  23    Creatinine  1.16  1.11  1.09    EGFR  76.3  80.4  82.2    Sodium  130  134  132    Potassium  3.8  4.5  3.6    Chloride  95  97  91    Calcium  8.8  9.7  9.5    Total Protein   8.3     Albumin 4.1  4.4  5.1     Globulin   3.2     Total Bilirubin   0.3     Alkaline Phosphatase   106     AST (SGOT)   52     ALT (SGPT)   32     Albumin/Globulin Ratio   1.6     BUN/Creatinine Ratio  19.0  13.5  21.1    Anion Gap  9.8  10.8  11.6      CBC          8/28/2023    04:28 8/29/2023    05:40 9/10/2023    00:43   CBC   WBC 6.30  5.77  5.67    RBC 3.31  3.18  3.06    Hemoglobin 11.4  10.9  10.8    Hematocrit 33.7  32.6  31.3    .8  102.5  102.3    MCH 34.4  34.3  35.3    MCHC 33.8  33.4  34.5    RDW 15.3  15.3  14.9    Platelets 351  331  361      Lipid Panel          8/22/2023    04:38 8/26/2023    04:45   Lipid Panel   Total Cholesterol 128  162    Triglycerides 60  64    HDL Cholesterol 57  76    VLDL Cholesterol 13  13    LDL Cholesterol  58  73    LDL/HDL Ratio 1.04  0.96      TSH          8/27/2023    04:45   TSH   TSH 53.500                   Assessment and Plan   Diagnoses and all orders for this visit:    1. Chronic HFrEF (heart failure with reduced ejection fraction) (Primary)  Assessment & Plan:  He is wearing the life vest. He is working with cardiology to get on the heart transplant list. He is stable today.       2. Portal hypertension  -     Ambulatory Referral to Hepatology    3. Alcoholic cirrhosis of liver with ascites  -      Ambulatory Referral to Hepatology    4. Ascites due to alcoholic cirrhosis  -     Ambulatory Referral to Hepatology    5. Acquired hypothyroidism  Assessment & Plan:  He is currently on 100 mcg of levothyroxine. His thyroid level will be checked in 2 weeks and managed according to findings.      Orders:  -     TSH+Free T4; Future             Follow Up   Return in about 2 weeks (around 9/29/2023).  Patient was given instructions and counseling regarding his condition or for health maintenance advice. Please see specific information pulled into the AVS if appropriate.

## 2023-09-15 NOTE — ASSESSMENT & PLAN NOTE
He is currently on 100 mcg of levothyroxine. His thyroid level will be checked in 2 weeks and managed according to findings.

## 2023-09-17 LAB
QT INTERVAL: 434 MS
QTC INTERVAL: 497 MS

## 2023-09-18 PROBLEM — I50.22 CHRONIC HFREF (HEART FAILURE WITH REDUCED EJECTION FRACTION): Chronic | Status: ACTIVE | Noted: 2023-09-11

## 2023-09-18 PROBLEM — I50.22 CHRONIC HFREF (HEART FAILURE WITH REDUCED EJECTION FRACTION): Status: ACTIVE | Noted: 2023-09-11

## 2023-09-18 PROBLEM — I50.9 CHF EXACERBATION: Status: RESOLVED | Noted: 2023-09-10 | Resolved: 2023-09-18

## 2023-09-18 NOTE — ASSESSMENT & PLAN NOTE
He is wearing the life vest. He is working with cardiology to get on the heart transplant list. He is stable today.

## 2023-09-19 ENCOUNTER — PATIENT OUTREACH (OUTPATIENT)
Dept: CASE MANAGEMENT | Facility: OTHER | Age: 53
End: 2023-09-19
Payer: COMMERCIAL

## 2023-09-19 ENCOUNTER — READMISSION MANAGEMENT (OUTPATIENT)
Dept: CALL CENTER | Facility: HOSPITAL | Age: 53
End: 2023-09-19
Payer: COMMERCIAL

## 2023-09-19 DIAGNOSIS — K70.31 ALCOHOLIC CIRRHOSIS OF LIVER WITH ASCITES: ICD-10-CM

## 2023-09-19 DIAGNOSIS — I50.43 ACUTE ON CHRONIC COMBINED SYSTOLIC AND DIASTOLIC CONGESTIVE HEART FAILURE: ICD-10-CM

## 2023-09-19 DIAGNOSIS — K70.31 ASCITES DUE TO ALCOHOLIC CIRRHOSIS: Primary | Chronic | ICD-10-CM

## 2023-09-19 DIAGNOSIS — I50.22 CHRONIC HFREF (HEART FAILURE WITH REDUCED EJECTION FRACTION): Chronic | ICD-10-CM

## 2023-09-19 NOTE — OUTREACH NOTE
AMBULATORY CASE MANAGEMENT NOTE    Name and Relationship of Patient/Support Person: Greyson Schofield L - Self    CCM Interim Update    Called and explained CCM services to patient. He agreed to consent for chronic conditions of CHF, EF 10% possible getting heart transplant with [facility] doctors, chronic cirrhosis of liver with ascites. He has current home health coming but unsure of company, he will find out today at his visit with nursing and update me at next call.     We discussed program in detail and I read him the entire consent and we discussed some goals. He is excited for the support. He is wanting call back early next week to complete intake and assessment. PCP updated.                 Bushra CHEN  Ambulatory Case Management    9/19/2023, 13:54 EDT

## 2023-09-19 NOTE — OUTREACH NOTE
CHF Week 2 Survey      Flowsheet Row Responses   [facility] patient discharged from? [place]   Does the patient have one of the following disease processes/diagnoses(primary or secondary)? CHF   Week 2 attempt successful? Yes   Call start time 1409   Call end time 1412   Discharge diagnosis A/C CHF, severe dilated cardiomyopathy   Meds reviewed with patient/caregiver? Yes   Is the patient having any side effects they believe may be caused by any medication additions or changes? No   Does the patient have all medications ordered at discharge? Yes   Is the patient taking all medications as directed (includes completed medication regime)? Yes   Does the patient have a primary care provider?  Yes   Has the patient kept scheduled appointments due by today? Yes   [facility]   Has home health visited the patient within 72 hours of discharge? Yes   Has all DME been delivered? No   Pulse Ox monitoring None   Psychosocial issues? No   Did the patient receive a copy of their discharge instructions? Yes   Nursing interventions Reviewed instructions with patient   What is the patient's perception of their health status since discharge? Improving   Nursing interventions Nurse provided patient education   Is the patient able to teach back signs and symptoms of worsening condition? (i.e. weight gain, shortness of air, etc.) Yes   If the patient is a current smoker, are they able to teach back resources for cessation? Not a smoker   Is the patient/caregiver able to teach back the hierarchy of who to call/visit for symptoms/problems? PCP, Specialist, Home health nurse, Urgent Care, ED, 911 Yes   Is the patient able to teach back Heart Failure Zones? Yes   CHF Nursing Interventions Education provided on various zones   CHF Zone this Call Green Zone   Green Zone Patient reports doing well, No new or worsening shortness of breath, Physical activity level is normal for you, No  new swelling -  feet, ankles and legs look normal for you, Weight check stable, No chest pain   Green Zone Interventions Daily weight check, Follow up visits planned, Meds as directed, Low sodium diet   CHF Week 2 call completed? Yes   Call end time 1412            Antionette SOMMER - Licensed Nurse

## 2023-09-21 NOTE — PROGRESS NOTES
"Enter Query Response Below      Query Response: Type II MI due to acute on chronic heart failure exacerbation.      Electronically signed by Kobi Courtney MD, 23, 8:31 AM EDT.               If applicable, please update the problem list.   Patient: Greyson Schofield        : 1970  Account: [id number]           Admit Date: 9/10/2023        How to Respond to this query:       a. Click New Note     b. Answer query within the yellow box.                c. Update the Problem List, if applicable.    If you have any questions about this query contact me at: [email or web address]    Dr. Courtney,     54 yo male admitted 9/10/23 for \"CHF exacerbation\" per History and Physical. Also noted is \"Troponin elevated, (chronic).  Will trend.\"  Risk Factors: Dilated cardiomyopathy and CAD  Clinical indicators: Cardiology consulted. 9/10/23 \"Elevated troponin : Type II MI related to heart failure exacerbation.\"  Subsequent note 23 \"Mildly elevated troponin without angina or acute ischemic ST-T changes, more like related to CHF exacerbation.\"  Troponin T levels reported as follows: (9/10/23 @ 00:43) 78, (9/10/23 @ 02:49) 74 Delta -4, and (9/10/23 @ 07:51) 76.   Discharge summary is not available.  Treatment: IV Lasix    After study, was Type II MI clinically supported during this admission?    Type II MI was supported with additional clinical indicators:____________  Type II MI was not supported  Other- specify_____________  Unable to determine    By submitting this query, we are merely seeking further clarification of documentation to accurately reflect all conditions that you are monitoring, evaluating, treating or that extend the hospitalization or utilize additional resources of care. Please utilize your independent clinical judgment when addressing the question(s) above.     This query and your response, once completed, will be entered into the legal medical record.    Sincerely,  Petty OLIVAS RN " CCDS  System Director Clinical Documentation Integrity Program

## 2023-09-25 ENCOUNTER — TELEPHONE (OUTPATIENT)
Dept: FAMILY MEDICINE CLINIC | Facility: CLINIC | Age: 53
End: 2023-09-25

## 2023-09-25 ENCOUNTER — PATIENT OUTREACH (OUTPATIENT)
Dept: CASE MANAGEMENT | Facility: OTHER | Age: 53
End: 2023-09-25

## 2023-09-25 DIAGNOSIS — I50.43 ACUTE ON CHRONIC COMBINED SYSTOLIC AND DIASTOLIC CONGESTIVE HEART FAILURE: ICD-10-CM

## 2023-09-25 DIAGNOSIS — K70.31 ALCOHOLIC CIRRHOSIS OF LIVER WITH ASCITES: ICD-10-CM

## 2023-09-25 DIAGNOSIS — K70.31 ASCITES DUE TO ALCOHOLIC CIRRHOSIS: ICD-10-CM

## 2023-09-25 DIAGNOSIS — I50.22 CHRONIC HFREF (HEART FAILURE WITH REDUCED EJECTION FRACTION): Primary | ICD-10-CM

## 2023-09-25 LAB — FUNGUS WND CULT: NORMAL

## 2023-09-25 NOTE — TELEPHONE ENCOUNTER
Patient states you all discussed anxiety /depression medication at last visit but nothing was prescribed.  Asking for medication to be sent to [facility].

## 2023-09-25 NOTE — TELEPHONE ENCOUNTER
Caller: sophia cardoza    Relationship to patient: Emergency Contact    Best call back number:     [phone] (Mobile)       Patient is needing: PATIENT'S EMERGENCY CONTACT CALLED IN AND SAID LAST TIME PATIENT WAS SEEN DR. BANKS HAD DISCUSSED MEDICATION FOR DEPRESSION AND ANXIETY BUT NO PRESCRIPTION HAS BEEN SENT TO [facility] - [place], [place] - [address] [facility] - [phone]  - [phone] . PATIENT WOULD LIKE A CALL BACK PLEASE

## 2023-09-27 RX ORDER — DULOXETIN HYDROCHLORIDE 30 MG/1
30 CAPSULE, DELAYED RELEASE ORAL DAILY
Qty: 30 CAPSULE | Refills: 5 | Status: SHIPPED | OUTPATIENT
Start: 2023-09-27

## 2023-09-28 ENCOUNTER — OFFICE VISIT (OUTPATIENT)
Dept: FAMILY MEDICINE CLINIC | Facility: CLINIC | Age: 53
End: 2023-09-28
Payer: COMMERCIAL

## 2023-09-28 VITALS
RESPIRATION RATE: 18 BRPM | BODY MASS INDEX: 24.22 KG/M2 | HEIGHT: 65 IN | OXYGEN SATURATION: 97 % | HEART RATE: 67 BPM | DIASTOLIC BLOOD PRESSURE: 55 MMHG | TEMPERATURE: 97.8 F | SYSTOLIC BLOOD PRESSURE: 94 MMHG | WEIGHT: 145.4 LBS

## 2023-09-28 DIAGNOSIS — E03.9 ACQUIRED HYPOTHYROIDISM: ICD-10-CM

## 2023-09-28 DIAGNOSIS — I50.22 CHRONIC HFREF (HEART FAILURE WITH REDUCED EJECTION FRACTION): Primary | ICD-10-CM

## 2023-09-28 DIAGNOSIS — K70.31 ALCOHOLIC CIRRHOSIS OF LIVER WITH ASCITES: ICD-10-CM

## 2023-09-28 DIAGNOSIS — K76.6 PORTAL HYPERTENSION: ICD-10-CM

## 2023-09-28 DIAGNOSIS — R53.1 WEAKNESS: ICD-10-CM

## 2023-09-28 PROBLEM — I50.43 ACUTE ON CHRONIC COMBINED SYSTOLIC AND DIASTOLIC CONGESTIVE HEART FAILURE: Status: RESOLVED | Noted: 2023-08-22 | Resolved: 2023-09-28

## 2023-09-28 PROBLEM — K74.60 CIRRHOSIS OF LIVER: Chronic | Status: ACTIVE | Noted: 2023-09-10

## 2023-09-28 PROCEDURE — 99214 OFFICE O/P EST MOD 30 MIN: CPT | Performed by: FAMILY MEDICINE

## 2023-09-28 NOTE — ASSESSMENT & PLAN NOTE
The patient is currently on 100 mcg of Synthroid daily.  He will be continued on this dosage and we will see him back at his next clinic appointment and manage according to findings.

## 2023-09-28 NOTE — ASSESSMENT & PLAN NOTE
The patient appears to be stable today and no sign of acute heart failure appreciated.  No significant lower extremity edema on physical exam.  He is taking all medications as prescribed.

## 2023-09-28 NOTE — ASSESSMENT & PLAN NOTE
The patient met with transplant team yesterday and is in the works to get a liver as well as heart transplant.

## 2023-09-28 NOTE — ASSESSMENT & PLAN NOTE
The patient appears stable today.  He does not have any significant ascites or lower extremity swelling.  He will be continued on his current dosages of Aldactone and Farxiga as prescribed.

## 2023-09-28 NOTE — PROGRESS NOTES
" Chief Complaint  Follow-up, Hypertension, Congestive Heart Failure, Anxiety, and Pain    Subjective        Greyson Schofield presents to [facility]  History of Present Illness  He is here today to for follow-up for the management of his chronic medical conditions.  He is from [place]. He is a chronic alcoholic. He has liver cirrohsis, HFrEF with EF of 10.3%.     The patient's continue to wear a life vest and denies he has had any shocks from the LifeVest since he for started wearing it.  He was seen yesterday by hepatology in [place] and they are going to begin to working up liver and heart transplant.     The patient has no other complaints today and denies chest pain, shortness of breath, weakness, numbness, nausea, vomiting, diarrhea, dizziness or syncopal event.         Objective   Vital Signs:  BP 94/55 (BP Location: Left arm, Patient Position: Sitting, Cuff Size: Adult)   Pulse 67   Temp 97.8 °F (36.6 °C) (Tympanic)   Resp 18   Ht 165.1 cm (65\")   Wt 66 kg (145 lb 6.4 oz)   SpO2 97%   BMI 24.20 kg/m²   Estimated body mass index is 24.2 kg/m² as calculated from the following:    Height as of this encounter: 165.1 cm (65\").    Weight as of this encounter: 66 kg (145 lb 6.4 oz).             Physical Exam  Vitals reviewed.   Constitutional:       Appearance: Normal appearance. He is well-developed.   HENT:      Head: Normocephalic and atraumatic.      Right Ear: External ear normal.      Left Ear: External ear normal.      Mouth/Throat:      Pharynx: No oropharyngeal exudate.   Eyes:      Conjunctiva/sclera: Conjunctivae normal.      Pupils: Pupils are equal, round, and reactive to light.   Neck:      Vascular: No carotid bruit.   Cardiovascular:      Rate and Rhythm: Normal rate and regular rhythm.      Heart sounds: No murmur heard.    No friction rub. No gallop.   Pulmonary:      Effort: Pulmonary effort is normal.      Breath sounds: Normal breath sounds. No " wheezing or rhonchi.   Abdominal:      General: There is no distension.   Skin:     General: Skin is warm and dry.   Neurological:      Mental Status: He is alert and oriented to person, place, and time.      Cranial Nerves: No cranial nerve deficit.      Motor: No weakness.   Psychiatric:         Mood and Affect: Mood and affect normal.         Behavior: Behavior normal.         Thought Content: Thought content normal.         Judgment: Judgment normal.      Result Review :    CMP          8/31/2023    00:55 8/31/2023    04:31 9/10/2023    00:43 9/11/2023    05:16   CMP   Glucose  81  86  84    BUN  22  15  23    Creatinine  1.16  1.11  1.09    EGFR  76.3  80.4  82.2    Sodium  130  134  132    Potassium  3.8  4.5  3.6    Chloride  95  97  91    Calcium  8.8  9.7  9.5    Total Protein   8.3     Albumin 4.1  4.4  5.1     Globulin   3.2     Total Bilirubin   0.3     Alkaline Phosphatase   106     AST (SGOT)   52     ALT (SGPT)   32     Albumin/Globulin Ratio   1.6     BUN/Creatinine Ratio  19.0  13.5  21.1    Anion Gap  9.8  10.8  11.6      CBC          8/28/2023    04:28 8/29/2023    05:40 9/10/2023    00:43   CBC   WBC 6.30  5.77  5.67    RBC 3.31  3.18  3.06    Hemoglobin 11.4  10.9  10.8    Hematocrit 33.7  32.6  31.3    .8  102.5  102.3    MCH 34.4  34.3  35.3    MCHC 33.8  33.4  34.5    RDW 15.3  15.3  14.9    Platelets 351  331  361      Lipid Panel          8/22/2023    04:38 8/26/2023    04:45   Lipid Panel   Total Cholesterol 128  162    Triglycerides 60  64    HDL Cholesterol 57  76    VLDL Cholesterol 13  13    LDL Cholesterol  58  73    LDL/HDL Ratio 1.04  0.96      TSH          8/27/2023    04:45   TSH   TSH 53.500                   Assessment and Plan   Diagnoses and all orders for this visit:    1. Chronic HFrEF (heart failure with reduced ejection fraction) (Primary)  Assessment & Plan:  The patient appears to be stable today and no sign of acute heart failure appreciated.  No significant  lower extremity edema on physical exam.  He is taking all medications as prescribed.    Orders:  -     Commode Chair  -     Cane    2. Portal hypertension  Assessment & Plan:  The patient appears stable today.  He does not have any significant ascites or lower extremity swelling.  He will be continued on his current dosages of Aldactone and Farxiga as prescribed.    Orders:  -     Commode Chair    3. Weakness  -     Cane    4. Acquired hypothyroidism  Assessment & Plan:  The patient is currently on 100 mcg of Synthroid daily.  He will be continued on this dosage and we will see him back at his next clinic appointment and manage according to findings.      5. Alcoholic cirrhosis of liver with ascites  Assessment & Plan:  The patient met with transplant team yesterday and is in the works to get a liver as well as heart transplant.               Follow Up   Return in about 3 months (around 12/28/2023).  Patient was given instructions and counseling regarding his condition or for health maintenance advice. Please see specific information pulled into the AVS if appropriate.

## 2023-09-29 ENCOUNTER — READMISSION MANAGEMENT (OUTPATIENT)
Dept: CALL CENTER | Facility: HOSPITAL | Age: 53
End: 2023-09-29
Payer: COMMERCIAL

## 2023-09-29 ENCOUNTER — PATIENT OUTREACH (OUTPATIENT)
Dept: CASE MANAGEMENT | Facility: OTHER | Age: 53
End: 2023-09-29
Payer: COMMERCIAL

## 2023-09-29 DIAGNOSIS — K70.31 ASCITES DUE TO ALCOHOLIC CIRRHOSIS: ICD-10-CM

## 2023-09-29 DIAGNOSIS — K70.31 ALCOHOLIC CIRRHOSIS OF LIVER WITH ASCITES: ICD-10-CM

## 2023-09-29 DIAGNOSIS — I50.22 CHRONIC HFREF (HEART FAILURE WITH REDUCED EJECTION FRACTION): Primary | ICD-10-CM

## 2023-09-29 PROCEDURE — 99490 CHRNC CARE MGMT STAFF 1ST 20: CPT | Performed by: FAMILY MEDICINE

## 2023-09-29 PROCEDURE — 99439 CHRNC CARE MGMT STAF EA ADDL: CPT | Performed by: FAMILY MEDICINE

## 2023-09-29 NOTE — OUTREACH NOTE
[facility] End of Month Documentation    This Chronic Medical Management Care Plan for Greyson Schofield, 53 y.o. male, has been established; a new plan of care implemented and a new plan of care implemented for the month of September.  A cumulative time of 76  minutes was spent on this patient record this month, including face to face with provider; phone call with patient; chart review; electronic communication with primary care provider.    Regarding the patient's problems: has Portal hypertension; Ascites due to alcoholic cirrhosis; Nicotine dependence, chewing tobacco, w unsp disorders; Cirrhosis of liver; Chronic HFrEF (heart failure with reduced ejection fraction); and Acquired hypothyroidism on their problem list., the following items were addressed: medical records; medications and any changes can be found within the plan section of the note.  A detailed listing of time spent for chronic care management is tracked within each outreach encounter.  Current medications include:  has a current medication list which includes the following prescription(s): aspirin, b complex vitamins, farxiga, digoxin, duloxetine, furosemide, hydroxyzine pamoate, levocetirizine, levothyroxine, metoprolol succinate xl, multivitamin, multivitamin with minerals, entresto, spironolactone, thiamine, thiamine, verquvo, and verquvo. and the patient is reported to be patient is compliant with medication protocol,  Medications are reported to be effective.  Regarding these diagnoses, referrals were made to the following provider(s):  Cardiology visits to [facility] group.  All notes on chart for PCP to review.    The patient was monitored remotely for blood pressure; activity level; pain; mood & behavior.    The patient's physical needs include:  DME supplies.     The patient's mental support needs include:  increased support    The patient's cognitive support needs include:  not applicable    The patient's psychosocial support needs include:  need for  increased support; coordination of community providers; medication management or adherence    The patient's functional needs include: DME    The patient's environmental needs include:  not applicable, none    Care Plan overall comments:  initiated    Refer to previous outreach notes for more information on the areas listed above.    Monthly Billing Diagnoses  (I50.22) Chronic HFrEF (heart failure with reduced ejection fraction)    (K70.31) Alcoholic cirrhosis of liver with ascites    (K70.31) Ascites due to alcoholic cirrhosis    Medications   Medications have been reconciled    Care Plan progress this month:      Recently Modified Care Plans Updates made since 8/29/2023 12:00 AM       Anxiety (Adult)           Problem Priority Last Modified     Anxiety Identification (Anxiety) --  9/25/2023  2:43 PM by Bushra Coy RN              Goal Recent Progress Last Modified     Anxiety Symptoms Identified --  9/25/2023  2:43 PM by Bushra Coy, RN     Evidence-based guidance:   Assess for presence of additional co-occurring psychiatric comorbidity [e.g., substance use, other anxiety disorder (specific phobia, social anxiety disorder, panic disorder, agoraphobia, substance or medically-induced    anxiety disorder)].   Assess for presence of medical comorbidity (e.g., chronic pain, chronic illness), recent or recurrent trauma or abuse, family history of substance use disorder or mental illness.   Screen for anxiety using standardized, validated tool.   Move gradually from investigating somatic complaints to exploring social or psychologic distress.   Assess for signs and symptoms of anxiety in an atmosphere of hope and optimism.   Facilitate full diagnostic interview when positive screening results are noted; utilize DSM-5 criteria to determine appropriate diagnosis.   Screen for depression simultaneously due to the frequency of co-occurrence.    Notes:            Task Due Date Last Modified     Identify Anxiety Symptoms  and Facilitate Treatment --  9/25/2023  2:43 PM by Bushra Coy RN     Care Management Activities:      - not discussed during this outreach      Notes:              Problem Priority Last Modified     Symptoms (Anxiety) --  9/25/2023  2:43 PM by Bushra Coy RN              Goal Recent Progress Last Modified     Anxiety Symptoms Monitored and Managed --  9/25/2023  2:43 PM by Bushra Coy RN     Evidence-based guidance:   Discuss adherence to medication therapy; assess and manage barriers, such as costs, side effects, feeling little or no improvement, stigma or low motivation.   Discuss past experiences with psychotropic medication, potential side effects and need to continue medication until treatment becomes effective (e.g., 4 to 8 weeks).   Explain the interplay of stress and physical symptoms, as well as the relationship between illness and emotional problems.   Explore complementary and alternative therapy options, such as applied relaxation, mindfulness, meditation, music therapy, aromatherapy, acupuncture or massage.   Prepare patient for antidepressant pharmacologic therapy which may include additional short-term medications until maintenance medications demonstrate therapeutic effect.    Assess response; monitor and manage side effects.   Prepare patient for use of pharmacologic therapy (e.g., anxiolytic, selective serotonin-reuptake inhibitor, serotonin norepinephrine-reuptake inhibitor, tricyclic antidepressant, antiepileptic, antipsychotic for comorbid depression,    phosphodiesterase-inhibitor for sexual dysfunction, sedative or hypnotic for sleep disturbance); monitor and manage side effects.   Prepare patient for long-term pharmacologic therapy to prevent relapse (e.g., 12 months after symptom improvement).   Promote cognitive behavioral therapy, individualized to severity of symptoms [e.g., nonfacilitated self-help, facilitated (computerized or manual-based) self-help, face-to-face  individual treatment].   Encourage patient to develop a self-management plan that identifies and manages lifestyle triggers (e.g., caffeine, stimulants, nicotine, stress), improves sleep, exercise or physical activity based on tolerance.   Prepare patient for a referral to a psychiatrist when patient has a poor response to treatment, atypical presentation or comorbid psychiatric disorder.   Provide frequent follow-up (e.g., every 2 weeks for the first 6 to 8 weeks of treatment and monthly thereafter).   Explore means to support work reintegration, such as modified working hours, peer support or coaching or a community [facility] program.    Notes:            Task Due Date Last Modified     Alleviate Barriers to Anxiety Treatment Success --  9/25/2023  2:43 PM by Bushra Coy RN     Care Management Activities:      - not discussed during this outreach      Notes:              Problem Priority Last Modified     Harm or Injury (Anxiety) --  9/25/2023  2:43 PM by Bushra Coy RN              Goal Recent Progress Last Modified     Harm or Injury Prevented --  9/25/2023  2:43 PM by Bushra Coy RN     Evidence-based guidance:   When using selective serotonin-reuptake inhibitor, anticipate bone density scanning and supplementation with calcium and vitamin D based on presentation and risk factors.   Engage family in providing a safe home environment and in closely monitoring changes in the patient's emotional state, such as negativity, hopelessness and suicidal ideation.   Explore risk of suicide, self-injurious behavior, self-neglect, severe functional impairment and potential harm to others.   Explore use of herbal and over-the-counter medications, such as River's Wort, Kava or Valerian; address drug interaction concerns.   Maintain frequent, structured and supportive contact, such as by phone, office or home visit; collaborate closely with behavioral health specialists and psychiatry.   Make immediate arrangements  for evaluation at a local emergency department, community mental health agency or other psychiatric service when patient expresses positive suicidal ideation with a plan and access to lethal means.   Monitor and manage side effects of pharmacologic therapy, including osteoporotic fractures, fall risk and gastrointestinal disturbances; consider exaggerated side effects in the elderly due to longer elimination half-life.   Provide anticipatory guidance to remove lethal medication and firearms from home; ensure adequate supervision is provided to monitor for increasing risk factors; provide emergency contact information and instructions.    Notes:            Task Due Date Last Modified     Identify and Reduce Risks for Harm or Injury --  9/25/2023  2:43 PM by Bushra Coy RN     Care Management Activities:      - not discussed during this outreach      Notes:                   Heart Failure (Adult)           Problem Priority Last Modified     Symptom Exacerbation (Heart Failure) --  9/25/2023  2:43 PM by Bushra Coy RN              Goal Recent Progress Last Modified     Symptom Exacerbation Prevented or Minimized --  9/25/2023  2:43 PM by Bushra Coy RN     Evidence-based guidance:   Perform or review cognitive and/or health literacy screening.   Assess understanding of adherence and barriers to treatment plan, as well as lifestyle changes; develop strategies to address barriers.   Establish a [id number] early intervention process to communicate with primary care provider when signs/symptoms worsen.   Facilitate timely posthospital discharge or emergency department treatment that includes intensive follow-up via telephone calls, home visit, telehealth monitoring and care at multidisciplinary heart failure clinic.   Adjust frequency and intensity of follow-up based on presentation, number of emergency department visits, hospital admissions and frequency and severity of symptom exacerbation.    Facilitate timely visit, usually within 1 week, with primary care provider following hospital discharge.   Collaborate with clinical pharmacist to address adverse drug reactions, drug interactions, subtherapeutic dosage, patient and family education.   Regularly screen for presence of depressive symptoms using a validated tool; consider pharmacologic therapy and/or referral for cognitive behavioral therapy when present.   Refer to community-based services, such as a heart failure support group, community health worker or peer support program.   Review immunization status; arrange receipt of needed vaccinations.   Prepare patient for home oxygen use based on signs/symptoms.    Notes:            Task Due Date Last Modified     Identify and Minimize Risk of Heart Failure Exacerbation --  9/25/2023  2:43 PM by Bushra Coy RN     Care Management Activities:      - not discussed during this outreach      Notes:              Problem Priority Last Modified     Disease Progression (Heart Failure) --  9/25/2023  2:43 PM by Bushra Coy RN              Goal Recent Progress Last Modified     Comorbidities Identified and Managed --  9/25/2023  2:43 PM by Bushra Coy RN     Evidence-based guidance:   Assess and address signs/symptoms of comorbidity, including dyslipidemia, diabetes, iron deficiency, gout, arthritis, dysrhythmia, hypertension, cachexia, coronary artery disease, kidney dysfunction and lung disease.   Prepare patient for laboratory and diagnostic exams based on risk and presentation.   Prepare for use of pharmacologic therapy that may include statin, angiotensin converting enzyme (ACE) inhibitor, angiotensin receptor blocker (ARB), beta-blocker, digoxin, antidysrhythmic, diuretic or omega-3 fatty acid.   Monitor side effects and anticipate need for periodic adjustments.   Prepare patient for potential invasive treatment, such as implantable cardioverter-defibrillator, cardiac resynchronization therapy  or heart transplant as disease progresses.    Notes:            Task Due Date Last Modified     Identify and Manage Comorbidities --  9/25/2023  2:43 PM by Bushra Coy RN     Care Management Activities:      - not discussed during this outreach      Notes:            Goal Recent Progress Last Modified     Health Optimized --  9/25/2023  2:43 PM by Bushra Coy, RN     Evidence-based guidance:   Use brief intervention, such as 5 A's (Ask, Advise, Assess, Assist, Arrange) to encourage smoking cessation; refer to smoking cessation program, if ready for more intensive intervention.   Perform or refer to a registered dietitian for a nutrition assessment and nutrition-focused physical exam.    Identify potential micronutrient deficiencies, such as iron, vitamin D and thiamin.   Assess need for potential diet and fluid modification, such as reduced sodium or fluid intake.   Minimize unnecessary dietary restrictions to increase oral intake. Note: Sodium restriction should be individualized to the patient and clinical status.   Facilitate home monitoring of weight.    Notes:            Task Due Date Last Modified     Optimize Health --  9/25/2023  2:43 PM by Bushra Coy RN     Care Management Activities:      - not discussed during this outreach      Notes:              Problem Priority Last Modified     Activity Tolerance (Heart Failure) --  9/25/2023  2:43 PM by Bushra Coy RN              Goal Recent Progress Last Modified     Activity Tolerance Optimized --  9/25/2023  2:43 PM by Bushra Coy RN     Evidence-based guidance:   Promote daily physical activity that improves functional ability, cognition and quality of life.   Encourage reduction in sedentary time.    Encourage optimal, safe functional mobility and self-care performance based on ability and tolerance.    Promote breathing and energy conservation techniques, such as pursed-lip breathing, preplanning and pacing of activity, balancing activity  and rest.   Encourage participation in cardiac rehabilitation services.    Notes:            Task Due Date Last Modified     Maintain Strength and Functional Ability --  9/25/2023  2:43 PM by Bushra Coy RN     Care Management Activities:      - not discussed during this outreach      Notes:              Problem Priority Last Modified     Obstructive Sleep Apnea (Heart Failure) --  9/25/2023  2:43 PM by Bushra Coy RN              Goal Recent Progress Last Modified     Effective Breathing Pattern During Sleep --  9/25/2023  2:43 PM by Bushra Coy RN     Evidence-based guidance:   Use a validated screening tool to identify risk for obstructive sleep apnea (GUTIERREZ).   Encourage a nonsupine sleep position, such as side-lying, head of bed elevated, multiple pillows, to prevent airway collapse.   Encourage the use of sleep hygiene techniques.   Anticipate a referral for sleep study (polysomnography).   If GUTIERREZ is identified, prepare patient for treatment options, such as nighttime oxygen therapy and CPAP (continuous positive airway pressure) or BiPAP (bilevel positive airway pressure).    Notes:            Task Due Date Last Modified     Monitor and Manage Obstructive Sleep Apnea (GUTIERREZ) --  9/25/2023  2:43 PM by Bushra Coy RN     Care Management Activities:      - not discussed during this outreach      Notes:                        Current Specialty Plan of Care Status signed by both patient and provider    Instructions   Patient was provided an electronic copy of care plan  CCM services were explained and offered and patient has accepted these services.  Patient has given their written consent to receive CCM services and understands that this includes the authorization of electronic communication of medical information with the other treating providers.  Patient understands that they may stop CCM services at any time and these changes will be effective at the end of the calendar month and will effectively  revocate the agreement of CCM services.  Patient understands that only one practitioner can furnish and be paid for CCM services during one calendar month.  Patient also understands that there may be co-payment or deductible fees in association with CCM services.  Patient will continue with at least monthly follow-up calls with the Ambulatory .    Bushra CHEN  Ambulatory Case Management    9/29/2023, 10:49 EDT

## 2023-09-29 NOTE — TELEPHONE ENCOUNTER
Spoke with Ritu, she is aware of instructions and has picked up the medication from the pharmacy.

## 2023-09-29 NOTE — OUTREACH NOTE
CHF Week 3 Survey      Flowsheet Row Responses   [facility] patient discharged from? [place]   Does the patient have one of the following disease processes/diagnoses(primary or secondary)? CHF   Week 3 attempt successful? Yes   Call start time 1507   Call end time 1517   Discharge diagnosis A/C CHF, severe dilated cardiomyopathy   Meds reviewed with patient/caregiver? Yes   Is the patient taking all medications as directed (includes completed medication regime)? Yes   Has the patient kept scheduled appointments due by today? Yes   Has home health visited the patient within 72 hours of discharge? Yes   DME comments Pt wearing Lifevest. Pt reports he has not received the cane, BP monitor or pulse ox   Psychosocial issues? No   Comments Pt reports occasional SOA and chest pain occur, when over exerting which resolves with rest.Pt denies syncope. Pt had episode of light sensitivity today lasting 4min, resolved w/rest.   What is the patient's perception of their health status since discharge? Improving   Nursing interventions Nurse provided patient education   Is the patient/caregiver able to teach back the hierarchy of who to call/visit for symptoms/problems? PCP, Specialist, Home health nurse, Urgent Care, ED, 911 Yes   CHF Zone this Call Green Zone   Green Zone No new or worsening shortness of breath, Weight check stable, Physical activity level is normal for you   Green Zone Interventions Daily weight check, Meds as directed, Follow up visits planned   CHF Week 3 call completed? Yes   Graduated Yes   Is the patient interested in additional calls from an ambulatory ? No   Call end time 1517            Raisa ELIAS - Registered Nurse

## 2023-10-04 ENCOUNTER — PATIENT OUTREACH (OUTPATIENT)
Dept: CASE MANAGEMENT | Facility: OTHER | Age: 53
End: 2023-10-04
Payer: COMMERCIAL

## 2023-10-04 DIAGNOSIS — I50.43 ACUTE ON CHRONIC COMBINED SYSTOLIC AND DIASTOLIC CONGESTIVE HEART FAILURE: ICD-10-CM

## 2023-10-04 DIAGNOSIS — I50.22 CHRONIC HFREF (HEART FAILURE WITH REDUCED EJECTION FRACTION): Primary | ICD-10-CM

## 2023-10-04 DIAGNOSIS — K70.31 ASCITES DUE TO ALCOHOLIC CIRRHOSIS: ICD-10-CM

## 2023-10-04 DIAGNOSIS — K70.31 ALCOHOLIC CIRRHOSIS OF LIVER WITH ASCITES: ICD-10-CM

## 2023-10-04 RX ORDER — HYDROXYZINE PAMOATE 25 MG/1
25 CAPSULE ORAL 3 TIMES DAILY PRN
Qty: 90 CAPSULE | Refills: 5 | Status: SHIPPED | OUTPATIENT
Start: 2023-10-04 | End: 2023-10-11

## 2023-10-04 RX ORDER — DAPAGLIFLOZIN 10 MG/1
10 TABLET, FILM COATED ORAL DAILY
Qty: 30 TABLET | Refills: 5 | Status: SHIPPED | OUTPATIENT
Start: 2023-10-04 | End: 2023-11-03

## 2023-10-04 RX ORDER — DAPAGLIFLOZIN 10 MG/1
10 TABLET, FILM COATED ORAL DAILY
Qty: 90 TABLET | Refills: 2 | Status: CANCELLED | OUTPATIENT
Start: 2023-10-04

## 2023-10-04 RX ORDER — HYDROXYZINE PAMOATE 25 MG/1
25 CAPSULE ORAL 3 TIMES DAILY PRN
Qty: 21 CAPSULE | Refills: 0 | Status: CANCELLED | OUTPATIENT
Start: 2023-10-04 | End: 2023-10-11

## 2023-10-04 NOTE — OUTREACH NOTE
AMBULATORY CASE MANAGEMENT NOTE    Name and Relationship of Patient/Support Person: [facility] - Other    CCM Interim Update    Per chart review he was seen at [facility] for the liver transplant team and they discussed a potential plan for heart and liver transplant per notes from 9/27/23 visit with Dr. Blaise Ag. They are discussing if liver transplant is warranted with the heart transplant and work up to be done with labs and he has had discussion with Dr. Miguel Li from heart transplant team. He appears to have a transplant coordinator but no name is listed. Dr. Ag appears to be wanting social work, neurology, and hematology work ups as well. Labs have been ordered and discussed in notes for EGD, liver CT, and repeat ECHO to be done. Did not notice a plan for follow up. Will outreach to transplant team to see if they would need records from us.    PCP follow up 9/28/23 and he is to follow up with Dr. Doe again 3 months and appointment for 12/29/2023 noted. Reviewed medication list from specialist and our current list only discrepancy is Paxil vs Cymbalta but PCP had just changed that medication the day prior to seeing specialist. There is also Vit B 1 on his [facility] list but not ours. Will clarify with patient    Care Coordination    Call to [facility] [phone] and  transferred to nurse line with no answer so message left on voicemail but unsure if it was actual nurse line. Requested call back to my work cell. I will also contact patient to see if he has a coordinator name and number.     Spoke with patient and his aunt Kanchan as he is in need of refills. They have called to return a call to angel Izquierdo for an appointment needed to be seen today in the [place] office she thinks it is for Dr. Li but has called several times to [facility] and other numbers and no one reports calling them or aware of need for  follow up today. She reports she has business cards for Beckie Foster the  at [phone] and her number reports she is out and for needs call [facility] at [phone]. I called the other number given to me [phone] and this is [facility] office. He has no appointments with them and she did not see anything scheduled for him today with any of the [facility] group. I called the heart failure clinic number Kanchan his aunt gave me at [phone] and Marry reports nothing scheduled with them and she is in [place] where Dr. Li is working in clinic today. She reports she also spoke with Angelia who may outreach to patients and she has not called them either. She gave me Dr. Li's cell to outreach to him so I contacted him at [phone] and he reports he is not in [place] on Wednesdays and only certain Thursdays and he does not have an appointment with patient today he knows for sure. Per Marry he does have a CT body and Echo scheduled for 10/26/23 at 2:50 pm at [facility].     16:47 called patient back and spoke with him and his aunt and reinforced his medications are not sent in yet but PCP is off today. He reports he is out of his Digoxin and I reinforced they did not say he was low on it today, then we realized it was actually the hydroxyzine he is out of and he has plenty of Dig. She also reports it is the provider or nurse from the Liver transplant team who called him. I will reach out to them tomorrow. She reports the Cymbalta only had 14 tablets. I told her it was sent for 30 with 5 refills and she will call the pharmacy to clarify on their side.                 Bushra CHEN  Ambulatory Case Management    10/4/2023, 10:05 EDT

## 2023-10-05 ENCOUNTER — PATIENT OUTREACH (OUTPATIENT)
Dept: CASE MANAGEMENT | Facility: OTHER | Age: 53
End: 2023-10-05
Payer: COMMERCIAL

## 2023-10-05 DIAGNOSIS — K70.31 ALCOHOLIC CIRRHOSIS OF LIVER WITH ASCITES: ICD-10-CM

## 2023-10-05 DIAGNOSIS — I50.22 CHRONIC HFREF (HEART FAILURE WITH REDUCED EJECTION FRACTION): Primary | ICD-10-CM

## 2023-10-05 DIAGNOSIS — K70.31 ASCITES DUE TO ALCOHOLIC CIRRHOSIS: ICD-10-CM

## 2023-10-05 RX ORDER — ASPIRIN 81 MG/1
81 TABLET ORAL DAILY
Qty: 90 TABLET | Refills: 1 | Status: SHIPPED | OUTPATIENT
Start: 2023-10-05

## 2023-10-05 RX ORDER — SPIRONOLACTONE 25 MG/1
25 TABLET ORAL DAILY
Qty: 90 TABLET | Refills: 1 | Status: SHIPPED | OUTPATIENT
Start: 2023-10-05

## 2023-10-05 RX ORDER — METOPROLOL SUCCINATE 25 MG/1
25 TABLET, EXTENDED RELEASE ORAL NIGHTLY
Qty: 90 TABLET | Refills: 1 | Status: SHIPPED | OUTPATIENT
Start: 2023-10-05

## 2023-10-05 RX ORDER — FUROSEMIDE 40 MG/1
40 TABLET ORAL DAILY
Qty: 90 TABLET | Refills: 1 | Status: SHIPPED | OUTPATIENT
Start: 2023-10-05

## 2023-10-05 RX ORDER — SACUBITRIL AND VALSARTAN 24; 26 MG/1; MG/1
1 TABLET, FILM COATED ORAL 2 TIMES DAILY
Qty: 180 TABLET | Refills: 1 | Status: SHIPPED | OUTPATIENT
Start: 2023-10-05

## 2023-10-05 RX ORDER — LEVOTHYROXINE SODIUM 0.1 MG/1
100 TABLET ORAL
Qty: 90 TABLET | Refills: 1 | Status: SHIPPED | OUTPATIENT
Start: 2023-10-05

## 2023-10-05 NOTE — OUTREACH NOTE
AMBULATORY CASE MANAGEMENT NOTE    Name and Relationship of Patient/Support Person: nanisophia - Emergency Contact    CCM Interim Update    Called to his aunt per his request yesterday and informed PCP has sent in his medications this morning around 7 am and she verbalizes understanding and appreciation for support.     Care Coordination    16:13: Called Mary the  for [facility] team and left her a voicemail to see if she can call me back about patient and his aunt's concerns about what they are discussing for his needs with sobriety and rehab needs. She feels like they are saying he has to go into an alcohol program for 6 months and he is not able to stay inpatient like that due to his life vest. This is in regards to something that was left on his phone.           Bushra CHEN  Ambulatory Case Management    10/5/2023, 10:29 EDT

## 2023-10-11 ENCOUNTER — PATIENT OUTREACH (OUTPATIENT)
Dept: CASE MANAGEMENT | Facility: OTHER | Age: 53
End: 2023-10-11
Payer: COMMERCIAL

## 2023-10-11 DIAGNOSIS — K70.31 ASCITES DUE TO ALCOHOLIC CIRRHOSIS: ICD-10-CM

## 2023-10-11 DIAGNOSIS — I50.22 CHRONIC HFREF (HEART FAILURE WITH REDUCED EJECTION FRACTION): Primary | ICD-10-CM

## 2023-10-11 DIAGNOSIS — K70.31 ALCOHOLIC CIRRHOSIS OF LIVER WITH ASCITES: ICD-10-CM

## 2023-10-11 NOTE — OUTREACH NOTE
AMBULATORY CASE MANAGEMENT NOTE    Name and Relationship of Patient/Support Person: sophia cardoza - Emergency Contact    CCM Interim Update    Called and spoke with Kanchan Cardoza his aunt and informed her I have no heard back from the [facility]plant team  and she reports he has follow up with Dr. Li on 10/26/23 and then goes to have a CT same day in [place] and she has been informed that he has to be 6 months sober before he will even be considered for transplant and he has not had any alcohol since July. She is trying to get him into [facility] for outpatient rehab. He did get all his refilled medications delivered from [facility].             Bushra CHEN  Ambulatory Case Management    10/11/2023, 15:27 EDT

## 2023-10-12 ENCOUNTER — TELEPHONE (OUTPATIENT)
Dept: FAMILY MEDICINE CLINIC | Facility: CLINIC | Age: 53
End: 2023-10-12

## 2023-10-12 DIAGNOSIS — F41.9 ANXIETY: Primary | ICD-10-CM

## 2023-10-12 NOTE — TELEPHONE ENCOUNTER
Caller: nanisophia    Relationship: Emergency Contact    Best call back number: [phone]     What is the medical concern/diagnosis: BEHAVIORAL HEALTH     What specialty or service is being requested: BEHAVIORAL HEALTH     What is the provider, practice or medical service name: [facility]    What is the office location: [facility] [address], [place] [address]    What is the office phone number: [phone]    Any additional details: PATIENT AUNT IS CALLING REQUESTING FOR A REFERRAL TO BEHAVIORAL PRIOR TO THE PATIENT HAVING PROCEDURES DONE.

## 2023-10-14 ENCOUNTER — HOSPITAL ENCOUNTER (EMERGENCY)
Facility: HOSPITAL | Age: 53
Discharge: HOME | End: 2023-10-14
Attending: EMERGENCY MEDICINE
Payer: COMMERCIAL

## 2023-10-14 ENCOUNTER — APPOINTMENT (OUTPATIENT)
Dept: GENERAL RADIOLOGY | Facility: HOSPITAL | Age: 53
End: 2023-10-14
Payer: COMMERCIAL

## 2023-10-14 VITALS
HEART RATE: 59 BPM | SYSTOLIC BLOOD PRESSURE: 113 MMHG | RESPIRATION RATE: 16 BRPM | DIASTOLIC BLOOD PRESSURE: 70 MMHG | BODY MASS INDEX: 23.47 KG/M2 | WEIGHT: 140.87 LBS | TEMPERATURE: 97.7 F | OXYGEN SATURATION: 96 % | HEIGHT: 65 IN

## 2023-10-14 DIAGNOSIS — M54.41 ACUTE BILATERAL LOW BACK PAIN WITH BILATERAL SCIATICA: ICD-10-CM

## 2023-10-14 DIAGNOSIS — M62.830 LUMBAR PARASPINAL MUSCLE SPASM: ICD-10-CM

## 2023-10-14 DIAGNOSIS — M54.42 ACUTE BILATERAL LOW BACK PAIN WITH BILATERAL SCIATICA: ICD-10-CM

## 2023-10-14 DIAGNOSIS — S39.012A STRAIN OF LUMBAR REGION, INITIAL ENCOUNTER: Primary | ICD-10-CM

## 2023-10-14 LAB
ALBUMIN SERPL-MCNC: 3.9 G/DL (ref 3.5–5.2)
ALBUMIN/GLOB SERPL: 1.1 G/DL
ALP SERPL-CCNC: 133 U/L (ref 39–117)
ALT SERPL W P-5'-P-CCNC: 13 U/L (ref 1–41)
ANION GAP SERPL CALCULATED.3IONS-SCNC: 8.3 MMOL/L (ref 5–15)
AST SERPL-CCNC: 16 U/L (ref 1–40)
BASOPHILS # BLD AUTO: 0.06 10*3/MM3 (ref 0–0.2)
BASOPHILS NFR BLD AUTO: 0.6 % (ref 0–1.5)
BILIRUB SERPL-MCNC: 0.2 MG/DL (ref 0–1.2)
BILIRUB UR QL STRIP: NEGATIVE
BUN SERPL-MCNC: 17 MG/DL (ref 6–20)
BUN/CREAT SERPL: 22.1 (ref 7–25)
CALCIUM SPEC-SCNC: 9.4 MG/DL (ref 8.6–10.5)
CHLORIDE SERPL-SCNC: 100 MMOL/L (ref 98–107)
CLARITY UR: CLEAR
CO2 SERPL-SCNC: 26.7 MMOL/L (ref 22–29)
COLOR UR: YELLOW
CREAT SERPL-MCNC: 0.77 MG/DL (ref 0.76–1.27)
DEPRECATED RDW RBC AUTO: 49.3 FL (ref 37–54)
EGFRCR SERPLBLD CKD-EPI 2021: 108.4 ML/MIN/1.73
EOSINOPHIL # BLD AUTO: 0.17 10*3/MM3 (ref 0–0.4)
EOSINOPHIL NFR BLD AUTO: 1.6 % (ref 0.3–6.2)
ERYTHROCYTE [DISTWIDTH] IN BLOOD BY AUTOMATED COUNT: 13.6 % (ref 12.3–15.4)
GLOBULIN UR ELPH-MCNC: 3.6 GM/DL
GLUCOSE SERPL-MCNC: 94 MG/DL (ref 65–99)
GLUCOSE UR STRIP-MCNC: NEGATIVE MG/DL
HCT VFR BLD AUTO: 26.7 % (ref 37.5–51)
HEMOCCULT STL QL IA: NEGATIVE
HGB BLD-MCNC: 8.7 G/DL (ref 13–17.7)
HGB UR QL STRIP.AUTO: NEGATIVE
HOLD SPECIMEN: NORMAL
HOLD SPECIMEN: NORMAL
IMM GRANULOCYTES # BLD AUTO: 0.04 10*3/MM3 (ref 0–0.05)
IMM GRANULOCYTES NFR BLD AUTO: 0.4 % (ref 0–0.5)
KETONES UR QL STRIP: NEGATIVE
LEUKOCYTE ESTERASE UR QL STRIP.AUTO: NEGATIVE
LYMPHOCYTES # BLD AUTO: 2.08 10*3/MM3 (ref 0.7–3.1)
LYMPHOCYTES NFR BLD AUTO: 20.2 % (ref 19.6–45.3)
MCH RBC QN AUTO: 32.3 PG (ref 26.6–33)
MCHC RBC AUTO-ENTMCNC: 32.6 G/DL (ref 31.5–35.7)
MCV RBC AUTO: 99.3 FL (ref 79–97)
MONOCYTES # BLD AUTO: 1.24 10*3/MM3 (ref 0.1–0.9)
MONOCYTES NFR BLD AUTO: 12 % (ref 5–12)
NEUTROPHILS NFR BLD AUTO: 6.73 10*3/MM3 (ref 1.7–7)
NEUTROPHILS NFR BLD AUTO: 65.2 % (ref 42.7–76)
NITRITE UR QL STRIP: NEGATIVE
NRBC BLD AUTO-RTO: 0 /100 WBC (ref 0–0.2)
PH UR STRIP.AUTO: 6.5 [PH] (ref 5–8)
PLATELET # BLD AUTO: 319 10*3/MM3 (ref 140–450)
PMV BLD AUTO: 7.6 FL (ref 6–12)
POTASSIUM SERPL-SCNC: 4 MMOL/L (ref 3.5–5.2)
PROT SERPL-MCNC: 7.5 G/DL (ref 6–8.5)
PROT UR QL STRIP: NEGATIVE
RBC # BLD AUTO: 2.69 10*6/MM3 (ref 4.14–5.8)
SODIUM SERPL-SCNC: 135 MMOL/L (ref 136–145)
SP GR UR STRIP: 1.02 (ref 1–1.03)
UROBILINOGEN UR QL STRIP: NORMAL
WBC NRBC COR # BLD: 10.32 10*3/MM3 (ref 3.4–10.8)
WHOLE BLOOD HOLD COAG: NORMAL
WHOLE BLOOD HOLD SPECIMEN: NORMAL

## 2023-10-14 PROCEDURE — 99283 EMERGENCY DEPT VISIT LOW MDM: CPT

## 2023-10-14 PROCEDURE — 25010000002 KETOROLAC TROMETHAMINE PER 15 MG: Performed by: EMERGENCY MEDICINE

## 2023-10-14 PROCEDURE — 81003 URINALYSIS AUTO W/O SCOPE: CPT | Performed by: EMERGENCY MEDICINE

## 2023-10-14 PROCEDURE — 96360 HYDRATION IV INFUSION INIT: CPT

## 2023-10-14 PROCEDURE — 25010000002 DEXAMETHASONE SODIUM PHOSPHATE 10 MG/ML SOLUTION: Performed by: EMERGENCY MEDICINE

## 2023-10-14 PROCEDURE — 96372 THER/PROPH/DIAG INJ SC/IM: CPT

## 2023-10-14 PROCEDURE — 80053 COMPREHEN METABOLIC PANEL: CPT | Performed by: EMERGENCY MEDICINE

## 2023-10-14 PROCEDURE — 72100 X-RAY EXAM L-S SPINE 2/3 VWS: CPT

## 2023-10-14 PROCEDURE — 25810000003 SODIUM CHLORIDE 0.9 % SOLUTION: Performed by: NURSE PRACTITIONER

## 2023-10-14 PROCEDURE — 85025 COMPLETE CBC W/AUTO DIFF WBC: CPT | Performed by: EMERGENCY MEDICINE

## 2023-10-14 PROCEDURE — 82274 ASSAY TEST FOR BLOOD FECAL: CPT | Performed by: NURSE PRACTITIONER

## 2023-10-14 RX ORDER — DEXAMETHASONE SODIUM PHOSPHATE 10 MG/ML
10 INJECTION, SOLUTION INTRAMUSCULAR; INTRAVENOUS ONCE
Status: COMPLETED | OUTPATIENT
Start: 2023-10-14 | End: 2023-10-14

## 2023-10-14 RX ORDER — KETOROLAC TROMETHAMINE 30 MG/ML
30 INJECTION, SOLUTION INTRAMUSCULAR; INTRAVENOUS ONCE
Status: COMPLETED | OUTPATIENT
Start: 2023-10-14 | End: 2023-10-14

## 2023-10-14 RX ORDER — CYCLOBENZAPRINE HCL 10 MG
10 TABLET ORAL 3 TIMES DAILY PRN
Qty: 15 TABLET | Refills: 0 | Status: SHIPPED | OUTPATIENT
Start: 2023-10-14

## 2023-10-14 RX ORDER — HYDROCODONE BITARTRATE AND ACETAMINOPHEN 10; 325 MG/1; MG/1
1 TABLET ORAL ONCE
Status: COMPLETED | OUTPATIENT
Start: 2023-10-14 | End: 2023-10-14

## 2023-10-14 RX ORDER — SODIUM CHLORIDE 0.9 % (FLUSH) 0.9 %
10 SYRINGE (ML) INJECTION AS NEEDED
Status: DISCONTINUED | OUTPATIENT
Start: 2023-10-14 | End: 2023-10-14 | Stop reason: HOSPADM

## 2023-10-14 RX ORDER — PREDNISONE 20 MG/1
20 TABLET ORAL 2 TIMES DAILY
Qty: 6 TABLET | Refills: 0 | Status: SHIPPED | OUTPATIENT
Start: 2023-10-14 | End: 2023-10-17

## 2023-10-14 RX ORDER — HYDROCODONE BITARTRATE AND ACETAMINOPHEN 5; 325 MG/1; MG/1
1 TABLET ORAL EVERY 6 HOURS PRN
Qty: 9 TABLET | Refills: 0 | Status: SHIPPED | OUTPATIENT
Start: 2023-10-14

## 2023-10-14 RX ADMIN — DEXAMETHASONE SODIUM PHOSPHATE 10 MG: 10 INJECTION INTRAMUSCULAR; INTRAVENOUS at 12:23

## 2023-10-14 RX ADMIN — HYDROCODONE BITARTRATE AND ACETAMINOPHEN 1 TABLET: 10; 325 TABLET ORAL at 12:23

## 2023-10-14 RX ADMIN — KETOROLAC TROMETHAMINE 30 MG: 60 INJECTION, SOLUTION INTRAMUSCULAR at 12:23

## 2023-10-14 RX ADMIN — SODIUM CHLORIDE 1000 ML: 9 INJECTION, SOLUTION INTRAVENOUS at 12:23

## 2023-10-14 NOTE — ED PROVIDER NOTES
Time: 2:08 PM EDT  Date of encounter:  10/14/2023  Independent Historian/Clinical History and Information was obtained by:   {Blank multiple:31375}    History is limited by: {Limited History:34326}    Chief Complaint: ***      History of Present Illness:  Patient is a 53 y.o. year old male who presents to the emergency department for evaluation of ***    HPI    Patient Care Team  Primary Care Provider: Bertha Doe DO    Past Medical History:     No Known Allergies  Past Medical History:   Diagnosis Date    Arthritis     CHF (congestive heart failure)     Cirrhosis      Past Surgical History:   Procedure Laterality Date    APPENDECTOMY      CARDIAC CATHETERIZATION N/A 8/25/2023    Procedure: Left Heart Cath with possible coronary angioplasty;  Surgeon: Jomar Lynch MD;  Location: [facility] CATH INVASIVE LOCATION;  Service: Cardiology;  Laterality: N/A;     History reviewed. No pertinent family history.    Home Medications:  Prior to Admission medications    Medication Sig Start Date End Date Taking? Authorizing Provider   aspirin 81 MG EC tablet Take 1 tablet by mouth Daily. 10/5/23   Bertha Doe DO   B Complex Vitamins (B COMPLEX 1 PO) Take 1 tablet by mouth Daily.    Provider, MD Gertrudis   cyclobenzaprine (FLEXERIL) 10 MG tablet Take 1 tablet by mouth 3 (Three) Times a Day As Needed for Muscle Spasms. 10/14/23   Dyana Nelson APRN   dapagliflozin Propanediol (Farxiga) 10 MG tablet Take 1 tablet by mouth Daily for 30 days. 10/4/23 11/3/23  Bertha Doe DO   digoxin (LANOXIN) 250 MCG tablet Take 1 tablet by mouth Daily. 9/5/23   Lani Galeas MD   DULoxetine (Cymbalta) 30 MG capsule Take 1 capsule by mouth Daily. 9/27/23   Bertha Doe DO   furosemide (LASIX) 40 MG tablet Take 1 tablet by mouth Daily. 10/5/23   Bertha Doe DO   hydrOXYzine pamoate (Vistaril) 25 MG capsule Take 1 capsule by mouth 3 (Three) Times a Day As Needed for Itching for up to 7 days. 10/4/23 10/11/23  Bertha Doe  "DO   levocetirizine (XYZAL) 5 MG tablet Take 1 tablet by mouth Every Evening.    Provider, MD Gertrudis   levothyroxine (SYNTHROID, LEVOTHROID) 100 MCG tablet Take 1 tablet by mouth Every Morning. 10/5/23   Bertha Doe DO   metoprolol succinate XL (TOPROL-XL) 25 MG 24 hr tablet Take 1 tablet by mouth Every Night. 10/5/23   Bertha Doe DO   Multiple Vitamin (multivitamin) capsule Take 1 capsule by mouth Daily.    ProviderGertrudis MD   predniSONE (DELTASONE) 20 MG tablet Take 1 tablet by mouth 2 (Two) Times a Day for 3 days. 10/14/23 10/17/23  Dayna Nelson APRN   sacubitril-valsartan (Entresto) 24-26 MG tablet Take 1 tablet by mouth 2 (Two) Times a Day. 10/5/23   Bertha Doe DO   spironolactone (ALDACTONE) 25 MG tablet Take 1 tablet by mouth Daily. 10/5/23   Bertha Doe DO   thiamine (VITAMIN B-1) 100 MG tablet  tablet Take 1 tablet by mouth Daily. 10/5/23   Bertha Doe DO   Vericiguat (Verquvo) 2.5 MG tablet Take 1 tablet by mouth Daily. 9/5/23   Lani Galeas MD   Vericiguat (Verquvo) 2.5 MG tablet Take 1 tablet by mouth Daily. Indications: [id number] exp 2/21/2025 9/5/23   Lani Galeas MD        Social History:   Social History     Tobacco Use    Smoking status: Every Day     Packs/day: 0.50     Years: 33.00     Additional pack years: 0.00     Total pack years: 16.50     Types: Cigarettes     Start date: 10/1993    Smokeless tobacco: Current     Types: Snuff   Vaping Use    Vaping Use: Never used   Substance Use Topics    Alcohol use: Not Currently     Comment: history of alcohol abuse    Drug use: Not Currently     Types: Marijuana, Cocaine(coke)         Review of Systems:  Review of Systems     Physical Exam:  /70   Pulse 72   Temp 97.7 øF (36.5 øC) (Oral)   Resp 16   Ht 165.1 cm (65\")   Wt 63.9 kg (140 lb 14 oz)   SpO2 96%   BMI 23.44 kg/mý     Physical Exam   ***          Procedures:  Procedures      Medical Decision Making:      Comorbidities that affect " care:    {Comorbidities that affect care:14468}    External Notes reviewed:    {External Note review (Optional):88802}      The following orders were placed and all results were independently analyzed by me:  Orders Placed This Encounter   Procedures    XR Spine Lumbar 2 or 3 View    [place] Draw    Comprehensive Metabolic Panel    CBC Auto Differential    Occult Blood, Fecal By Immunoassay - Stool, Per Rectum    Urinalysis With Microscopic If Indicated (No Culture) - Urine, Clean Catch    Insert peripheral IV    CBC & Differential    Green Top (Gel)    Lavender Top    Gold Top - SST    Light Blue Top       Medications Given in the Emergency Department:  Medications   sodium chloride 0.9 % flush 10 mL (has no administration in time range)   dexAMETHasone sodium phosphate injection 10 mg (10 mg Intramuscular Given 10/14/23 1223)   ketorolac (TORADOL) injection 30 mg (30 mg Intramuscular Given 10/14/23 1223)   HYDROcodone-acetaminophen (NORCO)  MG per tablet 1 tablet (1 tablet Oral Given 10/14/23 1223)   sodium chloride 0.9 % bolus 1,000 mL (0 mL Intravenous Stopped 10/14/23 1358)        ED Course:         Labs:    Lab Results (last 24 hours)       Procedure Component Value Units Date/Time    Comprehensive Metabolic Panel [[id number]]  (Abnormal) Collected: 10/14/23 1050    Specimen: Blood Updated: 10/14/23 1125     Glucose 94 mg/dL      BUN 17 mg/dL      Creatinine 0.77 mg/dL      Sodium 135 mmol/L      Potassium 4.0 mmol/L      Chloride 100 mmol/L      CO2 26.7 mmol/L      Calcium 9.4 mg/dL      Total Protein 7.5 g/dL      Albumin 3.9 g/dL      ALT (SGPT) 13 U/L      AST (SGOT) 16 U/L      Alkaline Phosphatase 133 U/L      Total Bilirubin 0.2 mg/dL      Globulin 3.6 gm/dL      A/G Ratio 1.1 g/dL      BUN/Creatinine Ratio 22.1     Anion Gap 8.3 mmol/L      eGFR 108.4 mL/min/1.73     Narrative:      GFR Normal >60  Chronic Kidney Disease <60  Kidney Failure <15      CBC & Differential [[id number]]  (Abnormal)  Collected: 10/14/23 1050    Specimen: Blood Updated: 10/14/23 1058    Narrative:      The following orders were created for panel order CBC & Differential.  Procedure                               Abnormality         Status                     ---------                               -----------         ------                     CBC Auto Differential[[id number]]        Abnormal            Final result                 Please view results for these tests on the individual orders.    CBC Auto Differential [[id number]]  (Abnormal) Collected: 10/14/23 1050    Specimen: Blood Updated: 10/14/23 1058     WBC 10.32 10*3/mm3      RBC 2.69 10*6/mm3      Hemoglobin 8.7 g/dL      Hematocrit 26.7 %      MCV 99.3 fL      MCH 32.3 pg      MCHC 32.6 g/dL      RDW 13.6 %      RDW-SD 49.3 fl      MPV 7.6 fL      Platelets 319 10*3/mm3      Neutrophil % 65.2 %      Lymphocyte % 20.2 %      Monocyte % 12.0 %      Eosinophil % 1.6 %      Basophil % 0.6 %      Immature Grans % 0.4 %      Neutrophils, Absolute 6.73 10*3/mm3      Lymphocytes, Absolute 2.08 10*3/mm3      Monocytes, Absolute 1.24 10*3/mm3      Eosinophils, Absolute 0.17 10*3/mm3      Basophils, Absolute 0.06 10*3/mm3      Immature Grans, Absolute 0.04 10*3/mm3      nRBC 0.0 /100 WBC     Urinalysis With Microscopic If Indicated (No Culture) - Urine, Clean Catch [[id number]]  (Normal) Collected: 10/14/23 1226    Specimen: Urine, Clean Catch Updated: 10/14/23 1240     Color, UA Yellow     Appearance, UA Clear     pH, UA 6.5     Specific Gravity, UA 1.020     Glucose, UA Negative     Ketones, UA Negative     Bilirubin, UA Negative     Blood, UA Negative     Protein, UA Negative     Leuk Esterase, UA Negative     Nitrite, UA Negative     Urobilinogen, UA 0.2 E.U./dL    Narrative:      Urine microscopic not indicated.    Occult Blood, Fecal By Immunoassay - Stool, Per Rectum [[id number]]  (Normal) Collected: 10/14/23 1229    Specimen: Stool from Per Rectum Updated: 10/14/23 1256      Occult Blood, Fecal by Immunoassay Negative             Imaging:    XR Spine Lumbar 2 or 3 View    Result Date: 10/14/2023  PROCEDURE: XR SPINE LUMBAR 2 OR 3 VW  COMPARISON: [facility], CT, CT ABDOMEN PELVIS WO CONTRAST, 8/24/2023, 16:24.  INDICATIONS: back pain.   LOW BACK PAIN FOR 2 DAYS.  FINDINGS:   BONES: Normal.  No significant spondylosis, scoliosis, fracture, or visible bony lesion.  DISC SPACES: Normal.  No significant disc height narrowing, subluxation, or endplate abnormality.  PARASPINOUS: Negative.  No paraspinous abnormality is seen.  OTHER: Atherosclerotic vascular calcification is present.  Old right 10th through 12th rib fractures.        No acute osseous abnormalities are identified in the lumbar spine.     VERONA MAC MD       Electronically Signed and Approved By: VERONA MAC MD on 10/14/2023 at 12:30                Differential Diagnosis and Discussion:    {Differentials:68617}    {Independent Review of (Optional):13169}    MDM     Amount and/or Complexity of Data Reviewed  Clinical lab tests: reviewed  Tests in the radiology section of CPTr: reviewed         {Critical Care:80387}    Patient Care Considerations:    {Considerations (Optional):30137}      Consultants/Shared Management Plan:    {Shared Management Plan (Optional):91143}    Social Determinants of Health:    {Social Determinants of Health (Optional):61612}      Disposition and Care Coordination:    {Admission consideration:95302}    {Discharge (Optional):85447}    Final diagnoses:   Strain of lumbar region, initial encounter   Lumbar paraspinal muscle spasm   Acute bilateral low back pain with bilateral sciatica        ED Disposition       ED Disposition   Discharge    Condition   Stable    Comment   --               This medical record created using voice recognition software.

## 2023-10-14 NOTE — ED PROVIDER NOTES
Time: 10:46 AM EDT  Date of encounter:  10/14/2023  Independent Historian/Clinical History and Information was obtained by:   Patient    History is limited by: N/A    Chief Complaint: back pain      History of Present Illness:  Patient is a 53 y.o. year old male who presents to the emergency department for evaluation of severe lower back pain for 3 days. He denies injury stating he just woke up with pain. He has a history of arthritis and cirrhosis and says that he has pain all the time but that this is worse than usual. He is ambulatory but says he has severe pain when trying to walk, sit or stand. The only relief he gets is lying down with knees bent. He denies loss of bowel/bladder control and there is no saddle anesthesia. He rates his pain '9/10'    HPI    Patient Care Team  Primary Care Provider: Bertha Doe DO    Past Medical History:     No Known Allergies  Past Medical History:   Diagnosis Date    Arthritis     CHF (congestive heart failure)     Cirrhosis      Past Surgical History:   Procedure Laterality Date    APPENDECTOMY      CARDIAC CATHETERIZATION N/A 8/25/2023    Procedure: Left Heart Cath with possible coronary angioplasty;  Surgeon: Jomar Lynch MD;  Location: [facility] CATH INVASIVE LOCATION;  Service: Cardiology;  Laterality: N/A;     History reviewed. No pertinent family history.    Home Medications:  Prior to Admission medications    Medication Sig Start Date End Date Taking? Authorizing Provider   aspirin 81 MG EC tablet Take 1 tablet by mouth Daily. 10/5/23   Bertha Doe DO   B Complex Vitamins (B COMPLEX 1 PO) Take 1 tablet by mouth Daily.    Provider, MD Gertrudis   dapagliflozin Propanediol (Farxiga) 10 MG tablet Take 1 tablet by mouth Daily for 30 days. 10/4/23 11/3/23  Bertha Doe DO   digoxin (LANOXIN) 250 MCG tablet Take 1 tablet by mouth Daily. 9/5/23   Lani Galeas MD   DULoxetine (Cymbalta) 30 MG capsule Take 1 capsule by mouth Daily. 9/27/23   Bertha Doe DO    furosemide (LASIX) 40 MG tablet Take 1 tablet by mouth Daily. 10/5/23   Bertha Doe DO   hydrOXYzine pamoate (Vistaril) 25 MG capsule Take 1 capsule by mouth 3 (Three) Times a Day As Needed for Itching for up to 7 days. 10/4/23 10/11/23  Bertha Doe DO   levocetirizine (XYZAL) 5 MG tablet Take 1 tablet by mouth Every Evening.    ProviderGertrudis MD   levothyroxine (SYNTHROID, LEVOTHROID) 100 MCG tablet Take 1 tablet by mouth Every Morning. 10/5/23   Bertha Doe DO   metoprolol succinate XL (TOPROL-XL) 25 MG 24 hr tablet Take 1 tablet by mouth Every Night. 10/5/23   Bertha Doe DO   Multiple Vitamin (multivitamin) capsule Take 1 capsule by mouth Daily.    ProviderGertrudis MD   sacubitril-valsartan (Entresto) 24-26 MG tablet Take 1 tablet by mouth 2 (Two) Times a Day. 10/5/23   Bertha Doe DO   spironolactone (ALDACTONE) 25 MG tablet Take 1 tablet by mouth Daily. 10/5/23   Bertha Doe DO   thiamine (VITAMIN B-1) 100 MG tablet  tablet Take 1 tablet by mouth Daily. 10/5/23   Bertha Doe DO   Vericiguat (Verquvo) 2.5 MG tablet Take 1 tablet by mouth Daily. 9/5/23   Lani Galeas MD   Vericiguat (Verquvo) 2.5 MG tablet Take 1 tablet by mouth Daily. Indications: [id number] exp 2/21/2025 9/5/23   Lani Galeas MD        Social History:   Social History     Tobacco Use    Smoking status: Every Day     Packs/day: 0.50     Years: 33.00     Additional pack years: 0.00     Total pack years: 16.50     Types: Cigarettes     Start date: 10/1993    Smokeless tobacco: Current     Types: Snuff   Vaping Use    Vaping Use: Never used   Substance Use Topics    Alcohol use: Not Currently     Comment: history of alcohol abuse    Drug use: Not Currently     Types: Marijuana, Cocaine(coke)         Review of Systems:  Review of Systems   Constitutional: Negative.    HENT: Negative.     Eyes: Negative.    Respiratory: Negative.     Cardiovascular: Negative.    Gastrointestinal: Negative.    Endocrine:  "Negative.    Genitourinary: Negative.    Musculoskeletal:  Positive for back pain.   Skin: Negative.    Allergic/Immunologic: Negative.    Neurological: Negative.    Hematological: Negative.    Psychiatric/Behavioral: Negative.          Physical Exam:  /70   Pulse 59   Temp 97.7 øF (36.5 øC) (Oral)   Resp 16   Ht 165.1 cm (65\")   Wt 63.9 kg (140 lb 14 oz)   SpO2 96%   BMI 23.44 kg/mý     Physical Exam  Constitutional:       Appearance: Normal appearance.   HENT:      Head: Normocephalic and atraumatic.      Nose: Nose normal.      Mouth/Throat:      Mouth: Mucous membranes are moist.   Eyes:      Pupils: Pupils are equal, round, and reactive to light.   Cardiovascular:      Rate and Rhythm: Normal rate and regular rhythm.      Pulses: Normal pulses.   Pulmonary:      Effort: Pulmonary effort is normal.      Breath sounds: Normal breath sounds.   Abdominal:      General: Abdomen is flat. Bowel sounds are normal.      Palpations: Abdomen is soft.   Musculoskeletal:         General: Normal range of motion.      Cervical back: Normal and normal range of motion.      Thoracic back: Normal.      Lumbar back: Spasms, tenderness and bony tenderness present. Positive right straight leg raise test and positive left straight leg raise test.   Skin:     General: Skin is warm and dry.      Capillary Refill: Capillary refill takes less than 2 seconds.   Neurological:      General: No focal deficit present.      Mental Status: He is alert and oriented to person, place, and time. Mental status is at baseline.   Psychiatric:         Mood and Affect: Mood normal.                  Procedures:  Procedures      Medical Decision Making:      Comorbidities that affect care:    Cirrhosis, Arthritis and CHF    External Notes reviewed:    None      The following orders were placed and all results were independently analyzed by me:  Orders Placed This Encounter   Procedures    XR Spine Lumbar 2 or 3 View    [place] Draw    " Comprehensive Metabolic Panel    CBC Auto Differential    Occult Blood, Fecal By Immunoassay - Stool, Per Rectum    Urinalysis With Microscopic If Indicated (No Culture) - Urine, Clean Catch    Insert peripheral IV    CBC & Differential    Green Top (Gel)    Lavender Top    Gold Top - SST    Light Blue Top       Medications Given in the Emergency Department:  Medications   sodium chloride 0.9 % flush 10 mL (has no administration in time range)   dexAMETHasone sodium phosphate injection 10 mg (10 mg Intramuscular Given 10/14/23 1223)   ketorolac (TORADOL) injection 30 mg (30 mg Intramuscular Given 10/14/23 1223)   HYDROcodone-acetaminophen (NORCO)  MG per tablet 1 tablet (1 tablet Oral Given 10/14/23 1223)   sodium chloride 0.9 % bolus 1,000 mL (0 mL Intravenous Stopped 10/14/23 1358)        ED Course:         Labs:    Lab Results (last 24 hours)       Procedure Component Value Units Date/Time    Comprehensive Metabolic Panel [[id number]]  (Abnormal) Collected: 10/14/23 1050    Specimen: Blood Updated: 10/14/23 1125     Glucose 94 mg/dL      BUN 17 mg/dL      Creatinine 0.77 mg/dL      Sodium 135 mmol/L      Potassium 4.0 mmol/L      Chloride 100 mmol/L      CO2 26.7 mmol/L      Calcium 9.4 mg/dL      Total Protein 7.5 g/dL      Albumin 3.9 g/dL      ALT (SGPT) 13 U/L      AST (SGOT) 16 U/L      Alkaline Phosphatase 133 U/L      Total Bilirubin 0.2 mg/dL      Globulin 3.6 gm/dL      A/G Ratio 1.1 g/dL      BUN/Creatinine Ratio 22.1     Anion Gap 8.3 mmol/L      eGFR 108.4 mL/min/1.73     Narrative:      GFR Normal >60  Chronic Kidney Disease <60  Kidney Failure <15      CBC & Differential [[id number]]  (Abnormal) Collected: 10/14/23 1050    Specimen: Blood Updated: 10/14/23 1058    Narrative:      The following orders were created for panel order CBC & Differential.  Procedure                               Abnormality         Status                     ---------                               -----------          ------                     CBC Auto Differential[[id number]]        Abnormal            Final result                 Please view results for these tests on the individual orders.    CBC Auto Differential [[id number]]  (Abnormal) Collected: 10/14/23 1050    Specimen: Blood Updated: 10/14/23 1058     WBC 10.32 10*3/mm3      RBC 2.69 10*6/mm3      Hemoglobin 8.7 g/dL      Hematocrit 26.7 %      MCV 99.3 fL      MCH 32.3 pg      MCHC 32.6 g/dL      RDW 13.6 %      RDW-SD 49.3 fl      MPV 7.6 fL      Platelets 319 10*3/mm3      Neutrophil % 65.2 %      Lymphocyte % 20.2 %      Monocyte % 12.0 %      Eosinophil % 1.6 %      Basophil % 0.6 %      Immature Grans % 0.4 %      Neutrophils, Absolute 6.73 10*3/mm3      Lymphocytes, Absolute 2.08 10*3/mm3      Monocytes, Absolute 1.24 10*3/mm3      Eosinophils, Absolute 0.17 10*3/mm3      Basophils, Absolute 0.06 10*3/mm3      Immature Grans, Absolute 0.04 10*3/mm3      nRBC 0.0 /100 WBC     Urinalysis With Microscopic If Indicated (No Culture) - Urine, Clean Catch [[id number]]  (Normal) Collected: 10/14/23 1226    Specimen: Urine, Clean Catch Updated: 10/14/23 1240     Color, UA Yellow     Appearance, UA Clear     pH, UA 6.5     Specific Gravity, UA 1.020     Glucose, UA Negative     Ketones, UA Negative     Bilirubin, UA Negative     Blood, UA Negative     Protein, UA Negative     Leuk Esterase, UA Negative     Nitrite, UA Negative     Urobilinogen, UA 0.2 E.U./dL    Narrative:      Urine microscopic not indicated.    Occult Blood, Fecal By Immunoassay - Stool, Per Rectum [[id number]]  (Normal) Collected: 10/14/23 1229    Specimen: Stool from Per Rectum Updated: 10/14/23 1256     Occult Blood, Fecal by Immunoassay Negative             Imaging:    XR Spine Lumbar 2 or 3 View    Result Date: 10/14/2023  PROCEDURE: XR SPINE LUMBAR 2 OR 3 VW  COMPARISON: [facility], CT, CT ABDOMEN PELVIS WO CONTRAST, 8/24/2023, 16:24.  INDICATIONS: back pain.   LOW BACK PAIN FOR 2 DAYS.   FINDINGS:   BONES: Normal.  No significant spondylosis, scoliosis, fracture, or visible bony lesion.  DISC SPACES: Normal.  No significant disc height narrowing, subluxation, or endplate abnormality.  PARASPINOUS: Negative.  No paraspinous abnormality is seen.  OTHER: Atherosclerotic vascular calcification is present.  Old right 10th through 12th rib fractures.        No acute osseous abnormalities are identified in the lumbar spine.     VERONA MAC MD       Electronically Signed and Approved By: VERONA MAC MD on 10/14/2023 at 12:30                Differential Diagnosis and Discussion:    Back Pain: The patient presents with back pain. My differential diagnosis includes but is not limited to acute spinal epidural abscess, acute spinal epidural bleed, cauda equina syndrome, abdominal aortic aneurysm, aortic dissection, kidney stone, pyelonephritis, musculoskeletal back pain, spinal fracture, and osteoarthritis.     All labs were reviewed and interpreted by me.  All X-rays impressions were independently interpreted by me.    MDM     Amount and/or Complexity of Data Reviewed  Clinical lab tests: reviewed             Patient Care Considerations:           Consultants/Shared Management Plan:    None    Social Determinants of Health:    Patient is independent, reliable, and has access to care.       Disposition and Care Coordination:    Discharged: The patient is suitable and stable for discharge with no need for consideration of observation or admission.    I have explained the patient's condition, diagnoses and treatment plan based on the information available to me at this time. I have answered questions and addressed any concerns. The patient has a good  understanding of the patient's diagnosis, condition, and treatment plan as can be expected at this point. The vital signs have been stable. The patient's condition is stable and appropriate for discharge from the emergency department.      The patient will  pursue further outpatient evaluation with the primary care physician or other designated or consulting physician as outlined in the discharge instructions. They are agreeable to this plan of care and follow-up instructions have been explained in detail. The patient has received these instructions in written format and have expressed an understanding of the discharge instructions. The patient is aware that any significant change in condition or worsening of symptoms should prompt an immediate return to this or the closest emergency department or call to 911.  I have explained discharge medications and the need for follow up with the patient/caretakers. This was also printed in the discharge instructions. Patient was discharged with the following medications and follow up:      Medication List        New Prescriptions      cyclobenzaprine 10 MG tablet  Commonly known as: FLEXERIL  Take 1 tablet by mouth 3 (Three) Times a Day As Needed for Muscle Spasms.     HYDROcodone-acetaminophen 5-325 MG per tablet  Commonly known as: NORCO  Take 1 tablet by mouth Every 6 (Six) Hours As Needed for Severe Pain.     predniSONE 20 MG tablet  Commonly known as: DELTASONE  Take 1 tablet by mouth 2 (Two) Times a Day for 3 days.               Where to Get Your Medications        These medications were sent to [id number] - [place], [place] - [address] AT [facility] [phone]  - [phone] FX  [address] [place] [address] [place] [address]      Phone: [phone]   cyclobenzaprine 10 MG tablet  HYDROcodone-acetaminophen 5-325 MG per tablet  predniSONE 20 MG tablet      No follow-up provider specified.     Final diagnoses:   Strain of lumbar region, initial encounter   Lumbar paraspinal muscle spasm   Acute bilateral low back pain with bilateral sciatica        ED Disposition       ED Disposition   Discharge    Condition   Stable    Comment   Patient discharged. Pharmacy verified by the  patient.                This medical record created using voice recognition software.             Dayna Nelson, APRN  10/14/23 6261
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
## 2023-10-27 ENCOUNTER — TELEPHONE (OUTPATIENT)
Dept: CASE MANAGEMENT | Facility: OTHER | Age: 53
End: 2023-10-27
Payer: COMMERCIAL

## 2023-10-27 ENCOUNTER — OFFICE VISIT (OUTPATIENT)
Dept: BEHAVIORAL HEALTH | Facility: CLINIC | Age: 53
End: 2023-10-27
Payer: COMMERCIAL

## 2023-10-27 VITALS
DIASTOLIC BLOOD PRESSURE: 55 MMHG | HEIGHT: 65 IN | WEIGHT: 145.8 LBS | HEART RATE: 72 BPM | BODY MASS INDEX: 24.29 KG/M2 | SYSTOLIC BLOOD PRESSURE: 95 MMHG

## 2023-10-27 DIAGNOSIS — F10.21 ALCOHOL USE DISORDER, SEVERE, IN EARLY REMISSION: Primary | ICD-10-CM

## 2023-10-27 DIAGNOSIS — F41.1 GENERALIZED ANXIETY DISORDER: ICD-10-CM

## 2023-10-27 DIAGNOSIS — F33.0 MILD EPISODE OF RECURRENT MAJOR DEPRESSIVE DISORDER: ICD-10-CM

## 2023-10-27 PROBLEM — S06.9XAA TRAUMATIC BRAIN INJURY: Status: ACTIVE | Noted: 2023-10-27

## 2023-10-27 RX ORDER — DULOXETIN HYDROCHLORIDE 60 MG/1
60 CAPSULE, DELAYED RELEASE ORAL DAILY
Qty: 30 CAPSULE | Refills: 1 | Status: SHIPPED | OUTPATIENT
Start: 2023-10-27

## 2023-10-27 RX ORDER — MULTIVITAMIN
TABLET ORAL
COMMUNITY
Start: 2023-10-26

## 2023-10-27 NOTE — TELEPHONE ENCOUNTER
[facility] chart review and noted patient has seen cardiology and his psychiatric doctor this month. Per cardiology transplant team at [facility] plan as follows:      Assessment/Plan  Chronic systolic heart failure  Nonischemic cardiomyopathy  Cirrhosis  Extensive alcohol use history, now quit for 2 weeks  Anxiety disorder  Depression  Hypothyroidism  Short-term memory loss    The patient is currently NYHA class III, volume overloaded on physical examination  Patient is on excellent heart failure medications including:  Continue Toprol 25 mg p.o. daily  Continue spironolactone 25 mg p.o. daily  Continue Lasix 40 mg p.o. daily  Continue Farxiga 10 mg p.o. daily  Continue Verquvo due to low blood pressure  Continue LifeVest for now  We will get genetic testing for nonischemic cardiomyopathy    Blood pressures low, he is intolerant of further GDMT uptitration  Continue Synthroid  Start meclizine for vertigo  We will refer to neuropsych testing for short-term memory loss  Reports imbalance in the shower, will prescribe shower chair    Miguel Li MD  [facility]  10/26/2023 11:33 AM      Electronically signed by Miguel Li MD at 10/26/2023 12:00 PM EDT      He has a follow up again 11/16/23 and he has appointment for today DOS for YADI Tamayo. Will outreach to patient again next month. He and his aunt have my work cell for needs.

## 2023-10-27 NOTE — PATIENT INSTRUCTIONS
1.  Please return to clinic at your next scheduled visit.  Please contact the clinic ([phone]) at least 24 hours prior in the event you need to cancel.  2.  Do no harm to yourself or others.    3.  Avoid alcohol and drugs.    4.  Take all medications as prescribed.  Please contact the clinic with any concerns. If you are in need of medication refills, please call the clinic at [phone].    5. Should you want to get in touch with your provider, YADI Tamayo, please contact the office ([phone]), and staff will be able to page Kailee directly.  6. In the event you have personal crisis, contact the following crisis numbers: Suicide Prevention Hotline 1-802.466.6060; DANICA Helpline 5-400-792-QFKC; [facility] Emergency Room [phone]; text HELLO to 582302; or 219.     SPECIFIC RECOMMENDATIONS:     1.      Medications discussed at this encounter:     No orders of the defined types were placed in this encounter.                      2.      Psychotherapy recommendations: We will continue therapy at future visits.     3.     Return to clinic: Return in about 1 month (around 11/27/2023) for Next scheduled follow up.

## 2023-10-30 ENCOUNTER — PATIENT OUTREACH (OUTPATIENT)
Dept: CASE MANAGEMENT | Facility: OTHER | Age: 53
End: 2023-10-30
Payer: COMMERCIAL

## 2023-10-30 DIAGNOSIS — K76.6 PORTAL HYPERTENSION: ICD-10-CM

## 2023-10-30 DIAGNOSIS — K70.31 ALCOHOLIC CIRRHOSIS OF LIVER WITH ASCITES: ICD-10-CM

## 2023-10-30 DIAGNOSIS — I50.22 CHRONIC HFREF (HEART FAILURE WITH REDUCED EJECTION FRACTION): Primary | ICD-10-CM

## 2023-10-30 DIAGNOSIS — K70.31 ASCITES DUE TO ALCOHOLIC CIRRHOSIS: ICD-10-CM

## 2023-10-30 NOTE — OUTREACH NOTE
[facility] End of Month Documentation    This Chronic Medical Management Care Plan for Greyson Schofield, 53 y.o. male, has been monitored and managed; reviewed; revised and a new plan of care implemented for the month of October.  A cumulative time of 147  minutes was spent on this patient record this month, including face to face with provider; phone call with patient; phone call with relative; phone call with other providers; chart review; phone call with pharmacist.    Regarding the patient's problems: has Portal hypertension; Ascites due to alcoholic cirrhosis; Nicotine dependence, chewing tobacco, w unsp disorders; Cirrhosis of liver; Chronic HFrEF (heart failure with reduced ejection fraction); Acquired hypothyroidism; and Traumatic brain injury on their problem list., the following items were addressed: medical records; medications and any changes can be found within the plan section of the note.  A detailed listing of time spent for chronic care management is tracked within each outreach encounter.  Current medications include:  has a current medication list which includes the following prescription(s): aspirin, b complex vitamins, farxiga, digoxin, duloxetine, furosemide, hydroxyzine pamoate, levocetirizine, levothyroxine, metoprolol succinate xl, multivitamin, entresto, spironolactone, thiamine, verquvo, and verquvo. and the patient is reported to be patient is compliant with medication protocol,  Medications are reported to be effective.  Regarding these diagnoses, referrals were made to the following provider(s):  Cardiology follow up with specialist.  All notes on chart for PCP to review.    The patient was monitored remotely for activity level.    The patient's physical needs include:  DME supplies.     The patient's mental support needs include:  increased support; coordination of community providers    The patient's cognitive support needs include:  emotional care; coordination of community providers;  continued support    The patient's psychosocial support needs include:  need for increased support; coordination of community providers; medication management or adherence    The patient's functional needs include: DME    The patient's environmental needs include:  not applicable, none    Care Plan overall comments:  reviewed and updated    Refer to previous outreach notes for more information on the areas listed above.    Monthly Billing Diagnoses  (I50.22) Chronic HFrEF (heart failure with reduced ejection fraction)    (K70.31) Alcoholic cirrhosis of liver with ascites    (K70.31) Ascites due to alcoholic cirrhosis    (K76.6) Portal hypertension    Medications   Medications have been reconciled    Care Plan progress this month:      Recently Modified Care Plans Updates made since 9/29/2023 12:00 AM      No recently modified care plans.            Current Specialty Plan of Care Status signed by both patient and provider    Instructions   Patient was provided an electronic copy of care plan  CCM services were explained and offered and patient has accepted these services.  Patient has given their written consent to receive CCM services and understands that this includes the authorization of electronic communication of medical information with the other treating providers.  Patient understands that they may stop CCM services at any time and these changes will be effective at the end of the calendar month and will effectively revocate the agreement of CCM services.  Patient understands that only one practitioner can furnish and be paid for CCM services during one calendar month.  Patient also understands that there may be co-payment or deductible fees in association with CCM services.  Patient will continue with at least monthly follow-up calls with the Ambulatory .    Bushra CHEN  Ambulatory Case Management    10/30/2023, 16:26 EDT

## 2023-11-01 ENCOUNTER — PATIENT OUTREACH (OUTPATIENT)
Dept: CASE MANAGEMENT | Facility: OTHER | Age: 53
End: 2023-11-01
Payer: COMMERCIAL

## 2023-11-01 DIAGNOSIS — K70.31 ALCOHOLIC CIRRHOSIS OF LIVER WITH ASCITES: ICD-10-CM

## 2023-11-01 DIAGNOSIS — K70.31 ASCITES DUE TO ALCOHOLIC CIRRHOSIS: ICD-10-CM

## 2023-11-01 DIAGNOSIS — I50.22 CHRONIC HFREF (HEART FAILURE WITH REDUCED EJECTION FRACTION): Primary | ICD-10-CM

## 2023-11-01 NOTE — OUTREACH NOTE
AMBULATORY CASE MANAGEMENT NOTE    Name and Relationship of Patient/Support Person: Greyson Schofield L - Self    CCM Interim Update    Spoke with patient after reviewing chart. He is doing well, no nee d for ER follow up as is back pain is resolved now. He is watching his activity and doing only what his body can tolerate. Wearing his vest and he does like his rapport with YADI Dugan for Behavioral Health. Denies any needs at this time, care plan updated and will outreach again after his next cardiology follow up.     Adult Patient Profile  Questions/Answers      Flowsheet Row Most Recent Value   Chronic Pain Management Strategies medication therapy   Chronic Pain Self-Management Outcome 4 (good)   Chronic Pain Comment Had an ER visit last month for back strain and pain, has completed all medications for this, Flexeril and Oxycodone and Prednisone and doing well. His imaging was all normal. No PCP follow up needed.   Behavioral Health Symptoms/Conditions anxiety   Behavioral Management Strategies complementary therapy(ies), medication therapy, support system   Behavioral Health Self-Management Outcome 4 (good)   Behavioral Health Comment Continues to see YADI Tamayo at the [place] office for Behavioral Health treatment and doing well with her.                    Bushra CHEN  Ambulatory Case Management    11/1/2023, 11:25 EDT

## 2023-11-13 ENCOUNTER — TRANSCRIBE ORDERS (OUTPATIENT)
Dept: LAB | Facility: HOSPITAL | Age: 53
End: 2023-11-13
Payer: COMMERCIAL

## 2023-11-13 ENCOUNTER — LAB (OUTPATIENT)
Dept: LAB | Facility: HOSPITAL | Age: 53
End: 2023-11-13
Payer: COMMERCIAL

## 2023-11-13 DIAGNOSIS — E03.9 ACQUIRED HYPOTHYROIDISM: ICD-10-CM

## 2023-11-13 DIAGNOSIS — K70.31 ALCOHOLIC CIRRHOSIS OF LIVER WITH ASCITES: ICD-10-CM

## 2023-11-13 DIAGNOSIS — I42.0 CONGESTIVE CARDIOMYOPATHY: Primary | ICD-10-CM

## 2023-11-13 DIAGNOSIS — I42.0 CONGESTIVE CARDIOMYOPATHY: ICD-10-CM

## 2023-11-13 DIAGNOSIS — H81.13 BENIGN PAROXYSMAL VERTIGO OF BOTH EARS: ICD-10-CM

## 2023-11-13 DIAGNOSIS — I50.22 CHRONIC SYSTOLIC HEART FAILURE: ICD-10-CM

## 2023-11-13 PROCEDURE — 83880 ASSAY OF NATRIURETIC PEPTIDE: CPT

## 2023-11-13 PROCEDURE — 84439 ASSAY OF FREE THYROXINE: CPT

## 2023-11-13 PROCEDURE — 84443 ASSAY THYROID STIM HORMONE: CPT

## 2023-11-13 PROCEDURE — 36415 COLL VENOUS BLD VENIPUNCTURE: CPT

## 2023-11-13 PROCEDURE — 80048 BASIC METABOLIC PNL TOTAL CA: CPT

## 2023-11-14 LAB
ANION GAP SERPL CALCULATED.3IONS-SCNC: 11.3 MMOL/L (ref 5–15)
BUN SERPL-MCNC: 16 MG/DL (ref 6–20)
BUN/CREAT SERPL: 15 (ref 7–25)
CALCIUM SPEC-SCNC: 10.2 MG/DL (ref 8.6–10.5)
CHLORIDE SERPL-SCNC: 96 MMOL/L (ref 98–107)
CO2 SERPL-SCNC: 26.7 MMOL/L (ref 22–29)
CREAT SERPL-MCNC: 1.07 MG/DL (ref 0.76–1.27)
EGFRCR SERPLBLD CKD-EPI 2021: 84 ML/MIN/1.73
GLUCOSE SERPL-MCNC: 83 MG/DL (ref 65–99)
NT-PROBNP SERPL-MCNC: 327 PG/ML (ref 0–900)
POTASSIUM SERPL-SCNC: 4.4 MMOL/L (ref 3.5–5.2)
SODIUM SERPL-SCNC: 134 MMOL/L (ref 136–145)
T4 FREE SERPL-MCNC: 0.94 NG/DL (ref 0.93–1.7)
TSH SERPL DL<=0.05 MIU/L-ACNC: 28 UIU/ML (ref 0.27–4.2)

## 2023-11-15 DIAGNOSIS — E03.9 ACQUIRED HYPOTHYROIDISM: Primary | Chronic | ICD-10-CM

## 2023-11-17 ENCOUNTER — TELEPHONE (OUTPATIENT)
Dept: CASE MANAGEMENT | Facility: OTHER | Age: 53
End: 2023-11-17
Payer: COMMERCIAL

## 2023-11-17 ENCOUNTER — PATIENT OUTREACH (OUTPATIENT)
Dept: CASE MANAGEMENT | Facility: OTHER | Age: 53
End: 2023-11-17
Payer: COMMERCIAL

## 2023-11-17 DIAGNOSIS — E03.9 ACQUIRED HYPOTHYROIDISM: Primary | Chronic | ICD-10-CM

## 2023-11-17 RX ORDER — LEVOTHYROXINE SODIUM 0.12 MG/1
125 TABLET ORAL DAILY
Qty: 90 TABLET | Refills: 1 | Status: SHIPPED | OUTPATIENT
Start: 2023-11-17

## 2023-11-17 NOTE — TELEPHONE ENCOUNTER
Per chart review his labs from 11/13/23 showed abnormal Thyroid levels and report to increase medication to 125 mcg daily, please send in to pharmacy and I will call patient.     TY

## 2023-11-17 NOTE — OUTREACH NOTE
AMBULATORY CASE MANAGEMENT NOTE    Name and Relationship of Patient/Support Person: Dr. Doe - Other    Care Coordination    Message to PCP about needs for medication changes, see telephone note.     CCM Interim Update    Chart review from cardiology follow up this week. No changes, continue current treatment.        Education Documentation  No documentation found.        Bushra CHEN  Ambulatory Case Management    11/17/2023, 13:24 EST

## 2023-11-20 NOTE — TELEPHONE ENCOUNTER
Informed his Aunt Ritu and she will let him know to get the new dose and to get labs 6 weeks after starting it.

## 2023-11-20 NOTE — TELEPHONE ENCOUNTER
Provider sent in increased dosage of medication on Friday and routed message back to [facility], I called and left him a message.     Called his aunt Ritu and informed her and she will let him know about medication and repeat labs in 6 weeks after starting medicine.

## 2023-11-21 ENCOUNTER — HOSPITAL ENCOUNTER (EMERGENCY)
Facility: HOSPITAL | Age: 53
Discharge: HOME | End: 2023-11-21
Attending: EMERGENCY MEDICINE | Admitting: EMERGENCY MEDICINE
Payer: COMMERCIAL

## 2023-11-21 ENCOUNTER — APPOINTMENT (OUTPATIENT)
Dept: GENERAL RADIOLOGY | Facility: HOSPITAL | Age: 53
End: 2023-11-21
Payer: COMMERCIAL

## 2023-11-21 VITALS
HEIGHT: 65 IN | WEIGHT: 133.16 LBS | OXYGEN SATURATION: 96 % | RESPIRATION RATE: 16 BRPM | SYSTOLIC BLOOD PRESSURE: 98 MMHG | DIASTOLIC BLOOD PRESSURE: 78 MMHG | TEMPERATURE: 98.1 F | BODY MASS INDEX: 22.19 KG/M2 | HEART RATE: 65 BPM

## 2023-11-21 DIAGNOSIS — I95.1 ORTHOSTATIC HYPOTENSION: Primary | ICD-10-CM

## 2023-11-21 DIAGNOSIS — I42.9 CARDIOMYOPATHY, UNSPECIFIED TYPE: ICD-10-CM

## 2023-11-21 DIAGNOSIS — N28.9 ACUTE RENAL INSUFFICIENCY: ICD-10-CM

## 2023-11-21 DIAGNOSIS — R55 SYNCOPE AND COLLAPSE: ICD-10-CM

## 2023-11-21 LAB
ALBUMIN SERPL-MCNC: 3.9 G/DL (ref 3.5–5.2)
ALBUMIN/GLOB SERPL: 0.9 G/DL
ALP SERPL-CCNC: 144 U/L (ref 39–117)
ALT SERPL W P-5'-P-CCNC: 25 U/L (ref 1–41)
ANION GAP SERPL CALCULATED.3IONS-SCNC: 11.6 MMOL/L (ref 5–15)
AST SERPL-CCNC: 22 U/L (ref 1–40)
BASOPHILS # BLD AUTO: 0.08 10*3/MM3 (ref 0–0.2)
BASOPHILS NFR BLD AUTO: 1 % (ref 0–1.5)
BILIRUB SERPL-MCNC: 0.3 MG/DL (ref 0–1.2)
BILIRUB UR QL STRIP: NEGATIVE
BUN SERPL-MCNC: 21 MG/DL (ref 6–20)
BUN/CREAT SERPL: 15.1 (ref 7–25)
CALCIUM SPEC-SCNC: 9.9 MG/DL (ref 8.6–10.5)
CHLORIDE SERPL-SCNC: 94 MMOL/L (ref 98–107)
CLARITY UR: CLEAR
CO2 SERPL-SCNC: 26.4 MMOL/L (ref 22–29)
COLOR UR: ABNORMAL
CREAT SERPL-MCNC: 1.39 MG/DL (ref 0.76–1.27)
D-LACTATE SERPL-SCNC: 1.1 MMOL/L (ref 0.5–2)
DEPRECATED RDW RBC AUTO: 49.8 FL (ref 37–54)
DIGOXIN SERPL-MCNC: 1.47 NG/ML (ref 0.6–1.2)
EGFRCR SERPLBLD CKD-EPI 2021: 61.4 ML/MIN/1.73
EOSINOPHIL # BLD AUTO: 0.11 10*3/MM3 (ref 0–0.4)
EOSINOPHIL NFR BLD AUTO: 1.4 % (ref 0.3–6.2)
ERYTHROCYTE [DISTWIDTH] IN BLOOD BY AUTOMATED COUNT: 14.5 % (ref 12.3–15.4)
GEN 5 2HR TROPONIN T REFLEX: 49 NG/L
GLOBULIN UR ELPH-MCNC: 4.5 GM/DL
GLUCOSE SERPL-MCNC: 98 MG/DL (ref 65–99)
GLUCOSE UR STRIP-MCNC: ABNORMAL MG/DL
HCT VFR BLD AUTO: 39.6 % (ref 37.5–51)
HGB BLD-MCNC: 12.8 G/DL (ref 13–17.7)
HGB UR QL STRIP.AUTO: NEGATIVE
HOLD SPECIMEN: NORMAL
HOLD SPECIMEN: NORMAL
IMM GRANULOCYTES # BLD AUTO: 0.02 10*3/MM3 (ref 0–0.05)
IMM GRANULOCYTES NFR BLD AUTO: 0.3 % (ref 0–0.5)
KETONES UR QL STRIP: NEGATIVE
LEUKOCYTE ESTERASE UR QL STRIP.AUTO: NEGATIVE
LYMPHOCYTES # BLD AUTO: 3.19 10*3/MM3 (ref 0.7–3.1)
LYMPHOCYTES NFR BLD AUTO: 40.2 % (ref 19.6–45.3)
MAGNESIUM SERPL-MCNC: 1.9 MG/DL (ref 1.6–2.6)
MCH RBC QN AUTO: 30 PG (ref 26.6–33)
MCHC RBC AUTO-ENTMCNC: 32.3 G/DL (ref 31.5–35.7)
MCV RBC AUTO: 93 FL (ref 79–97)
MONOCYTES # BLD AUTO: 0.66 10*3/MM3 (ref 0.1–0.9)
MONOCYTES NFR BLD AUTO: 8.3 % (ref 5–12)
NEUTROPHILS NFR BLD AUTO: 3.87 10*3/MM3 (ref 1.7–7)
NEUTROPHILS NFR BLD AUTO: 48.8 % (ref 42.7–76)
NITRITE UR QL STRIP: NEGATIVE
NRBC BLD AUTO-RTO: 0 /100 WBC (ref 0–0.2)
NT-PROBNP SERPL-MCNC: 220.3 PG/ML (ref 0–900)
PH UR STRIP.AUTO: 5.5 [PH] (ref 5–8)
PLATELET # BLD AUTO: 472 10*3/MM3 (ref 140–450)
PMV BLD AUTO: 8 FL (ref 6–12)
POTASSIUM SERPL-SCNC: 3.9 MMOL/L (ref 3.5–5.2)
PROT SERPL-MCNC: 8.4 G/DL (ref 6–8.5)
PROT UR QL STRIP: NEGATIVE
QT INTERVAL: 376 MS
QTC INTERVAL: 436 MS
RBC # BLD AUTO: 4.26 10*6/MM3 (ref 4.14–5.8)
SODIUM SERPL-SCNC: 132 MMOL/L (ref 136–145)
SP GR UR STRIP: 1.02 (ref 1–1.03)
TROPONIN T DELTA: -7 NG/L
TROPONIN T SERPL HS-MCNC: 56 NG/L
UROBILINOGEN UR QL STRIP: ABNORMAL
WBC NRBC COR # BLD AUTO: 7.93 10*3/MM3 (ref 3.4–10.8)
WHOLE BLOOD HOLD COAG: NORMAL
WHOLE BLOOD HOLD SPECIMEN: NORMAL

## 2023-11-21 PROCEDURE — 25810000003 LACTATED RINGERS SOLUTION: Performed by: EMERGENCY MEDICINE

## 2023-11-21 PROCEDURE — 99284 EMERGENCY DEPT VISIT MOD MDM: CPT

## 2023-11-21 PROCEDURE — 83880 ASSAY OF NATRIURETIC PEPTIDE: CPT | Performed by: EMERGENCY MEDICINE

## 2023-11-21 PROCEDURE — 36415 COLL VENOUS BLD VENIPUNCTURE: CPT

## 2023-11-21 PROCEDURE — 81003 URINALYSIS AUTO W/O SCOPE: CPT | Performed by: EMERGENCY MEDICINE

## 2023-11-21 PROCEDURE — 71045 X-RAY EXAM CHEST 1 VIEW: CPT

## 2023-11-21 PROCEDURE — 80162 ASSAY OF DIGOXIN TOTAL: CPT | Performed by: EMERGENCY MEDICINE

## 2023-11-21 PROCEDURE — 80053 COMPREHEN METABOLIC PANEL: CPT

## 2023-11-21 PROCEDURE — 84484 ASSAY OF TROPONIN QUANT: CPT | Performed by: EMERGENCY MEDICINE

## 2023-11-21 PROCEDURE — 93005 ELECTROCARDIOGRAM TRACING: CPT

## 2023-11-21 PROCEDURE — 93005 ELECTROCARDIOGRAM TRACING: CPT | Performed by: EMERGENCY MEDICINE

## 2023-11-21 PROCEDURE — 83735 ASSAY OF MAGNESIUM: CPT

## 2023-11-21 PROCEDURE — 85025 COMPLETE CBC W/AUTO DIFF WBC: CPT

## 2023-11-21 PROCEDURE — 83605 ASSAY OF LACTIC ACID: CPT | Performed by: EMERGENCY MEDICINE

## 2023-11-21 RX ORDER — SODIUM CHLORIDE 0.9 % (FLUSH) 0.9 %
10 SYRINGE (ML) INJECTION AS NEEDED
Status: DISCONTINUED | OUTPATIENT
Start: 2023-11-21 | End: 2023-11-21 | Stop reason: HOSPADM

## 2023-11-21 RX ADMIN — SODIUM CHLORIDE, POTASSIUM CHLORIDE, SODIUM LACTATE AND CALCIUM CHLORIDE 500 ML: 600; 310; 30; 20 INJECTION, SOLUTION INTRAVENOUS at 15:57

## 2023-11-21 NOTE — DISCHARGE INSTRUCTIONS
Take your time when you change positions.  When you go from sitting to standing wait 60 seconds or so before walking.  Continue to follow Dr. Easton's medical direction.  You may increase your daily fluid intake from 2000 mL to 2100 mL daily.  Follow-up with Dr. Easton as scheduled.  Return to the ER for chest pain, shortness of breath, further episodes of passing out, or any other concerns issues that may arise.

## 2023-11-21 NOTE — ED PROVIDER NOTES
Time: 1:33 PM EST  Date of encounter:  11/21/2023  Independent Historian/Clinical History and Information was obtained by:   Patient and son  Chief Complaint: Syncope    History is limited by: N/A    History of Present Illness:  Patient is a 53 y.o. year old male who presents to the emergency department for evaluation of syncopal episode.  Patient had a syncopal episode last evening around 9:30 PM.  Patient reports that he got up off the couch went into the kitchen.  While he was in the kitchen patient said he had a brief warning.  Preceding syncopal episode.  Patient landed on the floor.  Patient's son was in another room and heard the sound of him falling.  Patient does complain of a headache as well as some right elbow pain.  Patient has had no alteration of his mental status according to the son.  Patient is currently on a LifeVest due to history of CHF with an ejection fraction of 10%.  Patient also has a history of end-stage cirrhosis due to alcohol abuse.  Patient is currently going through the process to qualify for heart and liver transplant.  Patient is followed by Dr. Easton out [facility].    HPI son    Patient Care Team  Primary Care Provider: Bertha Doe DO    Past Medical History:     No Known Allergies  Past Medical History:   Diagnosis Date    Arthritis     CHF (congestive heart failure)     Cirrhosis      Past Surgical History:   Procedure Laterality Date    APPENDECTOMY      CARDIAC CATHETERIZATION N/A 8/25/2023    Procedure: Left Heart Cath with possible coronary angioplasty;  Surgeon: Jomar Lynch MD;  Location: [facility] CATH INVASIVE LOCATION;  Service: Cardiology;  Laterality: N/A;     History reviewed. No pertinent family history.    Home Medications:  Prior to Admission medications    Medication Sig Start Date End Date Taking? Authorizing Provider   aspirin 81 MG EC tablet Take 1 tablet by mouth Daily. 10/5/23   Bertha Doe DO   B Complex Vitamins (B COMPLEX 1 PO)  Take 1 tablet by mouth Daily.    ProviderGertrudis MD   dapagliflozin Propanediol (Farxiga) 10 MG tablet Take 1 tablet by mouth Daily for 30 days. 10/4/23 11/3/23  Bertha Doe DO   digoxin (LANOXIN) 250 MCG tablet Take 1 tablet by mouth Daily. 9/5/23   Lani Galeas MD   DULoxetine (Cymbalta) 60 MG capsule Take 1 capsule by mouth Daily. 10/27/23   Kailee Lin APRN   furosemide (LASIX) 40 MG tablet Take 1 tablet by mouth Daily. 10/5/23   Bertha Doe DO   hydrOXYzine pamoate (Vistaril) 25 MG capsule Take 1 capsule by mouth 3 (Three) Times a Day As Needed for Itching for up to 7 days. 10/4/23 10/27/23  Bertha Doe DO   levocetirizine (XYZAL) 5 MG tablet Take 1 tablet by mouth Every Evening.    Gertrudis Wood MD   levothyroxine (Synthroid) 125 MCG tablet Take 1 tablet by mouth Daily. 11/17/23   Bertha Doe DO   metoprolol succinate XL (TOPROL-XL) 25 MG 24 hr tablet Take 1 tablet by mouth Every Night. 10/5/23   Bertha Doe DO   Multivitamin tablet tablet  10/26/23   Gertrudis Wood MD   sacubitril-valsartan (Entresto) 24-26 MG tablet Take 1 tablet by mouth 2 (Two) Times a Day. 10/5/23   Bertha Doe DO   spironolactone (ALDACTONE) 25 MG tablet Take 1 tablet by mouth Daily. 10/5/23   Bertha Doe DO   thiamine (VITAMIN B-1) 100 MG tablet  tablet Take 1 tablet by mouth Daily. 10/5/23   Bertha Doe DO   Vericiguat (Verquvo) 2.5 MG tablet Take 1 tablet by mouth Daily. 9/5/23   Lani Galeas MD   Vericiguat (Verquvo) 2.5 MG tablet Take 1 tablet by mouth Daily. Indications: [id number] exp 2/21/2025 9/5/23   Lani Galeas MD        Social History:   Social History     Tobacco Use    Smoking status: Every Day     Packs/day: 0.25     Years: 33.00     Additional pack years: 0.00     Total pack years: 8.25     Types: Cigarettes     Start date: 10/1993    Smokeless tobacco: Current     Types: Snuff   Vaping Use    Vaping Use: Some days    Substances: Nicotine    Devices: Disposable  "  Substance Use Topics    Alcohol use: Not Currently     Comment: history of alcohol abuse    Drug use: Not Currently     Types: Marijuana, Cocaine(coke)         Review of Systems:  Review of Systems   Constitutional:  Negative for chills and fever.   HENT:  Negative for congestion, ear pain and sore throat.    Eyes:  Negative for pain.   Respiratory:  Negative for cough, chest tightness and shortness of breath.    Cardiovascular:  Negative for chest pain.   Gastrointestinal:  Negative for abdominal pain, diarrhea, nausea and vomiting.   Genitourinary:  Negative for flank pain and hematuria.   Musculoskeletal:  Positive for arthralgias (Right elbow pain). Negative for joint swelling.   Skin:  Negative for pallor.   Neurological:  Positive for syncope and headaches. Negative for seizures.   All other systems reviewed and are negative.       Physical Exam:  BP 98/78   Pulse 65   Temp 98.1 °F (36.7 °C) (Oral)   Resp 16   Ht 165.1 cm (65\")   Wt 60.4 kg (133 lb 2.5 oz)   SpO2 96%   BMI 22.16 kg/m²     Physical Exam    Vital signs were reviewed under triage note.  General appearance - Patient appears well-developed and well-nourished.  Patient is in no acute distress.  Head - Normocephalic, atraumatic.  Pupils - Equal, round, reactive to light.  Extraocular muscles are intact.  Conjunctiva is clear.  Nasal - Normal inspection.  No evidence of trauma or epistaxis.  Tympanic membranes - Gray, intact without erythema or retractions.  Oral mucosa - Pink and moist without lesions or erythema.  Uvula is midline.  Chest wall - Atraumatic.  Chest wall is nontender.  There are no vesicular rashes noted.  Neck - Supple.  Trachea was midline.  There is no palpable lymphadenopathy or thyromegaly.  There are no meningeal signs  Lungs - Clear to auscultation and percussion bilaterally.  Heart - Regular rate and rhythm without any murmurs, clicks, or gallops.  Abdomen - Soft.  Bowel sounds are present.  There is no palpable " tenderness.  There is no rebound, guarding, or rigidity.  There are no palpable masses.  There are no pulsatile masses.  Back - Spine is straight and midline.  There is no CVA tenderness.  Extremities - Intact x4 with full range of motion.  There is no findings that would warrant radiographic studies.  There is no palpable edema.  Pulses are intact x4 and equal.  Neurologic - Patient is awake, alert, and oriented x3.  Cranial nerves II through XII are grossly intact.  Motor and sensory functions grossly intact.  Cerebellar function was normal.  Integument - There are no rashes.  There are no petechia or purpura lesions noted.  There are no vesicular lesions noted.           Procedures:  Procedures      Medical Decision Making:      Comorbidities that affect care:    Cardiomyopathy, congestive heart failure, cirrhosis    External Notes reviewed:    Previous Clinic Note: Office visit with Rhonda Lin dated 10/27/2023 was reviewed by me.      The following orders were placed and all results were independently analyzed by me:  Orders Placed This Encounter   Procedures    XR Chest 1 View    [place] Draw    Comprehensive Metabolic Panel    Single High Sensitivity Troponin T    Magnesium    Urinalysis With Microscopic If Indicated (No Culture) - Urine, Clean Catch    CBC Auto Differential    High Sensitivity Troponin T 2Hr    Digoxin Level    BNP    Lactic Acid, Plasma    NPO Diet NPO Type: Strict NPO    Undress & Gown    Continuous Pulse Oximetry    Vital Signs    Orthostatic Blood Pressure    IP General Consult (Use specialty-specific consult if known)    Oxygen Therapy- Nasal Cannula; Titrate 1-6 LPM Per SpO2; 90 - 95%    POC Glucose Once    ECG 12 Lead ED Triage Standing Order; Weak / Dizzy / AMS    Insert Peripheral IV    Fall Precautions    CBC & Differential    Green Top (Gel)    Lavender Top    Gold Top - SST    Light Blue Top       Medications Given in the Emergency Department:  Medications   sodium chloride  0.9 % flush 10 mL (has no administration in time range)   lactated ringers bolus 500 mL (has no administration in time range)        ED Course:    ED Course as of 11/21/23 1543   Tue Nov 21, 2023   1447 EKG performed at 1209 was interpreted by me to show normal sinus rhythm with ventricular rate of 81 bpm.  The IL interval was 186 ms.  P waves are normal.  QRS interval was 113 ms.  Patient has poor R wave progression with a borderline intraventricular conduction delay.  Axis is leftward -40 degrees.  There are some nonspecific ST-T wave change identified.  Additionally there is some mild ST depression/T wave inversion in leads V4 through V6 which is new from EKG performed on 9/10/2023.  QT corrected was 436 ms. [TB]      ED Course User Index  [TB] Jass Patterson DO     The patient was seen and evaluated the ED by me.  The above history and physical examination was performed as documented.  Diagnostic data was obtained.  Results reviewed.  Discussed with the patient and son.  The patient was noted to be orthostatic hypotensive and this is most likely the cause of his syncopal episode last night.  Because of his poor cardiac function and an EF of approximately 10% I spoke with Dr. Easton.  He advises no other medicine changes at this time.  He did state that we can give up to 500 mL of fluids if he tolerates to help with his symptomatology.  He wants the patient to follow-up in the office as scheduled.  I did discuss this with the patient.  Patient states that he is on a 2000 mL restriction daily.  I told him he could go up to 2100 mL daily until he sees Dr. Easton.    Regarding the patient's elevated troponin, it is important to note that high-sensitivity troponin tests have higher sensitivity but lower specificity compared to conventional troponin tests. This means that while high-sensitivity troponin tests can detect smaller levels of troponin in the blood, they may also produce more false positive results.  Therefore, the results of a high-sensitivity troponin test should be interpreted in conjunction with the patient's clinical history and other diagnostic tests.    In this case, the patient's repeat troponin has decreased, which may suggest that the initial elevation was a false positive or a result of a transient cardiac event. However, further evaluation by a cardiologist may be warranted to determine the underlying cause and rule out any potential cardiac conditions.    Labs:    Lab Results (last 24 hours)       Procedure Component Value Units Date/Time    CBC & Differential [[id number]]  (Abnormal) Collected: 11/21/23 1213    Specimen: Blood from Arm, Right Updated: 11/21/23 1227    Narrative:      The following orders were created for panel order CBC & Differential.  Procedure                               Abnormality         Status                     ---------                               -----------         ------                     CBC Auto Differential[[id number]]        Abnormal            Final result                 Please view results for these tests on the individual orders.    Comprehensive Metabolic Panel [[id number]]  (Abnormal) Collected: 11/21/23 1213    Specimen: Blood from Arm, Right Updated: 11/21/23 1247     Glucose 98 mg/dL      BUN 21 mg/dL      Creatinine 1.39 mg/dL      Sodium 132 mmol/L      Potassium 3.9 mmol/L      Chloride 94 mmol/L      CO2 26.4 mmol/L      Calcium 9.9 mg/dL      Total Protein 8.4 g/dL      Albumin 3.9 g/dL      ALT (SGPT) 25 U/L      AST (SGOT) 22 U/L      Alkaline Phosphatase 144 U/L      Total Bilirubin 0.3 mg/dL      Globulin 4.5 gm/dL      A/G Ratio 0.9 g/dL      BUN/Creatinine Ratio 15.1     Anion Gap 11.6 mmol/L      eGFR 61.4 mL/min/1.73     Narrative:      GFR Normal >60  Chronic Kidney Disease <60  Kidney Failure <15      Single High Sensitivity Troponin T [[id number]]  (Abnormal) Collected: 11/21/23 1213    Specimen: Blood from Arm, Right Updated: 11/21/23  1331     HS Troponin T 56 ng/L     Narrative:      High Sensitive Troponin T Reference Range:  <14.0 ng/L- Negative Female for AMI  <22.0 ng/L- Negative Male for AMI  >=14 - Abnormal Female indicating possible myocardial injury.  >=22 - Abnormal Male indicating possible myocardial injury.   Clinicians would have to utilize clinical acumen, EKG, Troponin, and serial changes to determine if it is an Acute Myocardial Infarction or myocardial injury due to an underlying chronic condition.         Magnesium [[id number]]  (Normal) Collected: 11/21/23 1213    Specimen: Blood from Arm, Right Updated: 11/21/23 1247     Magnesium 1.9 mg/dL     CBC Auto Differential [[id number]]  (Abnormal) Collected: 11/21/23 1213    Specimen: Blood from Arm, Right Updated: 11/21/23 1227     WBC 7.93 10*3/mm3      RBC 4.26 10*6/mm3      Hemoglobin 12.8 g/dL      Hematocrit 39.6 %      MCV 93.0 fL      MCH 30.0 pg      MCHC 32.3 g/dL      RDW 14.5 %      RDW-SD 49.8 fl      MPV 8.0 fL      Platelets 472 10*3/mm3      Neutrophil % 48.8 %      Lymphocyte % 40.2 %      Monocyte % 8.3 %      Eosinophil % 1.4 %      Basophil % 1.0 %      Immature Grans % 0.3 %      Neutrophils, Absolute 3.87 10*3/mm3      Lymphocytes, Absolute 3.19 10*3/mm3      Monocytes, Absolute 0.66 10*3/mm3      Eosinophils, Absolute 0.11 10*3/mm3      Basophils, Absolute 0.08 10*3/mm3      Immature Grans, Absolute 0.02 10*3/mm3      nRBC 0.0 /100 WBC     High Sensitivity Troponin T 2Hr [[id number]]  (Abnormal) Collected: 11/21/23 1441    Specimen: Blood Updated: 11/21/23 1504     HS Troponin T 49 ng/L      Troponin T Delta -7 ng/L     Narrative:      High Sensitive Troponin T Reference Range:  <14.0 ng/L- Negative Female for AMI  <22.0 ng/L- Negative Male for AMI  >=14 - Abnormal Female indicating possible myocardial injury.  >=22 - Abnormal Male indicating possible myocardial injury.   Clinicians would have to utilize clinical acumen, EKG, Troponin, and serial changes to  determine if it is an Acute Myocardial Infarction or myocardial injury due to an underlying chronic condition.         Digoxin Level [[id number]]  (Abnormal) Collected: 11/21/23 1441    Specimen: Blood Updated: 11/21/23 1533     Digoxin 1.47 ng/mL     BNP [[id number]] Collected: 11/21/23 1441    Specimen: Blood Updated: 11/21/23 1531    Urinalysis With Microscopic If Indicated (No Culture) - Urine, Clean Catch [[id number]]  (Abnormal) Collected: 11/21/23 1512    Specimen: Urine, Clean Catch Updated: 11/21/23 1524     Color, UA Dark Yellow     Appearance, UA Clear     pH, UA 5.5     Specific Gravity, UA 1.024     Glucose, UA >=1000 mg/dL (3+)     Ketones, UA Negative     Bilirubin, UA Negative     Blood, UA Negative     Protein, UA Negative     Leuk Esterase, UA Negative     Nitrite, UA Negative     Urobilinogen, UA 1.0 E.U./dL    Narrative:      Urine microscopic not indicated.             Imaging:    XR Chest 1 View    Result Date: 11/21/2023  PROCEDURE: XR CHEST 1 VW  COMPARISON: [facility], , XR CHEST 1 VW, 9/10/2023, 0:53.  INDICATIONS: Syncope episode x 1 day ago  FINDINGS:  The heart size is normal.  There is chronic appearing scarring at the left lung base.  The lungs are otherwise clear.  The pulmonary vascular pattern is normal.       Chronic scarring in the left lung base otherwise negative chest       Alan Hough MD       Electronically Signed and Approved By: Alan Hough MD on 11/21/2023 at 12:41                Differential Diagnosis and Discussion:    Syncope: Differential diagnosis includes but is not limited to TIA, hyperventilation, aortic stenosis, pulmonary emboli, myocardial disease, bradycardia arrhythmia, heart block, tachyarrhythmia, vasovagal, orthostatic hypotension, ruptured AAA, aortic dissection, subarachnoid hemorrhage, seizure, hypoglycemia.    All labs were reviewed and interpreted by me.  All X-rays impressions were independently interpreted by me.  EKG was  interpreted by me.    MDM     Amount and/or Complexity of Data Reviewed  Clinical lab tests: reviewed  Tests in the radiology section of CPT®: reviewed  Tests in the medicine section of CPT®: reviewed  Decide to obtain previous medical records or to obtain history from someone other than the patient: yes             Patient Care Considerations:    CT HEAD: I considered ordering a noncontrast CT of the head, however the patient declined at the CT of the head.  Patient understands the risk/benefits of not having a head CT performed and missed diagnosis of intracerebral hemorrhage.      Consultants/Shared Management Plan:    I have discussed the case with Dr. Easton who states that the patient can be safely discharged with close follow up.    Social Determinants of Health:    Patient is independent, reliable, and has access to care.       Disposition and Care Coordination:    Discharged: I considered escalation of care by admitting this patient for observation, however the patient has improved and is suitable and  stable for discharge.    I have explained the patient´s condition, diagnoses and treatment plan based on the information available to me at this time. I have answered questions and addressed any concerns. The patient has a good  understanding of the patient´s diagnosis, condition, and treatment plan as can be expected at this point. The vital signs have been stable. The patient´s condition is stable and appropriate for discharge from the emergency department.      The patient will pursue further outpatient evaluation with the primary care physician or other designated or consulting physician as outlined in the discharge instructions. They are agreeable to this plan of care and follow-up instructions have been explained in detail. The patient has received these instructions in written format and have expressed an understanding of the discharge instructions. The patient is aware that any significant change in  condition or worsening of symptoms should prompt an immediate return to this or the closest emergency department or call to 911.    Final diagnoses:   Orthostatic hypotension   Syncope and collapse   Cardiomyopathy, unspecified type   Acute renal insufficiency        ED Disposition       ED Disposition   Discharge    Condition   Stable    Comment   --               This medical record created using voice recognition software.             Jass Patterson DO  11/23/23 5214

## 2023-11-29 ENCOUNTER — PATIENT OUTREACH (OUTPATIENT)
Dept: CASE MANAGEMENT | Facility: OTHER | Age: 53
End: 2023-11-29
Payer: COMMERCIAL

## 2023-11-29 DIAGNOSIS — K76.6 PORTAL HYPERTENSION: ICD-10-CM

## 2023-11-29 DIAGNOSIS — E03.9 ACQUIRED HYPOTHYROIDISM: Primary | ICD-10-CM

## 2023-11-29 DIAGNOSIS — I50.22 CHRONIC HFREF (HEART FAILURE WITH REDUCED EJECTION FRACTION): ICD-10-CM

## 2023-11-29 NOTE — OUTREACH NOTE
[facility] End of Month Documentation    This Chronic Medical Management Care Plan for Greyson Schofield, 53 y.o. male, has been monitored and managed; reviewed; revised and a new plan of care implemented for the month of November.  A cumulative time of 29  minutes was spent on this patient record this month, including phone call with patient; chart review; face to face with provider; electronic communication with primary care provider.    Regarding the patient's problems: has Portal hypertension; Ascites due to alcoholic cirrhosis; Nicotine dependence, chewing tobacco, w unsp disorders; Cirrhosis of liver; Chronic HFrEF (heart failure with reduced ejection fraction); Acquired hypothyroidism; and Traumatic brain injury on their problem list., the following items were addressed: medical records; medications; changes to medical care and any changes can be found within the plan section of the note.  A detailed listing of time spent for chronic care management is tracked within each outreach encounter.  Current medications include:  has a current medication list which includes the following prescription(s): aspirin, b complex vitamins, farxiga, digoxin, duloxetine, furosemide, hydroxyzine pamoate, levocetirizine, levothyroxine, metoprolol succinate xl, multivitamin, entresto, spironolactone, thiamine, verquvo, and verquvo. and the patient is reported to be patient is compliant with medication protocol,  Medications are reported to be non-effective in controlling symptoms and changes have been made to the medication protocol.  Regarding these diagnoses, referrals were made to the following provider(s):  Cardiology and had ER visit this month.  All notes on chart for PCP to review.    The patient was monitored remotely for activity level; mood & behavior; pain; medications.    The patient's physical needs include:  needs met.     The patient's mental support needs include:  continued support    The patient's cognitive support needs  include:  needs met    The patient's psychosocial support needs include:  medication management or adherence; continued support    The patient's functional needs include: needs met    The patient's environmental needs include:  not applicable, none    Care Plan overall comments:  reviewed and updated    Refer to previous outreach notes for more information on the areas listed above.    Monthly Billing Diagnoses  (E03.9) Acquired hypothyroidism    (I50.22) Chronic HFrEF (heart failure with reduced ejection fraction)    (K76.6) Portal hypertension    Medications   Medications have been reconciled    Care Plan progress this month:      Recently Modified Care Plans Updates made since 10/29/2023 12:00 AM       Anxiety (Adult)           Problem Priority Last Modified     Anxiety Identification (Anxiety) --  9/25/2023  2:43 PM by Bushra Coy RN              Goal Recent Progress Last Modified     Anxiety Symptoms Identified --  11/1/2023 11:18 AM by Bushra Coy RN     Evidence-based guidance:   Assess for presence of additional co-occurring psychiatric comorbidity [e.g., substance use, other anxiety disorder (specific phobia, social anxiety disorder, panic disorder, agoraphobia, substance or medically-induced    anxiety disorder)].   Assess for presence of medical comorbidity (e.g., chronic pain, chronic illness), recent or recurrent trauma or abuse, family history of substance use disorder or mental illness.   Screen for anxiety using standardized, validated tool.   Move gradually from investigating somatic complaints to exploring social or psychologic distress.   Assess for signs and symptoms of anxiety in an atmosphere of hope and optimism.   Facilitate full diagnostic interview when positive screening results are noted; utilize DSM-5 criteria to determine appropriate diagnosis.   Screen for depression simultaneously due to the frequency of co-occurrence.    Notes:              Task Due Date Last Modified      Identify Anxiety Symptoms and Facilitate Treatment --  11/1/2023 11:18 AM by Bushra Coy RN     Care Management Activities:      - depression screen reviewed  - mental health treatment arranged      Notes: 11/1/23 continues Behavioral Health treatment and medication with YADI Tamayo and doing well               Problem Priority Last Modified     Symptoms (Anxiety) --  9/25/2023  2:43 PM by Bushra Coy RN              Goal Recent Progress Last Modified     Anxiety Symptoms Monitored and Managed --  11/1/2023 11:19 AM by Bushra Coy RN     Evidence-based guidance:   Discuss adherence to medication therapy; assess and manage barriers, such as costs, side effects, feeling little or no improvement, stigma or low motivation.   Discuss past experiences with psychotropic medication, potential side effects and need to continue medication until treatment becomes effective (e.g., 4 to 8 weeks).   Explain the interplay of stress and physical symptoms, as well as the relationship between illness and emotional problems.   Explore complementary and alternative therapy options, such as applied relaxation, mindfulness, meditation, music therapy, aromatherapy, acupuncture or massage.   Prepare patient for antidepressant pharmacologic therapy which may include additional short-term medications until maintenance medications demonstrate therapeutic effect.    Assess response; monitor and manage side effects.   Prepare patient for use of pharmacologic therapy (e.g., anxiolytic, selective serotonin-reuptake inhibitor, serotonin norepinephrine-reuptake inhibitor, tricyclic antidepressant, antiepileptic, antipsychotic for comorbid depression,    phosphodiesterase-inhibitor for sexual dysfunction, sedative or hypnotic for sleep disturbance); monitor and manage side effects.   Prepare patient for long-term pharmacologic therapy to prevent relapse (e.g., 12 months after symptom improvement).   Promote cognitive behavioral  therapy, individualized to severity of symptoms [e.g., nonfacilitated self-help, facilitated (computerized or manual-based) self-help, face-to-face individual treatment].   Encourage patient to develop a self-management plan that identifies and manages lifestyle triggers (e.g., caffeine, stimulants, nicotine, stress), improves sleep, exercise or physical activity based on tolerance.   Prepare patient for a referral to a psychiatrist when patient has a poor response to treatment, atypical presentation or comorbid psychiatric disorder.   Provide frequent follow-up (e.g., every 2 weeks for the first 6 to 8 weeks of treatment and monthly thereafter).   Explore means to support work reintegration, such as modified working hours, peer support or coaching or a community [employer] program.    Notes:              Task Due Date Last Modified     Alleviate Barriers to Anxiety Treatment Success --  11/1/2023 11:18 AM by Bushra Coy RN     Care Management Activities:      - complementary therapy use encouraged  - coping strategies encouraged  - medication adherence assessment completed  - participation in counseling encouraged      Notes:                Problem Priority Last Modified     Harm or Injury (Anxiety) --  9/25/2023  2:43 PM by Bushra Coy RN              Goal Recent Progress Last Modified     Harm or Injury Prevented --  11/1/2023 11:19 AM by Bushra Coy RN     Evidence-based guidance:   When using selective serotonin-reuptake inhibitor, anticipate bone density scanning and supplementation with calcium and vitamin D based on presentation and risk factors.   Engage family in providing a safe home environment and in closely monitoring changes in the patient's emotional state, such as negativity, hopelessness and suicidal ideation.   Explore risk of suicide, self-injurious behavior, self-neglect, severe functional impairment and potential harm to others.   Explore use of herbal and over-the-counter medications,  such as River's Wort, Kava or Valerian; address drug interaction concerns.   Maintain frequent, structured and supportive contact, such as by phone, office or home visit; collaborate closely with behavioral health specialists and psychiatry.   Make immediate arrangements for evaluation at a local emergency department, community mental health agency or other psychiatric service when patient expresses positive suicidal ideation with a plan and access to lethal means.   Monitor and manage side effects of pharmacologic therapy, including osteoporotic fractures, fall risk and gastrointestinal disturbances; consider exaggerated side effects in the elderly due to longer elimination half-life.   Provide anticipatory guidance to remove lethal medication and firearms from home; ensure adequate supervision is provided to monitor for increasing risk factors; provide emergency contact information and instructions.    Notes:              Task Due Date Last Modified     Identify and Reduce Risks for Harm or Injury --  11/1/2023 11:20 AM by Bushra Coy RN     Care Management Activities:      - side effects of medication monitored      Notes:                     Heart Failure (Adult)           Problem Priority Last Modified     Symptom Exacerbation (Heart Failure) --  9/25/2023  2:43 PM by Bushra Coy RN              Goal Recent Progress Last Modified     Symptom Exacerbation Prevented or Minimized --  11/1/2023 11:20 AM by Bushra Coy RN     Evidence-based guidance:   Perform or review cognitive and/or health literacy screening.   Assess understanding of adherence and barriers to treatment plan, as well as lifestyle changes; develop strategies to address barriers.   Establish a [id number] early intervention process to communicate with primary care provider when signs/symptoms worsen.   Facilitate timely posthospital discharge or emergency department treatment that includes intensive follow-up via telephone  calls, home visit, telehealth monitoring and care at multidisciplinary heart failure clinic.   Adjust frequency and intensity of follow-up based on presentation, number of emergency department visits, hospital admissions and frequency and severity of symptom exacerbation.   Facilitate timely visit, usually within 1 week, with primary care provider following hospital discharge.   Collaborate with clinical pharmacist to address adverse drug reactions, drug interactions, subtherapeutic dosage, patient and family education.   Regularly screen for presence of depressive symptoms using a validated tool; consider pharmacologic therapy and/or referral for cognitive behavioral therapy when present.   Refer to community-based services, such as a heart failure support group, community health worker or peer support program.   Review immunization status; arrange receipt of needed vaccinations.   Prepare patient for home oxygen use based on signs/symptoms.    Notes:              Task Due Date Last Modified     Identify and Minimize Risk of Heart Failure Exacerbation --  11/1/2023 11:20 AM by Bushra Coy RN     Care Management Activities:      - medication-adherence assessment completed  - self-awareness of signs/symptoms of worsening disease encouraged      Notes:                Problem Priority Last Modified     Disease Progression (Heart Failure) --  9/25/2023  2:43 PM by Bushra Coy RN              Goal Recent Progress Last Modified     Comorbidities Identified and Managed --  11/1/2023 11:20 AM by Bushra Coy RN     Evidence-based guidance:   Assess and address signs/symptoms of comorbidity, including dyslipidemia, diabetes, iron deficiency, gout, arthritis, dysrhythmia, hypertension, cachexia, coronary artery disease, kidney dysfunction and lung disease.   Prepare patient for laboratory and diagnostic exams based on risk and presentation.   Prepare for use of pharmacologic therapy that may include statin,  angiotensin converting enzyme (ACE) inhibitor, angiotensin receptor blocker (ARB), beta-blocker, digoxin, antidysrhythmic, diuretic or omega-3 fatty acid.   Monitor side effects and anticipate need for periodic adjustments.   Prepare patient for potential invasive treatment, such as implantable cardioverter-defibrillator, cardiac resynchronization therapy or heart transplant as disease progresses.    Notes:              Task Due Date Last Modified     Identify and Manage Comorbidities --  11/1/2023 11:21 AM by Bushra Coy RN     Care Management Activities:      - medication side effects monitored and managed      Notes:              Goal Recent Progress Last Modified     Health Optimized --  11/1/2023 11:21 AM by Bushra Coy RN     Evidence-based guidance:   Use brief intervention, such as 5 A's (Ask, Advise, Assess, Assist, Arrange) to encourage smoking cessation; refer to smoking cessation program, if ready for more intensive intervention.   Perform or refer to a registered dietitian for a nutrition assessment and nutrition-focused physical exam.    Identify potential micronutrient deficiencies, such as iron, vitamin D and thiamin.   Assess need for potential diet and fluid modification, such as reduced sodium or fluid intake.   Minimize unnecessary dietary restrictions to increase oral intake. Note: Sodium restriction should be individualized to the patient and clinical status.   Facilitate home monitoring of weight.    Notes:              Task Due Date Last Modified     Optimize Health --  11/1/2023 11:21 AM by Bushra Coy RN     Care Management Activities:      - home monitoring of weight gain or loss encouraged      Notes:                Problem Priority Last Modified     Activity Tolerance (Heart Failure) --  9/25/2023  2:43 PM by Bushra Coy RN              Goal Recent Progress Last Modified     Activity Tolerance Optimized --  11/1/2023 11:21 AM by Bushra Coy RN     Evidence-based  guidance:   Promote daily physical activity that improves functional ability, cognition and quality of life.   Encourage reduction in sedentary time.    Encourage optimal, safe functional mobility and self-care performance based on ability and tolerance.    Promote breathing and energy conservation techniques, such as pursed-lip breathing, preplanning and pacing of activity, balancing activity and rest.   Encourage participation in cardiac rehabilitation services.    Notes:              Task Due Date Last Modified     Maintain Strength and Functional Ability --  11/1/2023 11:22 AM by Bushra Coy RN     Care Management Activities:      - activity or exercise based on tolerance encouraged  - barriers to activities addressed      Notes: 11/1/23 doing well with current treatment and seeing cardiology on a regular visit basis, the transplant is on hold for now               Problem Priority Last Modified     Obstructive Sleep Apnea (Heart Failure) --  9/25/2023  2:43 PM by Bushra Coy RN              Goal Recent Progress Last Modified     Effective Breathing Pattern During Sleep --  11/1/2023 11:23 AM by Bushra Coy RN     Evidence-based guidance:   Use a validated screening tool to identify risk for obstructive sleep apnea (GUTIERREZ).   Encourage a nonsupine sleep position, such as side-lying, head of bed elevated, multiple pillows, to prevent airway collapse.   Encourage the use of sleep hygiene techniques.   Anticipate a referral for sleep study (polysomnography).   If GUTIERREZ is identified, prepare patient for treatment options, such as nighttime oxygen therapy and CPAP (continuous positive airway pressure) or BiPAP (bilevel positive airway pressure).    Notes:              Task Due Date Last Modified     Monitor and Manage Obstructive Sleep Apnea (GUTIERREZ) --  11/1/2023 11:23 AM by Bushra Coy RN     Care Management Activities:      - not discussed during this outreach      Notes: N/A for him                          Current Specialty Plan of Care Status signed by both patient and provider    Instructions   Patient was provided an electronic copy of care plan  CCM services were explained and offered and patient has accepted these services.  Patient has given their written consent to receive CCM services and understands that this includes the authorization of electronic communication of medical information with the other treating providers.  Patient understands that they may stop CCM services at any time and these changes will be effective at the end of the calendar month and will effectively revocate the agreement of CCM services.  Patient understands that only one practitioner can furnish and be paid for CCM services during one calendar month.  Patient also understands that there may be co-payment or deductible fees in association with CCM services.  Patient will continue with at least monthly follow-up calls with the Ambulatory .    Bushra CHEN  Ambulatory Case Management    11/29/2023, 16:02 EST
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
## 2023-12-01 ENCOUNTER — TRANSCRIBE ORDERS (OUTPATIENT)
Dept: ADMINISTRATIVE | Facility: HOSPITAL | Age: 53
End: 2023-12-01
Payer: COMMERCIAL

## 2023-12-01 ENCOUNTER — OFFICE VISIT (OUTPATIENT)
Dept: BEHAVIORAL HEALTH | Facility: CLINIC | Age: 53
End: 2023-12-01
Payer: COMMERCIAL

## 2023-12-01 VITALS
HEART RATE: 83 BPM | WEIGHT: 134 LBS | BODY MASS INDEX: 22.33 KG/M2 | SYSTOLIC BLOOD PRESSURE: 93 MMHG | HEIGHT: 65 IN | DIASTOLIC BLOOD PRESSURE: 64 MMHG

## 2023-12-01 DIAGNOSIS — F33.0 MILD EPISODE OF RECURRENT MAJOR DEPRESSIVE DISORDER: ICD-10-CM

## 2023-12-01 DIAGNOSIS — M89.9 LYTIC LESION OF BONE ON X-RAY: Primary | ICD-10-CM

## 2023-12-01 DIAGNOSIS — F51.01 PRIMARY INSOMNIA: ICD-10-CM

## 2023-12-01 DIAGNOSIS — F41.1 GENERALIZED ANXIETY DISORDER: ICD-10-CM

## 2023-12-01 DIAGNOSIS — F10.21 ALCOHOL USE DISORDER, SEVERE, IN EARLY REMISSION: Primary | ICD-10-CM

## 2023-12-01 DIAGNOSIS — R92.8 ABNORMAL MAMMOGRAM: ICD-10-CM

## 2023-12-01 RX ORDER — MECLIZINE HYDROCHLORIDE 25 MG/1
TABLET ORAL
COMMUNITY
Start: 2023-10-26

## 2023-12-01 RX ORDER — DULOXETIN HYDROCHLORIDE 60 MG/1
60 CAPSULE, DELAYED RELEASE ORAL DAILY
Qty: 30 CAPSULE | Refills: 1 | Status: SHIPPED | OUTPATIENT
Start: 2023-12-01 | End: 2023-12-13 | Stop reason: SDUPTHER

## 2023-12-01 RX ORDER — AMOXICILLIN 500 MG/1
500 CAPSULE ORAL 3 TIMES DAILY
COMMUNITY

## 2023-12-01 RX ORDER — MIRTAZAPINE 7.5 MG/1
7.5 TABLET, FILM COATED ORAL NIGHTLY
Qty: 30 TABLET | Refills: 1 | Status: SHIPPED | OUTPATIENT
Start: 2023-12-01 | End: 2023-12-15 | Stop reason: SDUPTHER

## 2023-12-01 RX ORDER — HYDROCODONE BITARTRATE AND ACETAMINOPHEN 7.5; 325 MG/1; MG/1
1 TABLET ORAL SEE ADMIN INSTRUCTIONS
COMMUNITY
Start: 2023-11-02

## 2023-12-01 NOTE — PATIENT INSTRUCTIONS
1.  Please return to clinic at your next scheduled visit.  Please contact the clinic ([phone]) at least 24 hours prior in the event you need to cancel.  2.  Do no harm to yourself or others.    3.  Avoid alcohol and drugs.    4.  Take all medications as prescribed.  Please contact the clinic with any concerns. If you are in need of medication refills, please call the clinic at [phone].    5. Should you want to get in touch with your provider, YADI Tamayo, please contact the office ([phone]), and staff will be able to page Kailee directly.  6. In the event you have personal crisis, contact the following crisis numbers: Suicide Prevention Hotline 1-249.637.4334; DANICA Helpline 1-891-272-FFOF; [facility] Emergency Room [phone]; text HELLO to 104812; or 340.     SPECIFIC RECOMMENDATIONS:     1.      Medications discussed at this encounter:     New Medications Ordered This Visit   Medications    mirtazapine (REMERON) 7.5 MG tablet     Sig: Take 1 tablet by mouth Every Night.     Dispense:  30 tablet     Refill:  1    DULoxetine (Cymbalta) 60 MG capsule     Sig: Take 1 capsule by mouth Daily.     Dispense:  30 capsule     Refill:  1                       2.      Psychotherapy recommendations: We will continue therapy at future visits.     3.     Return to clinic: Return in about 1 month (around 1/1/2024).

## 2023-12-05 ENCOUNTER — TELEPHONE (OUTPATIENT)
Dept: CASE MANAGEMENT | Facility: OTHER | Age: 53
End: 2023-12-05
Payer: COMMERCIAL

## 2023-12-05 LAB
QT INTERVAL: 376 MS
QTC INTERVAL: 436 MS

## 2023-12-05 NOTE — TELEPHONE ENCOUNTER
Chart reviewed in prep to [facility] for CCM call and noted he traveled to  today to have teeth extractions. Will reach out tomorrow once [facility] is home and [facility] [place] and [place] and oral surgery.

## 2023-12-12 ENCOUNTER — TELEPHONE (OUTPATIENT)
Dept: PSYCHIATRY | Facility: CLINIC | Age: 53
End: 2023-12-12
Payer: COMMERCIAL

## 2023-12-12 DIAGNOSIS — F33.0 MILD EPISODE OF RECURRENT MAJOR DEPRESSIVE DISORDER: ICD-10-CM

## 2023-12-12 DIAGNOSIS — F41.1 GENERALIZED ANXIETY DISORDER: ICD-10-CM

## 2023-12-12 NOTE — TELEPHONE ENCOUNTER
JESÚS FROM [facility] CALLED. PT IS REQUESTING A REFILL ON HIS CYMBALTA 60 MG CAPSULES.      PHARMACY INFORMED PT THAT IT WAS STILL TO EARLY.  PT TOLD THE PHARMACY THAT HE TOOK HIS LAST PILL THIS MORNING.     PT TOLD THE PHARMACY THAT PROVIDER AUTHORIZED HIM TO BE TAKING 2 PILLS A DAY OF THE CYMBALTA.    HOWEVER ACCORDING TO PTS CHART FROM HIS LAST VISIT HE IS STILL ONLY TO BE TAKING 1 PILL DAILY.   Office Visit with Kailee Lin APRN (12/01/2023)     THE MOST CURRENT PRESCRIPTION ALSO INDICATES THESE INSTRUCTION.   DULoxetine (Cymbalta) 60 MG capsule (12/01/2023)     PROVIDER PLEASE REVIEW AND ADVISE. THANK YOU.

## 2023-12-13 ENCOUNTER — HOSPITAL ENCOUNTER (OUTPATIENT)
Dept: NUCLEAR MEDICINE | Facility: HOSPITAL | Age: 53
Discharge: HOME | End: 2023-12-13
Payer: COMMERCIAL

## 2023-12-13 DIAGNOSIS — M89.9 LYTIC LESION OF BONE ON X-RAY: ICD-10-CM

## 2023-12-13 DIAGNOSIS — R92.8 ABNORMAL MAMMOGRAM: ICD-10-CM

## 2023-12-13 PROCEDURE — A9503 TC99M MEDRONATE: HCPCS | Performed by: INTERNAL MEDICINE

## 2023-12-13 PROCEDURE — 0 TECHNETIUM MEDRONATE KIT: Performed by: INTERNAL MEDICINE

## 2023-12-13 PROCEDURE — 78306 BONE IMAGING WHOLE BODY: CPT

## 2023-12-13 RX ORDER — TC 99M MEDRONATE 20 MG/10ML
23.5 INJECTION, POWDER, LYOPHILIZED, FOR SOLUTION INTRAVENOUS
Status: COMPLETED | OUTPATIENT
Start: 2023-12-13 | End: 2023-12-13

## 2023-12-13 RX ORDER — DULOXETIN HYDROCHLORIDE 60 MG/1
60 CAPSULE, DELAYED RELEASE ORAL 2 TIMES DAILY
Qty: 60 CAPSULE | Refills: 1 | Status: SHIPPED | OUTPATIENT
Start: 2023-12-13

## 2023-12-13 RX ADMIN — TC 99M MEDRONATE 23.5 MILLICURIE: 20 INJECTION, POWDER, LYOPHILIZED, FOR SOLUTION INTRAVENOUS at 07:53

## 2023-12-13 NOTE — TELEPHONE ENCOUNTER
Phoned patient. He stated that after we increased Cymbalta to 60 mg capsules he was taking it twice daily on several days and it was helping with depression and anxiety symptoms. He is tolerating this well. We will increase dose to Cymbalta 60mg twice daily. Stated he is looking forward to follow-up appointment.     CYMBALTA (DULOXETINE) Risks, benefits, alternatives discussed with patient including GI upset, nausea vomiting diarrhea, theoretical decrease of seizure threshold predisposing the patient to a slightly higher seizure risk, headaches, sexual dysfunction, serotonin syndrome, bleeding risk, increased suicidality in patients 24 years and younger.  Also constipation and urinary retention.  After discussion of these risks and benefits, the patient voiced understanding and agreed to proceed.    Follow-up Appointment with Kailee Lin APRN (01/05/2024)     YADI Tamayo, PMHNP-BC  [facility]  Office: [phone]

## 2023-12-15 ENCOUNTER — PATIENT OUTREACH (OUTPATIENT)
Dept: CASE MANAGEMENT | Facility: OTHER | Age: 53
End: 2023-12-15
Payer: COMMERCIAL

## 2023-12-15 ENCOUNTER — TELEPHONE (OUTPATIENT)
Dept: BEHAVIORAL HEALTH | Facility: CLINIC | Age: 53
End: 2023-12-15
Payer: COMMERCIAL

## 2023-12-15 DIAGNOSIS — F51.01 PRIMARY INSOMNIA: ICD-10-CM

## 2023-12-15 DIAGNOSIS — I50.22 CHRONIC HFREF (HEART FAILURE WITH REDUCED EJECTION FRACTION): Primary | ICD-10-CM

## 2023-12-15 DIAGNOSIS — K70.31 ALCOHOLIC CIRRHOSIS OF LIVER WITH ASCITES: ICD-10-CM

## 2023-12-15 DIAGNOSIS — K76.6 PORTAL HYPERTENSION: ICD-10-CM

## 2023-12-15 DIAGNOSIS — F33.0 MILD EPISODE OF RECURRENT MAJOR DEPRESSIVE DISORDER: ICD-10-CM

## 2023-12-15 DIAGNOSIS — E03.9 ACQUIRED HYPOTHYROIDISM: Chronic | ICD-10-CM

## 2023-12-15 DIAGNOSIS — F41.1 GENERALIZED ANXIETY DISORDER: ICD-10-CM

## 2023-12-15 RX ORDER — MIRTAZAPINE 7.5 MG/1
7.5 TABLET, FILM COATED ORAL NIGHTLY
Qty: 60 TABLET | Refills: 1 | Status: SHIPPED | OUTPATIENT
Start: 2023-12-15

## 2023-12-15 NOTE — OUTREACH NOTE
AMBULATORY CASE MANAGEMENT NOTE    Name and Relationship of Patient/Support Person: Greyson Schofield L - Self    [facility] Interim Update    Spoke with patient and doing well other than needing support for medications. He reports he is out of Digoxin and I reviewed his medication list and reinforced he needs to never run out of that as it is a very important heart medication. He reports his Remeron is out as well and noted it was sent in 12/1/23 for 30 days. I explained I will outreach pharmacy for him. He went through more pill bottles and found his Digoxin. His bottle or Remeron is empty. Discussed care plans and updated them.     Care Coordination    Call to [facility] and his Mirtazapine/Remeron 7.5 mg was filled 12/1/23 for 30 days. Will outreach to Kailee's office for support on this.     Outreached to Clarice MONTAÑO MA for Kailee via Epic chat to have her inform provider.         Education Documentation  medication management, taught by Bushra Coy, RN at 12/15/2023  1:50 PM.  Learner: Patient  Readiness: Acceptance  Method: Explanation  Response: Verbalizes Understanding    coping strategies, taught by Bushra Coy, RN at 12/15/2023  1:50 PM.  Learner: Patient  Readiness: Acceptance  Method: Explanation  Response: Verbalizes Understanding          Bushra CHEN  Ambulatory Case Management    12/15/2023, 13:21 EST

## 2023-12-15 NOTE — TELEPHONE ENCOUNTER
Will send in increased dose for mirtazapine 15 mg at bedtime. Would like to advise patient that we should not increase this dosage any higher related to hepatic impairments.

## 2023-12-15 NOTE — TELEPHONE ENCOUNTER
"Called patient in regards to a medication refill he requested by his PCP.  Pt states he has been taking 2 tablets of his mirtazapine 7.5 each night. A script was sent on 12/01/23 with the instructions of 1 tablet nightly. Patient states that he is feeling extra anxious and hasn't slept in 3 days. Patient stresses that he is \"getting into his head a lot\" with being at home alone most days and not having sufficient income to support his self and is feeling guilty with the upcoming holidays      Patient would like to know if he could have an increase sent in or possibly try another medication to help calm him down. Advised patient I would reach out once speaking with the provider.    mirtazapine (REMERON) 7.5 MG tablet (12/01/2023)     "

## 2023-12-28 ENCOUNTER — PATIENT OUTREACH (OUTPATIENT)
Dept: CASE MANAGEMENT | Facility: OTHER | Age: 53
End: 2023-12-28
Payer: COMMERCIAL

## 2023-12-28 DIAGNOSIS — K70.31 ALCOHOLIC CIRRHOSIS OF LIVER WITH ASCITES: ICD-10-CM

## 2023-12-28 DIAGNOSIS — I50.22 CHRONIC HFREF (HEART FAILURE WITH REDUCED EJECTION FRACTION): Primary | ICD-10-CM

## 2023-12-28 DIAGNOSIS — K76.6 PORTAL HYPERTENSION: ICD-10-CM

## 2023-12-28 NOTE — OUTREACH NOTE
[facility] End of Month Documentation    This Chronic Medical Management Care Plan for Greyson Schofield, 53 y.o. male, has been monitored and managed; reviewed; complete and a new plan of care implemented for the month of December.  A cumulative time of 32  minutes was spent on this patient record this month, including phone call with patient; phone call with pharmacist; chart review; electronic communication with primary care provider.    Regarding the patient's problems: has Portal hypertension; Ascites due to alcoholic cirrhosis; Nicotine dependence, chewing tobacco, w unsp disorders; Cirrhosis of liver; Chronic HFrEF (heart failure with reduced ejection fraction); Acquired hypothyroidism; and Traumatic brain injury on their problem list., the following items were addressed: medical records; medications and any changes can be found within the plan section of the note.  A detailed listing of time spent for chronic care management is tracked within each outreach encounter.  Current medications include:  has a current medication list which includes the following prescription(s): amoxicillin, aspirin, b complex vitamins, farxiga, digoxin, duloxetine, furosemide, hydrocodone-acetaminophen, hydroxyzine pamoate, levocetirizine, levothyroxine, meclizine, metoprolol succinate xl, mirtazapine, multivitamin, entresto, spironolactone, thiamine, verquvo, and verquvo. and the patient is reported to be patient is compliant with medication protocol,  Medications are reported to be effective.  Regarding these diagnoses, referrals were made to the following provider(s):  Dental in [facility] for teeth extractions and cardiology at [facility] follow up on file.  All notes on chart for PCP to review.    The patient was monitored remotely for blood pressure; weight; activity level; mood & behavior; medications, reports weight loss continues will discuss with doctor this month.    The patient's physical needs include:  needs met.     The  patient's mental support needs include:  coordination of community providers; increased support    The patient's cognitive support needs include:  needs met    The patient's psychosocial support needs include:  medication management or adherence; continued support    The patient's functional needs include: needs met    The patient's environmental needs include:  not applicable, none    Care Plan overall comments:  reviewed, partially closed, and updated Monitoring status CCM    Refer to previous outreach notes for more information on the areas listed above.    Monthly Billing Diagnoses  (I50.22) Chronic HFrEF (heart failure with reduced ejection fraction)    (K76.6) Portal hypertension    (K70.31) Alcoholic cirrhosis of liver with ascites    Medications   Medications have been reconciled    Care Plan progress this month:      Recently Modified Care Plans Updates made since 11/27/2023 12:00 AM       Anxiety (Adult)           Problem Priority Last Modified     Anxiety Identification (Anxiety) --  9/25/2023  2:43 PM by Bushra Coy RN              Goal Recent Progress Last Modified     Anxiety Symptoms Identified --  11/1/2023 11:18 AM by Bushra Coy RN     Evidence-based guidance:   Assess for presence of additional co-occurring psychiatric comorbidity [e.g., substance use, other anxiety disorder (specific phobia, social anxiety disorder, panic disorder, agoraphobia, substance or medically-induced    anxiety disorder)].   Assess for presence of medical comorbidity (e.g., chronic pain, chronic illness), recent or recurrent trauma or abuse, family history of substance use disorder or mental illness.   Screen for anxiety using standardized, validated tool.   Move gradually from investigating somatic complaints to exploring social or psychologic distress.   Assess for signs and symptoms of anxiety in an atmosphere of hope and optimism.   Facilitate full diagnostic interview when positive screening results are  noted; utilize DSM-5 criteria to determine appropriate diagnosis.   Screen for depression simultaneously due to the frequency of co-occurrence.    Notes:              Task Due Date Last Modified     Identify Anxiety Symptoms and Facilitate Treatment --  12/15/2023  1:11 PM by Bushra Coy RN     Care Management Activities:      - depression screen reviewed  - mental health treatment arranged  - participation in psychiatric services encouraged  - somatic and anxiety symptoms correlated      Notes: 11/1/23 continues Behavioral Health treatment and medication with YADI Tamayo and doing well    12/15 continues therapy and medication management with Kailee and doing much better with Cymbalta and Remron               Problem Priority Last Modified     Symptoms (Anxiety) --  9/25/2023  2:43 PM by Bushra Coy RN              Goal Recent Progress Last Modified     Anxiety Symptoms Monitored and Managed --  12/15/2023  1:09 PM by Bushra Coy RN     Evidence-based guidance:   Discuss adherence to medication therapy; assess and manage barriers, such as costs, side effects, feeling little or no improvement, stigma or low motivation.   Discuss past experiences with psychotropic medication, potential side effects and need to continue medication until treatment becomes effective (e.g., 4 to 8 weeks).   Explain the interplay of stress and physical symptoms, as well as the relationship between illness and emotional problems.   Explore complementary and alternative therapy options, such as applied relaxation, mindfulness, meditation, music therapy, aromatherapy, acupuncture or massage.   Prepare patient for antidepressant pharmacologic therapy which may include additional short-term medications until maintenance medications demonstrate therapeutic effect.    Assess response; monitor and manage side effects.   Prepare patient for use of pharmacologic therapy (e.g., anxiolytic, selective serotonin-reuptake  inhibitor, serotonin norepinephrine-reuptake inhibitor, tricyclic antidepressant, antiepileptic, antipsychotic for comorbid depression,    phosphodiesterase-inhibitor for sexual dysfunction, sedative or hypnotic for sleep disturbance); monitor and manage side effects.   Prepare patient for long-term pharmacologic therapy to prevent relapse (e.g., 12 months after symptom improvement).   Promote cognitive behavioral therapy, individualized to severity of symptoms [e.g., nonfacilitated self-help, facilitated (computerized or manual-based) self-help, face-to-face individual treatment].   Encourage patient to develop a self-management plan that identifies and manages lifestyle triggers (e.g., caffeine, stimulants, nicotine, stress), improves sleep, exercise or physical activity based on tolerance.   Prepare patient for a referral to a psychiatrist when patient has a poor response to treatment, atypical presentation or comorbid psychiatric disorder.   Provide frequent follow-up (e.g., every 2 weeks for the first 6 to 8 weeks of treatment and monthly thereafter).   Explore means to support work reintegration, such as modified working hours, peer support or coaching or a community [employer] program.    Notes:              Task Due Date Last Modified     Alleviate Barriers to Anxiety Treatment Success --  12/15/2023  1:09 PM by Bushra Coy RN     Care Management Activities:      - complementary therapy use encouraged  - coping strategies encouraged  - medication adherence assessment completed  - participation in counseling encouraged      Notes:                Problem Priority Last Modified     Harm or Injury (Anxiety) --  12/15/2023  1:09 PM by Bushra Coy RN              Goal Recent Progress Last Modified     Harm or Injury Prevented --  12/15/2023  1:09 PM by Bushra Coy RN     Evidence-based guidance:   When using selective serotonin-reuptake inhibitor, anticipate bone density scanning and supplementation with  calcium and vitamin D based on presentation and risk factors.   Engage family in providing a safe home environment and in closely monitoring changes in the patient's emotional state, such as negativity, hopelessness and suicidal ideation.   Explore risk of suicide, self-injurious behavior, self-neglect, severe functional impairment and potential harm to others.   Explore use of herbal and over-the-counter medications, such as Maunawili's Wort, Kava or Valerian; address drug interaction concerns.   Maintain frequent, structured and supportive contact, such as by phone, office or home visit; collaborate closely with behavioral health specialists and psychiatry.   Make immediate arrangements for evaluation at a local emergency department, community mental health agency or other psychiatric service when patient expresses positive suicidal ideation with a plan and access to lethal means.   Monitor and manage side effects of pharmacologic therapy, including osteoporotic fractures, fall risk and gastrointestinal disturbances; consider exaggerated side effects in the elderly due to longer elimination half-life.   Provide anticipatory guidance to remove lethal medication and firearms from home; ensure adequate supervision is provided to monitor for increasing risk factors; provide emergency contact information and instructions.    Notes:              Task Due Date Last Modified     Identify and Reduce Risks for Harm or Injury --  12/15/2023  1:09 PM by Bushra Coy RN     Care Management Activities:      - side effects of medication monitored      Notes: 12/15 denies and thought of self harm                    Heart Failure (Adult)           Problem Priority Last Modified     Symptom Exacerbation (Heart Failure) --  9/25/2023  2:43 PM by Bushra Coy RN              Goal Recent Progress Last Modified     Symptom Exacerbation Prevented or Minimized --  11/1/2023 11:20 AM by Bushra Coy RN     Evidence-based guidance:    Perform or review cognitive and/or health literacy screening.   Assess understanding of adherence and barriers to treatment plan, as well as lifestyle changes; develop strategies to address barriers.   Establish a [id number] early intervention process to communicate with primary care provider when signs/symptoms worsen.   Facilitate timely posthospital discharge or emergency department treatment that includes intensive follow-up via telephone calls, home visit, telehealth monitoring and care at multidisciplinary heart failure clinic.   Adjust frequency and intensity of follow-up based on presentation, number of emergency department visits, hospital admissions and frequency and severity of symptom exacerbation.   Facilitate timely visit, usually within 1 week, with primary care provider following hospital discharge.   Collaborate with clinical pharmacist to address adverse drug reactions, drug interactions, subtherapeutic dosage, patient and family education.   Regularly screen for presence of depressive symptoms using a validated tool; consider pharmacologic therapy and/or referral for cognitive behavioral therapy when present.   Refer to community-based services, such as a heart failure support group, community health worker or peer support program.   Review immunization status; arrange receipt of needed vaccinations.   Prepare patient for home oxygen use based on signs/symptoms.    Notes:              Task Due Date Last Modified     Identify and Minimize Risk of Heart Failure Exacerbation --  12/15/2023  1:12 PM by Bushra Coy, RN     Care Management Activities:      - counseling with pharmacist provided  - medication-adherence assessment completed  - self-awareness of signs/symptoms of worsening disease encouraged      Notes: 12/15/23 stressed importance of not running out of Digoxin and explained medication effects               Problem Priority Last Modified     Disease Progression (Heart  Failure) --  12/15/2023  1:14 PM by Bushra Coy RN     Continues to monitor blood pressure and to support orthostatic hypotension and he is well versed on how to manage             Goal Recent Progress Last Modified     Comorbidities Identified and Managed --  12/15/2023  1:14 PM by Bushra Coy RN     Evidence-based guidance:   Assess and address signs/symptoms of comorbidity, including dyslipidemia, diabetes, iron deficiency, gout, arthritis, dysrhythmia, hypertension, cachexia, coronary artery disease, kidney dysfunction and lung disease.   Prepare patient for laboratory and diagnostic exams based on risk and presentation.   Prepare for use of pharmacologic therapy that may include statin, angiotensin converting enzyme (ACE) inhibitor, angiotensin receptor blocker (ARB), beta-blocker, digoxin, antidysrhythmic, diuretic or omega-3 fatty acid.   Monitor side effects and anticipate need for periodic adjustments.   Prepare patient for potential invasive treatment, such as implantable cardioverter-defibrillator, cardiac resynchronization therapy or heart transplant as disease progresses.    Notes:              Task Due Date Last Modified     Identify and Manage Comorbidities --  12/15/2023  1:13 PM by Bushra Coy RN     Care Management Activities:      - medication side effects monitored and managed  - response to pharmacologic therapy monitored  - signs/symptoms of comorbidities identified      Notes:              Goal Recent Progress Last Modified     Health Optimized --  12/15/2023  1:13 PM by Bushra Coy RN     Evidence-based guidance:   Use brief intervention, such as 5 A's (Ask, Advise, Assess, Assist, Arrange) to encourage smoking cessation; refer to smoking cessation program, if ready for more intensive intervention.   Perform or refer to a registered dietitian for a nutrition assessment and nutrition-focused physical exam.    Identify potential micronutrient deficiencies, such as iron, vitamin  D and thiamin.   Assess need for potential diet and fluid modification, such as reduced sodium or fluid intake.   Minimize unnecessary dietary restrictions to increase oral intake. Note: Sodium restriction should be individualized to the patient and clinical status.   Facilitate home monitoring of weight.    Notes:              Task Due Date Last Modified     Optimize Health --  12/15/2023  1:12 PM by Bushra Coy RN     Care Management Activities:      - home monitoring of weight gain or loss encouraged      Notes:                Problem Priority Last Modified     Activity Tolerance (Heart Failure) --  12/15/2023  1:13 PM by Bushra Coy RN              Goal Recent Progress Last Modified     Activity Tolerance Optimized --  12/15/2023  1:13 PM by Bushra Coy RN     Evidence-based guidance:   Promote daily physical activity that improves functional ability, cognition and quality of life.   Encourage reduction in sedentary time.    Encourage optimal, safe functional mobility and self-care performance based on ability and tolerance.    Promote breathing and energy conservation techniques, such as pursed-lip breathing, preplanning and pacing of activity, balancing activity and rest.   Encourage participation in cardiac rehabilitation services.    Notes:                Problem Priority Last Modified     Obstructive Sleep Apnea (Heart Failure) --  12/15/2023  1:13 PM by Bushra Coy RN                          Current Specialty Plan of Care Status signed by both patient and provider    Instructions   Patient was provided an electronic copy of care plan  CCM services were explained and offered and patient has accepted these services.  Patient has given their written consent to receive CCM services and understands that this includes the authorization of electronic communication of medical information with the other treating providers.  Patient understands that they may stop CCM services at any time and these  changes will be effective at the end of the calendar month and will effectively revocate the agreement of CCM services.  Patient understands that only one practitioner can furnish and be paid for CCM services during one calendar month.  Patient also understands that there may be co-payment or deductible fees in association with CCM services.  Patient will continue with at least monthly follow-up calls with the Ambulatory .    Bushra CHEN  Ambulatory Case Management    12/28/2023, 16:58 EST

## 2023-12-29 ENCOUNTER — OFFICE VISIT (OUTPATIENT)
Dept: FAMILY MEDICINE CLINIC | Facility: CLINIC | Age: 53
End: 2023-12-29
Payer: COMMERCIAL

## 2023-12-29 VITALS
WEIGHT: 138.3 LBS | HEART RATE: 103 BPM | TEMPERATURE: 98.4 F | BODY MASS INDEX: 23.04 KG/M2 | OXYGEN SATURATION: 97 % | DIASTOLIC BLOOD PRESSURE: 72 MMHG | SYSTOLIC BLOOD PRESSURE: 124 MMHG | RESPIRATION RATE: 18 BRPM | HEIGHT: 65 IN

## 2023-12-29 DIAGNOSIS — K76.6 PORTAL HYPERTENSION: Chronic | ICD-10-CM

## 2023-12-29 DIAGNOSIS — E03.9 ACQUIRED HYPOTHYROIDISM: Chronic | ICD-10-CM

## 2023-12-29 DIAGNOSIS — F41.1 GENERALIZED ANXIETY DISORDER: ICD-10-CM

## 2023-12-29 DIAGNOSIS — F51.01 PRIMARY INSOMNIA: Primary | ICD-10-CM

## 2023-12-29 DIAGNOSIS — F33.0 MILD EPISODE OF RECURRENT MAJOR DEPRESSIVE DISORDER: ICD-10-CM

## 2023-12-29 PROCEDURE — 99214 OFFICE O/P EST MOD 30 MIN: CPT | Performed by: FAMILY MEDICINE

## 2023-12-29 PROCEDURE — 1160F RVW MEDS BY RX/DR IN RCRD: CPT | Performed by: FAMILY MEDICINE

## 2023-12-29 PROCEDURE — 1159F MED LIST DOCD IN RCRD: CPT | Performed by: FAMILY MEDICINE

## 2023-12-29 RX ORDER — MIRTAZAPINE 30 MG/1
30 TABLET, FILM COATED ORAL NIGHTLY
Qty: 30 TABLET | Refills: 5 | Status: SHIPPED | OUTPATIENT
Start: 2023-12-29

## 2023-12-29 RX ORDER — CHLORHEXIDINE GLUCONATE ORAL RINSE 1.2 MG/ML
15 SOLUTION DENTAL
COMMUNITY
Start: 2023-12-20

## 2023-12-29 RX ORDER — PSEUDOEPHED/ACETAMINOPH/DIPHEN 30MG-500MG
TABLET ORAL
COMMUNITY
Start: 2023-12-05

## 2023-12-29 NOTE — PROGRESS NOTES
"Chief Complaint  Insomnia, Follow-up (3 months), and heart failure with reduced ejection fraction    Subjective        Greyson Schofield presents to [facility]  History of Present Illness  He is here today to for follow-up for the management of his chronic medical conditions.  He is from [place]. He is a chronic alcoholic. He has liver cirrohsis, HFrEF with EF of 10.3%.     The patient's continue to wear a life vest and denies he has had any shocks from the LifeVest since he for started wearing it.    The patient's been taken 22-1/2 mg of Restoril and it has been helping him sleep.  He says 7-1/2 mg Restoril did not help him fall asleep.    The patient has no other complaints today and denies chest pain, shortness of breath, weakness, numbness, nausea, vomiting, diarrhea, dizziness or syncopal event.        Objective   Vital Signs:  /72 (BP Location: Left arm, Patient Position: Sitting, Cuff Size: Adult)   Pulse 103   Temp 98.4 °F (36.9 °C) (Tympanic)   Resp 18   Ht 165.1 cm (65\")   Wt 62.7 kg (138 lb 4.8 oz)   SpO2 97%   BMI 23.01 kg/m²   Estimated body mass index is 23.01 kg/m² as calculated from the following:    Height as of this encounter: 165.1 cm (65\").    Weight as of this encounter: 62.7 kg (138 lb 4.8 oz).       BMI is within normal parameters. No other follow-up for BMI required.      Physical Exam  Vitals reviewed.   Constitutional:       Appearance: Normal appearance. He is well-developed.   HENT:      Head: Normocephalic and atraumatic.      Right Ear: External ear normal.      Left Ear: External ear normal.      Mouth/Throat:      Pharynx: No oropharyngeal exudate.   Eyes:      Conjunctiva/sclera: Conjunctivae normal.      Pupils: Pupils are equal, round, and reactive to light.   Neck:      Vascular: No carotid bruit.   Cardiovascular:      Rate and Rhythm: Normal rate and regular rhythm.      Heart sounds: No murmur heard.     No friction rub. No " gallop.   Pulmonary:      Effort: Pulmonary effort is normal.      Breath sounds: Normal breath sounds. No wheezing or rhonchi.   Abdominal:      General: There is no distension.   Skin:     General: Skin is warm and dry.   Neurological:      Mental Status: He is alert and oriented to person, place, and time.      Cranial Nerves: No cranial nerve deficit.      Motor: No weakness.   Psychiatric:         Mood and Affect: Mood and affect normal.         Behavior: Behavior normal.         Thought Content: Thought content normal.         Judgment: Judgment normal.        Result Review :    CMP          10/14/2023    10:50 11/13/2023    12:13 11/21/2023    12:13   CMP   Glucose 94  83  98    BUN 17  16  21    Creatinine 0.77  1.07  1.39    EGFR 108.4  84.0  61.4    Sodium 135  134  132    Potassium 4.0  4.4  3.9    Chloride 100  96  94    Calcium 9.4  10.2  9.9    Total Protein 7.5   8.4    Albumin 3.9   3.9    Globulin 3.6   4.5    Total Bilirubin 0.2   0.3    Alkaline Phosphatase 133   144    AST (SGOT) 16   22    ALT (SGPT) 13   25    Albumin/Globulin Ratio 1.1   0.9    BUN/Creatinine Ratio 22.1  15.0  15.1    Anion Gap 8.3  11.3  11.6      CBC          10/14/2023    10:50 11/21/2023    12:13 12/5/2023    08:17   CBC   WBC 10.32  7.93  6.69       RBC 2.69  4.26  3.60       Hemoglobin 8.7  12.8  10.8       Hematocrit 26.7  39.6  33.4       MCV 99.3  93.0  93       MCH 32.3  30.0  30.0       MCHC 32.6  32.3  32.3       RDW 13.6  14.5  14.9       Platelets 319  472  351          Details          This result is from an external source.             Lipid Panel          8/22/2023    04:38 8/26/2023    04:45   Lipid Panel   Total Cholesterol 128  162    Triglycerides 60  64    HDL Cholesterol 57  76    VLDL Cholesterol 13  13    LDL Cholesterol  58  73    LDL/HDL Ratio 1.04  0.96      TSH          8/27/2023    04:45 11/13/2023    12:13   TSH   TSH 53.500  28.000                   Assessment and Plan   Diagnoses and all orders  for this visit:    1. Primary insomnia (Primary)  Comments:  The patient was educated about the different sleep aids available.  Will try to continue him on the mirtazapine for now and increase it up to 22-1/2 mg a day.  Orders:  -     mirtazapine (REMERON) 30 MG tablet; Take 1 tablet by mouth Every Night.  Dispense: 30 tablet; Refill: 5    2. Portal hypertension  Comments:  The patient is currently on a liver donor list.  He appears to be stable today and no signs of overt worsening of his liver failure appreciated.    3. Mild episode of recurrent major depressive disorder  -     mirtazapine (REMERON) 30 MG tablet; Take 1 tablet by mouth Every Night.  Dispense: 30 tablet; Refill: 5    4. Generalized anxiety disorder  Assessment & Plan:  Psychological condition is improving with treatment.  Medication changes per orders.  Psychological condition  will be reassessed at the next regular appointment.    Orders:  -     mirtazapine (REMERON) 30 MG tablet; Take 1 tablet by mouth Every Night.  Dispense: 30 tablet; Refill: 5    5. Acquired hypothyroidism  Assessment & Plan:  The thyroid deficiency is currently well-placed medical micrograms of Synthroid daily.               Follow Up   Return in about 3 months (around 3/29/2024).  Patient was given instructions and counseling regarding his condition or for health maintenance advice. Please see specific information pulled into the AVS if appropriate.

## 2023-12-31 PROBLEM — F41.1 GENERALIZED ANXIETY DISORDER: Status: ACTIVE | Noted: 2023-12-31

## 2023-12-31 PROBLEM — F41.1 GENERALIZED ANXIETY DISORDER: Chronic | Status: ACTIVE | Noted: 2023-12-31

## 2024-01-03 ENCOUNTER — PATIENT OUTREACH (OUTPATIENT)
Dept: CASE MANAGEMENT | Facility: OTHER | Age: 54
End: 2024-01-03
Payer: COMMERCIAL

## 2024-01-03 DIAGNOSIS — E03.9 ACQUIRED HYPOTHYROIDISM: Chronic | ICD-10-CM

## 2024-01-03 DIAGNOSIS — K70.31 ALCOHOLIC CIRRHOSIS OF LIVER WITH ASCITES: ICD-10-CM

## 2024-01-03 DIAGNOSIS — K76.6 PORTAL HYPERTENSION: ICD-10-CM

## 2024-01-03 DIAGNOSIS — I50.22 CHRONIC HFREF (HEART FAILURE WITH REDUCED EJECTION FRACTION): Primary | ICD-10-CM

## 2024-01-03 NOTE — OUTREACH NOTE
AMBULATORY CASE MANAGEMENT NOTE    Name and Relationship of Patient/Support Person: sophia cardoza - Emergency Contact    CCM Interim Update    Chart reviewed of PCP follow up end of last month and noted per Dr. Doe he was needing increase in his sleep medication and he was on Restoril 7.5 mg but patient taking 22.5 mg and is helping him sleep.    Noted per labs and chart review he had his Levothyroxine increased to 125 mcg 11/15/23 notes and needed a lab recheck in 6 weeks. That was due 12/27/2.    Call to patient and left a detailed message for him to get his labs done here at [place] office day he comes in 1/5/24 for [place] follow up here and he can do labs at that time.    Outreached to his aunt Salome ch SUBHA to inform of his need for labs at his follow up. She reports she will not be accompanying him but will let someone know the needs.         Education Documentation  safety, taught by Bushra Coy, RN at 1/3/2024  1:01 PM.  Learner: Family  Readiness: Acceptance  Method: Explanation  Response: Verbalizes Understanding  Comment: reinforfced with his aunt need for repeat labs    medication management, taught by Bushra Coy, RN at 1/3/2024  1:01 PM.  Learner: Family  Readiness: Acceptance  Method: Explanation  Response: Verbalizes Understanding  Comment: reinforfced with his aunt need for repeat labs          Bushra H  Ambulatory Case Management    1/3/2024, 12:35 EST

## 2024-01-09 ENCOUNTER — TELEPHONE (OUTPATIENT)
Dept: CASE MANAGEMENT | Facility: OTHER | Age: 54
End: 2024-01-09
Payer: COMMERCIAL

## 2024-01-09 NOTE — TELEPHONE ENCOUNTER
Chart review, noted follow up for his appointment with [facility] but he did not see Kailee on 1/5/24 it is cancelled due to patient id not have a ride per computer notes. H is rescheduled with her on 2/1/24.   He has dental appointment in [place] tomorrow and 1/25/24 11 am follow up with Dr. Li for Advanced Heart Failure and will contact patient after these visits.

## 2024-01-15 ENCOUNTER — PATIENT OUTREACH (OUTPATIENT)
Dept: CASE MANAGEMENT | Facility: OTHER | Age: 54
End: 2024-01-15
Payer: COMMERCIAL

## 2024-01-15 DIAGNOSIS — I50.22 CHRONIC HFREF (HEART FAILURE WITH REDUCED EJECTION FRACTION): Primary | ICD-10-CM

## 2024-01-15 DIAGNOSIS — G47.00 INSOMNIA, UNSPECIFIED TYPE: ICD-10-CM

## 2024-01-15 DIAGNOSIS — F41.1 GENERALIZED ANXIETY DISORDER: Chronic | ICD-10-CM

## 2024-01-15 NOTE — OUTREACH NOTE
AMBULATORY CASE MANAGEMENT NOTE    Name and Relationship of Patient/Support Person:  -     [facility] Interim Update    Noted he has cardio follow up 1/25/24, called to see how sleep is. He reports Dr. Doe gave medicine but not enough and he is out. I reviewed his medication list and explained he sent 30 tablets of 30 mg Remeron in and he has 5 refills on it to [facility] and he reports he did not get anything and they normally call if they have a script ready for him.He had to reschedule his dental appointment due to no ride as his son had COVID and his aunt was out of town.     Plan to outreach after his 1/25/24 cardiology follow up.    Care Coordination    Per Chuck at [facility] they did fill his 12/29/23 script but only for 8 days to get it all lined up with his other medicines and they never gave the rest of the script. He is going to get it together now and call patient.                 Bushra CHEN  Ambulatory Case Management    1/15/2024, 15:19 EST

## 2024-01-26 ENCOUNTER — TELEPHONE (OUTPATIENT)
Dept: CASE MANAGEMENT | Facility: OTHER | Age: 54
End: 2024-01-26
Payer: COMMERCIAL

## 2024-01-26 NOTE — TELEPHONE ENCOUNTER
Chart review from cardiology visit notes 1/25/24 ith [facility] doctor reviewed and plan as attached:     No Known Allergies  Current Outpatient Medications  Medication Sig Dispense Refill  aspirin 81 MG EC tablet Take 81 mg by mouth 1 (one) time each day.  digoxin (Lanoxin) 250 MCG tab;et Take 250 mcg by mouth 1 (one) time each day.  furosemide (Lasix) 40 MG tablet Take 40 mg by mouth 1 (one) time each day.  hydrOXYzine pamoate (Vistaril) 25 MG capsule Take 25 mg by mouth.  levothyroxine (Synthroid, Levoxyl) 125 MCG tablet Take 1 tablet by mouth 1 (one) time each day in the morning.  metoprolol succinate XL (Toprol-XL) 25 MG 24 hr tablet Take 25 mg by mouth 1 (one) time each day.  mirtazapine (Remeron) 30 MG tablet Take 30 mg by mouth every night.    No current facility-administered medications for this visit.    Assessment/Plan  Chronic systolic heart failure  Nonischemic cardiomyopathy  Cirrhosis  Extensive alcohol use history, now quit since 7/2023  Anxiety disorder  Depression  Hypothyroidism    The patient is currently NYHA class II, on physical examination  Heart failure medications including:  Continue Toprol 25 mg p.o. daily discussed  Continue Lasix 40 mg p.o. daily  Dig 250 mcg po daily  Discontinue Farxiga, spironolactone, Entresto, Verquvo due to low blood pressure  Symptoms are very much better after he has stopped these medications.  Continue LifeVest for now, repeat echocardiogram, if still speak to Dr. Galicia for ICD placement  Continue Synthroid  Continue follow-up with psychiatry    RTC in 3 months with TTE prior    Miguel Li MD  [facility]  1/25/2024 1:46 PM      Electronically signed by Miguel Li MD at 01/25/2024 2:05 PM EST       D/C med list from cardiology notes  Discontinued Medications  - documented as of this encounter  Discontinued Medications  Medication Sig Discontinue Reason Start Date End Date   b complex vitamins capsule  Take 1 capsule by mouth 1 (one) time each day.      01/25/2024   DULoxetine (Cymbalta) 60 MG DR capsule  Take 60 mg by mouth 1 (one) time each day.   09/27/2023 01/25/2024   Entresto 24-26 MG tablet  Take 0.5 tablets by mouth 2 (two) times a day.   09/19/2023 01/25/2024   Farxiga 10 MG  Take 10 mg by mouth 1 (one) time each day.   08/31/2023 01/25/2024   levocetirizine (Xyzal) 5 MG tablet  Take 5 mg by mouth 1 (one) time each day.     01/25/2024   Multiple Vitamin (multivitamin) capsule  Take 1 capsule by mouth 1 (one) time each day.   10/26/2023 01/25/2024   spironolactone (Aldactone) 25 MG tablet  Take 25 mg by mouth 1 (one) time each day.   09/19/2023 01/25/2024     Will follow up with PCP on this list and review then outreach to patient to be sure he understands instructions.

## 2024-01-30 ENCOUNTER — TELEPHONE (OUTPATIENT)
Dept: CASE MANAGEMENT | Facility: OTHER | Age: 54
End: 2024-01-30
Payer: COMMERCIAL

## 2024-01-30 ENCOUNTER — PATIENT OUTREACH (OUTPATIENT)
Dept: CASE MANAGEMENT | Facility: OTHER | Age: 54
End: 2024-01-30
Payer: COMMERCIAL

## 2024-01-30 DIAGNOSIS — I50.22 CHRONIC HFREF (HEART FAILURE WITH REDUCED EJECTION FRACTION): Primary | ICD-10-CM

## 2024-01-30 DIAGNOSIS — G47.00 INSOMNIA, UNSPECIFIED TYPE: ICD-10-CM

## 2024-01-30 DIAGNOSIS — F41.1 GENERALIZED ANXIETY DISORDER: ICD-10-CM

## 2024-01-30 NOTE — TELEPHONE ENCOUNTER
Multiple medication changes from 1/25/24 follow up with cardiology, please review and sign off if agree, see Dr. Li's notes in chart.     I spoke with patient and speciality office to confirm.     Patient reports he is only on Dig, Thyroid, ASA, Vertigo med, Sleep pill, and vitamins.     Consulted with cardio about Toprol and he is to be on and they let him know to restart it.     All other medications removed from his list.    TY

## 2024-01-30 NOTE — OUTREACH NOTE
"AMBULATORY CASE MANAGEMENT NOTE    Name and Relationship of Patient/Support Person: Greyson Schofield L - Self    [facility] Interim Update    10:26 am: Spoke with patient to review in full his medication list. He reports what cardiology stated in notes except he is NOT taking Toprol 25 mg daily. He states he stopped all his blood pressure medicines and is feeling great and Dr. Li said he looks way better. I reinforced with notes from specialist her should still be on Torpol as it can be for blood pressure but is also the type of medication (beta-adrenergic blocker) to treat heart failure as well. I will outreach to cardiology per his request to make sure it is ok to be off it. He would also like to know if he needed a BNP lab done as he did not have one day of visit with Dr. Li and he is supposed to get labs for his thyroid for Dr. Doe. He reports he will get these on 2/8/24 when he comes to see Kailee for follow up as he has to depend on others for a ride. He reports his blood pressure is no longer \"bottoming out\" and he has so much more energy and feels good. Thoroughly reviewed both medication lists from cardiology and PCP and will update our system to reflect correct medication list.     Care Coordination    10:48 am: Call to Dr. Li's office and spoke with his office staff Felecia and she took all information and will discuss with provider and let me know plan. They will schedule his TTE and call him prior to this and she is not sure he needs labs for them as his last BNP in November was normal. She will ask about this as well.    11:04 am: Felecia returns call to inform she has spoken to provider and has updated patient and he does not need labs, they will call him about his TTE schedule and he should be taking his Toprol 25 mg daily and patient will restart this and let them know if any adverse effects.      Sending updated medication list to PCP to review and sign off on.       Updated care plans x " 2.      Education Documentation  medication management, taught by Bushra Coy RN at 1/30/2024 11:21 AM.  Learner: Patient  Readiness: Acceptance  Method: Explanation  Response: Verbalizes Understanding, Needs Reinforcement  Comment: discussed medication list and outreached to cardio due to discrepancy, discussed need for follow up labs for thyroid medication adjustment from Nov labs and he will get labs done in Feb, home safety no falls.    Provider Follow-Up, taught by Bushra Coy RN at 1/30/2024 11:21 AM.  Learner: Patient  Readiness: Acceptance  Method: Explanation  Response: Verbalizes Understanding, Needs Reinforcement  Comment: discussed medication list and outreached to cardio due to discrepancy, discussed need for follow up labs for thyroid medication adjustment from Nov labs and he will get labs done in Feb, home safety no falls.    Medication Management, taught by Bushra Coy RN at 1/30/2024 11:21 AM.  Learner: Patient  Readiness: Acceptance  Method: Explanation  Response: Verbalizes Understanding, Needs Reinforcement  Comment: discussed medication list and outreached to cardio due to discrepancy, discussed need for follow up labs for thyroid medication adjustment from Nov labs and he will get labs done in Feb, home safety no falls.    Hypertension, taught by Bushra Coy RN at 1/30/2024 11:21 AM.  Learner: Patient  Readiness: Acceptance  Method: Explanation  Response: Verbalizes Understanding, Needs Reinforcement  Comment: discussed medication list and outreached to cardio due to discrepancy, discussed need for follow up labs for thyroid medication adjustment from Nov labs and he will get labs done in Feb, home safety no falls.    Signs/Symptoms, taught by Bushra Coy RN at 1/30/2024 11:21 AM.  Learner: Patient  Readiness: Acceptance  Method: Explanation  Response: Verbalizes Understanding, Needs Reinforcement  Comment: discussed medication list and outreached to cardio due to  discrepancy, discussed need for follow up labs for thyroid medication adjustment from Nov labs and he will get labs done in Feb, home safety no falls.    Home Safety, taught by Bushra Coy, RN at 1/30/2024 11:21 AM.  Learner: Patient  Readiness: Acceptance  Method: Explanation  Response: Verbalizes Understanding, Needs Reinforcement  Comment: discussed medication list and outreached to cardio due to discrepancy, discussed need for follow up labs for thyroid medication adjustment from Nov labs and he will get labs done in Feb, home safety no falls.          Bushra CHEN  Ambulatory Case Management    1/30/2024, 11:08 EST

## 2024-01-31 ENCOUNTER — PATIENT OUTREACH (OUTPATIENT)
Dept: CASE MANAGEMENT | Facility: OTHER | Age: 54
End: 2024-01-31
Payer: COMMERCIAL

## 2024-01-31 DIAGNOSIS — G47.00 INSOMNIA, UNSPECIFIED TYPE: ICD-10-CM

## 2024-01-31 DIAGNOSIS — K70.31 ALCOHOLIC CIRRHOSIS OF LIVER WITH ASCITES: ICD-10-CM

## 2024-01-31 DIAGNOSIS — I50.22 CHRONIC HFREF (HEART FAILURE WITH REDUCED EJECTION FRACTION): Primary | ICD-10-CM

## 2024-01-31 DIAGNOSIS — F41.1 GENERALIZED ANXIETY DISORDER: ICD-10-CM

## 2024-01-31 NOTE — OUTREACH NOTE
[facility] End of Month Documentation    This Chronic Medical Management Care Plan for Greyson Schofield, 53 y.o. male, has been monitored and managed; reviewed and a new plan of care implemented for the month of January.  A cumulative time of 83  minutes was spent on this patient record this month, including phone call with patient; phone call with relative; electronic communication with primary care provider; face to face with provider; chart review.    Regarding the patient's problems: has Portal hypertension; Ascites due to alcoholic cirrhosis; Nicotine dependence, chewing tobacco, w unsp disorders; Cirrhosis of liver; Chronic HFrEF (heart failure with reduced ejection fraction); Acquired hypothyroidism; Traumatic brain injury; and Generalized anxiety disorder on their problem list., the following items were addressed: medical records; medications; changes to medical care and any changes can be found within the plan section of the note.  A detailed listing of time spent for chronic care management is tracked within each outreach encounter.  Current medications include:  has a current medication list which includes the following prescription(s): acetaminophen extra strength, aspirin, digoxin, furosemide, levothyroxine, meclizine, metoprolol succinate xl, mirtazapine, and multivitamin. and the patient is reported to be patient is compliant with medication protocol,  Medications are reported to be non-effective in controlling symptoms and changes have been made to the medication protocol, multiple medication changes per cardiology this month.  Regarding these diagnoses, referrals were made to the following provider(s):  Follow up appointment with Dr. Guillermo Rutherford cardiology.  All notes on chart for PCP to review.    The patient was monitored remotely for weight; activity level; mood & behavior; medications, sleep schedule.    The patient's physical needs include:  needs met.     The patient's mental support needs include:   coordination of community providers, call to pharmacy and confirmed his appointment with behavioral health    The patient's cognitive support needs include:  needs met    The patient's psychosocial support needs include:  continued support; coordination of community providers; medication management or adherence    The patient's functional needs include: needs met    The patient's environmental needs include:  not applicable, none    Care Plan overall comments:  reviewed    Refer to previous outreach notes for more information on the areas listed above.    Monthly Billing Diagnoses  (I50.22) Chronic HFrEF (heart failure with reduced ejection fraction)    (F41.1) Generalized anxiety disorder    (G47.00) Insomnia, unspecified type    (K70.31) Alcoholic cirrhosis of liver with ascites    Medications   Medications have been reconciled    Care Plan progress this month:      Recently Modified Care Plans Updates made since 12/31/2023 12:00 AM       Heart Failure (Adult)           Problem Priority Last Modified     Symptom Exacerbation (Heart Failure) --  9/25/2023  2:43 PM by Bushra Coy RN              Goal Recent Progress Last Modified     Symptom Exacerbation Prevented or Minimized On track  1/30/2024 11:23 AM by Bushra Coy RN     Evidence-based guidance:   Perform or review cognitive and/or health literacy screening.   Assess understanding of adherence and barriers to treatment plan, as well as lifestyle changes; develop strategies to address barriers.   Establish a ceyyxopw-[id number] early intervention process to communicate with primary care provider when signs/symptoms worsen.   Facilitate timely posthospital discharge or emergency department treatment that includes intensive follow-up via telephone calls, home visit, telehealth monitoring and care at multidisciplinary heart failure clinic.   Adjust frequency and intensity of follow-up based on presentation, number of emergency department visits, hospital  admissions and frequency and severity of symptom exacerbation.   Facilitate timely visit, usually within 1 week, with primary care provider following hospital discharge.   Collaborate with clinical pharmacist to address adverse drug reactions, drug interactions, subtherapeutic dosage, patient and family education.   Regularly screen for presence of depressive symptoms using a validated tool; consider pharmacologic therapy and/or referral for cognitive behavioral therapy when present.   Refer to community-based services, such as a heart failure support group, community health worker or peer support program.   Review immunization status; arrange receipt of needed vaccinations.   Prepare patient for home oxygen use based on signs/symptoms.    Notes:              Task Due Date Last Modified     Identify and Minimize Risk of Heart Failure Exacerbation --  1/30/2024 11:24 AM by Bushra Coy RN     Care Management Activities:      - counseling with pharmacist provided  - medication-adherence assessment completed  - self-awareness of signs/symptoms of worsening disease encouraged      Notes: 12/15/23 stressed importance of not running out of Digoxin and explained medication effects    1/30/24 review of medications done and discussed importance of medication regimen following with cardiology reports and discussed with cardio and patient, on right track for medication management now                         Current Specialty Plan of Care Status signed by both patient and provider    Instructions   Patient was provided an electronic copy of care plan  CCM services were explained and offered and patient has accepted these services.  Patient has given their written consent to receive CCM services and understands that this includes the authorization of electronic communication of medical information with the other treating providers.  Patient understands that they may stop CCM services at any time and these changes will be  effective at the end of the calendar month and will effectively revocate the agreement of CCM services.  Patient understands that only one practitioner can furnish and be paid for CCM services during one calendar month.  Patient also understands that there may be co-payment or deductible fees in association with CCM services.  Patient will continue with at least monthly follow-up calls with the Ambulatory .    Bushra CHEN  Ambulatory Case Management    1/31/2024, 10:31 EST

## 2024-02-01 DIAGNOSIS — F33.0 MILD EPISODE OF RECURRENT MAJOR DEPRESSIVE DISORDER: ICD-10-CM

## 2024-02-01 DIAGNOSIS — F41.1 GENERALIZED ANXIETY DISORDER: ICD-10-CM

## 2024-02-01 RX ORDER — DULOXETIN HYDROCHLORIDE 60 MG/1
60 CAPSULE, DELAYED RELEASE ORAL 2 TIMES DAILY
Qty: 60 CAPSULE | Refills: 1 | Status: SHIPPED | OUTPATIENT
Start: 2024-02-01

## 2024-02-01 NOTE — TELEPHONE ENCOUNTER
REFILL REQUEST FROM PHARMACY FOR PTS CYMBALTA 60 MG CAPSULES.    FOLLOW UP APPT ON 02/08/2024.  PT LAST SEEN ON 12/01/2023.  PT CANCELED APPT ON 01/05/2024 AND ON 02/01/2024.    THIS MEDICATION WAS DISCONTINUED ON 01/30/2024.   DULoxetine (Cymbalta) 60 MG capsule (12/13/2023)

## 2024-02-09 ENCOUNTER — OFFICE VISIT (OUTPATIENT)
Dept: BEHAVIORAL HEALTH | Facility: CLINIC | Age: 54
End: 2024-02-09
Payer: COMMERCIAL

## 2024-02-09 VITALS
HEART RATE: 92 BPM | HEIGHT: 65 IN | BODY MASS INDEX: 23.74 KG/M2 | WEIGHT: 142.5 LBS | DIASTOLIC BLOOD PRESSURE: 72 MMHG | SYSTOLIC BLOOD PRESSURE: 126 MMHG

## 2024-02-09 DIAGNOSIS — F41.1 GENERALIZED ANXIETY DISORDER: ICD-10-CM

## 2024-02-09 DIAGNOSIS — F33.0 MILD EPISODE OF RECURRENT MAJOR DEPRESSIVE DISORDER: Primary | ICD-10-CM

## 2024-02-09 DIAGNOSIS — F51.01 PRIMARY INSOMNIA: ICD-10-CM

## 2024-02-09 DIAGNOSIS — F10.21 ALCOHOL USE DISORDER, SEVERE, IN EARLY REMISSION: ICD-10-CM

## 2024-02-09 NOTE — PATIENT INSTRUCTIONS
1.  Please return to clinic at your next scheduled visit.  Please contact the clinic ([phone]) at least 24 hours prior in the event you need to cancel.  2.  Do no harm to yourself or others.    3.  Avoid alcohol and drugs.    4.  Take all medications as prescribed.  Please contact the clinic with any concerns. If you are in need of medication refills, please call the clinic at [phone].    5. Should you want to get in touch with your provider, YADI Tamayo, please contact the office ([phone]), and staff will be able to page Kailee directly.  6. In the event you have personal crisis, contact the following crisis numbers: Suicide Prevention Hotline 1-317.823.4166; [facility] Helpline 4-803-952-VPNW; [facility] [phone]; text HELLO to [phone]; or 939.     SPECIFIC RECOMMENDATIONS:     1.      Medications discussed at this encounter:     No orders of the defined types were placed in this encounter.                      2.      Psychotherapy recommendations: We will continue therapy at future visits.     3.     Return to clinic: Return in about 6 weeks (around 3/22/2024) for Next scheduled follow up.
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
## 2024-02-12 ENCOUNTER — PATIENT OUTREACH (OUTPATIENT)
Dept: CASE MANAGEMENT | Facility: OTHER | Age: 54
End: 2024-02-12
Payer: COMMERCIAL

## 2024-02-12 DIAGNOSIS — G47.00 INSOMNIA, UNSPECIFIED TYPE: ICD-10-CM

## 2024-02-12 DIAGNOSIS — K70.31 ALCOHOLIC CIRRHOSIS OF LIVER WITH ASCITES: Chronic | ICD-10-CM

## 2024-02-12 DIAGNOSIS — I50.22 CHRONIC HFREF (HEART FAILURE WITH REDUCED EJECTION FRACTION): Primary | ICD-10-CM

## 2024-02-12 DIAGNOSIS — F41.1 GENERALIZED ANXIETY DISORDER: ICD-10-CM

## 2024-02-28 ENCOUNTER — PATIENT OUTREACH (OUTPATIENT)
Dept: CASE MANAGEMENT | Facility: OTHER | Age: 54
End: 2024-02-28
Payer: COMMERCIAL

## 2024-02-28 DIAGNOSIS — G47.00 INSOMNIA, UNSPECIFIED TYPE: ICD-10-CM

## 2024-02-28 DIAGNOSIS — I50.22 CHRONIC HFREF (HEART FAILURE WITH REDUCED EJECTION FRACTION): Primary | ICD-10-CM

## 2024-02-28 DIAGNOSIS — F41.1 GENERALIZED ANXIETY DISORDER: ICD-10-CM

## 2024-02-28 NOTE — OUTREACH NOTE
[facility] End of Month Documentation    This Chronic Medical Management Care Plan for Greyson Schofield, 53 y.o. male, has been monitored and managed; reviewed; revised and a new plan of care implemented for the month of February.  A cumulative time of 23  minutes was spent on this patient record this month, including phone call with patient; chart review; electronic communication with other providers.    Regarding the patient's problems: has Portal hypertension; Ascites due to alcoholic cirrhosis; Nicotine dependence, chewing tobacco, w unsp disorders; Cirrhosis of liver; Chronic HFrEF (heart failure with reduced ejection fraction); Acquired hypothyroidism; Traumatic brain injury; and Generalized anxiety disorder on their problem list., the following items were addressed: medical records; medications and any changes can be found within the plan section of the note.  A detailed listing of time spent for chronic care management is tracked within each outreach encounter.  Current medications include:  has a current medication list which includes the following prescription(s): acetaminophen extra strength, aspirin, digoxin, furosemide, levothyroxine, meclizine, metoprolol succinate xl, mirtazapine, and multivitamin. and the patient is reported to be patient is compliant with medication protocol,  Medications are reported to be effective.  Regarding these diagnoses, referrals were made to the following provider(s):  Dental and Behavioral Health visits this month.  All notes on chart for PCP to review.    The patient was monitored remotely for blood pressure; weight; activity level; mood & behavior; medications, sleep schedule.    The patient's physical needs include:  needs met.     The patient's mental support needs include:  coordination of community providers, needs for appointment times changed    The patient's cognitive support needs include:  needs met    The patient's psychosocial support needs include:  coordination of  community providers; medication management or adherence; continued support    The patient's functional needs include: needs met    The patient's environmental needs include:  not applicable, none    Care Plan overall comments:  reviewed and revised and partially closed    Refer to previous outreach notes for more information on the areas listed above.    Monthly Billing Diagnoses  (I50.22) Chronic HFrEF (heart failure with reduced ejection fraction)    (F41.1) Generalized anxiety disorder    (G47.00) Insomnia, unspecified type    Medications   Medications have been reconciled    Care Plan progress this month:      Recently Modified Care Plans Updates made since 1/28/2024 12:00 AM       Anxiety (Adult)           Problem Priority Last Modified     Anxiety Identification (Anxiety) --  9/25/2023  2:43 PM by Bushra Coy RN              Goal Recent Progress Last Modified     Anxiety Symptoms Identified On track  2/12/2024  3:50 PM by Bushra Coy, RN     Evidence-based guidance:   Assess for presence of additional co-occurring psychiatric comorbidity [e.g., substance use, other anxiety disorder (specific phobia, social anxiety disorder, panic disorder, agoraphobia, substance or medically-induced    anxiety disorder)].   Assess for presence of medical comorbidity (e.g., chronic pain, chronic illness), recent or recurrent trauma or abuse, family history of substance use disorder or mental illness.   Screen for anxiety using standardized, validated tool.   Move gradually from investigating somatic complaints to exploring social or psychologic distress.   Assess for signs and symptoms of anxiety in an atmosphere of hope and optimism.   Facilitate full diagnostic interview when positive screening results are noted; utilize DSM-5 criteria to determine appropriate diagnosis.   Screen for depression simultaneously due to the frequency of co-occurrence.    Notes:              Task Due Date Last Modified     Identify Anxiety  Symptoms and Facilitate Treatment --  2/12/2024  3:51 PM by Bushra Coy RN     Care Management Activities:      - depression screen reviewed  - mental health treatment arranged  - participation in psychiatric services encouraged  - somatic and anxiety symptoms correlated      Notes: 11/1/23 continues Behavioral Health treatment and medication with YADI Tamayo and doing well    12/15 continues therapy and medication management with Kailee and doing much better with Cymbalta and Remron    2/12/24 medications managed by behavioral health and PCP and sleeping much better and anxiety is improved               Problem Priority Last Modified     Symptoms (Anxiety) --  2/12/2024  3:51 PM by Bushra Coy RN                     Heart Failure (Adult)           Problem Priority Last Modified     Symptom Exacerbation (Heart Failure) --  9/25/2023  2:43 PM by Bushra Coy RN              Goal Recent Progress Last Modified     Symptom Exacerbation Prevented or Minimized On track  2/12/2024  3:51 PM by Bushra Coy RN     Evidence-based guidance:   Perform or review cognitive and/or health literacy screening.   Assess understanding of adherence and barriers to treatment plan, as well as lifestyle changes; develop strategies to address barriers.   Establish a [id number] early intervention process to communicate with primary care provider when signs/symptoms worsen.   Facilitate timely posthospital discharge or emergency department treatment that includes intensive follow-up via telephone calls, home visit, telehealth monitoring and care at multidisciplinary heart failure clinic.   Adjust frequency and intensity of follow-up based on presentation, number of emergency department visits, hospital admissions and frequency and severity of symptom exacerbation.   Facilitate timely visit, usually within 1 week, with primary care provider following hospital discharge.   Collaborate with clinical pharmacist  to address adverse drug reactions, drug interactions, subtherapeutic dosage, patient and family education.   Regularly screen for presence of depressive symptoms using a validated tool; consider pharmacologic therapy and/or referral for cognitive behavioral therapy when present.   Refer to community-based services, such as a heart failure support group, community health worker or peer support program.   Review immunization status; arrange receipt of needed vaccinations.   Prepare patient for home oxygen use based on signs/symptoms.    Notes:              Task Due Date Last Modified     Identify and Minimize Risk of Heart Failure Exacerbation --  2/12/2024  3:52 PM by Bushra Coy RN     Care Management Activities:      - counseling with pharmacist provided  - medication-adherence assessment completed  - self-awareness of signs/symptoms of worsening disease encouraged      Notes: 12/15/23 stressed importance of not running out of Digoxin and explained medication effects    1/30/24 review of medications done and discussed importance of medication regimen following with cardiology reports and discussed with cardio and patient, on right track for medication management now    2/14/24 labs done and he is continuing to follow cardiology and doing well.                          Current Specialty Plan of Care Status signed by both patient and provider    Instructions   Patient was provided an electronic copy of care plan  CCM services were explained and offered and patient has accepted these services.  Patient has given their written consent to receive CCM services and understands that this includes the authorization of electronic communication of medical information with the other treating providers.  Patient understands that they may stop CCM services at any time and these changes will be effective at the end of the calendar month and will effectively revocate the agreement of CCM services.  Patient understands that  only one practitioner can furnish and be paid for CCM services during one calendar month.  Patient also understands that there may be co-payment or deductible fees in association with CCM services.  Patient will continue with at least monthly follow-up calls with the Ambulatory .    Bushra CHEN  Ambulatory Case Management    2/28/2024, 15:51 EST

## 2024-03-07 ENCOUNTER — HOSPITAL ENCOUNTER (EMERGENCY)
Facility: HOSPITAL | Age: 54
Discharge: HOME | End: 2024-03-07
Attending: EMERGENCY MEDICINE
Payer: COMMERCIAL

## 2024-03-07 VITALS
SYSTOLIC BLOOD PRESSURE: 118 MMHG | TEMPERATURE: 98.3 F | DIASTOLIC BLOOD PRESSURE: 68 MMHG | HEART RATE: 79 BPM | OXYGEN SATURATION: 100 % | RESPIRATION RATE: 16 BRPM

## 2024-03-07 DIAGNOSIS — M54.41 ACUTE RIGHT-SIDED LOW BACK PAIN WITH RIGHT-SIDED SCIATICA: ICD-10-CM

## 2024-03-07 DIAGNOSIS — M54.31 RIGHT SIDED SCIATICA: Primary | ICD-10-CM

## 2024-03-07 PROCEDURE — 99283 EMERGENCY DEPT VISIT LOW MDM: CPT

## 2024-03-07 PROCEDURE — 96372 THER/PROPH/DIAG INJ SC/IM: CPT

## 2024-03-07 PROCEDURE — 25010000002 DEXAMETHASONE PER 1 MG: Performed by: EMERGENCY MEDICINE

## 2024-03-07 PROCEDURE — 25010000002 KETOROLAC TROMETHAMINE PER 15 MG: Performed by: EMERGENCY MEDICINE

## 2024-03-07 PROCEDURE — 25010000002 HYDROMORPHONE 1 MG/ML SOLUTION: Performed by: EMERGENCY MEDICINE

## 2024-03-07 PROCEDURE — 97110 THERAPEUTIC EXERCISES: CPT | Performed by: PHYSICAL THERAPIST

## 2024-03-07 PROCEDURE — 97161 PT EVAL LOW COMPLEX 20 MIN: CPT | Performed by: PHYSICAL THERAPIST

## 2024-03-07 RX ORDER — HYDROCODONE BITARTRATE AND ACETAMINOPHEN 5; 325 MG/1; MG/1
1 TABLET ORAL ONCE AS NEEDED
Status: DISCONTINUED | OUTPATIENT
Start: 2024-03-07 | End: 2024-03-07

## 2024-03-07 RX ORDER — HYDROCODONE BITARTRATE AND ACETAMINOPHEN 7.5; 325 MG/1; MG/1
1 TABLET ORAL EVERY 6 HOURS PRN
Qty: 12 TABLET | Refills: 0 | Status: SHIPPED | OUTPATIENT
Start: 2024-03-07

## 2024-03-07 RX ORDER — KETOROLAC TROMETHAMINE 15 MG/ML
15 INJECTION, SOLUTION INTRAMUSCULAR; INTRAVENOUS ONCE
Status: COMPLETED | OUTPATIENT
Start: 2024-03-07 | End: 2024-03-07

## 2024-03-07 RX ADMIN — KETOROLAC TROMETHAMINE 15 MG: 15 INJECTION, SOLUTION INTRAMUSCULAR; INTRAVENOUS at 08:08

## 2024-03-07 RX ADMIN — DEXAMETHASONE SODIUM PHOSPHATE 10 MG: 10 INJECTION INTRAMUSCULAR; INTRAVENOUS at 08:08

## 2024-03-07 RX ADMIN — HYDROMORPHONE HYDROCHLORIDE 1 MG: 1 INJECTION, SOLUTION INTRAMUSCULAR; INTRAVENOUS; SUBCUTANEOUS at 08:15

## 2024-03-07 NOTE — DISCHARGE INSTRUCTIONS
Take medications as prescribed.  Apply warm moist compresses alternating with cold packs several times per day.  Do the exercises as advised by physical therapist today.    Return for worsening symptoms such as loss of bowel or bladder control, worsening pain, or any new concerns.

## 2024-03-07 NOTE — ED PROVIDER NOTES
Time: 7:42 AM EST  Date of encounter:  3/7/2024  Independent Historian/Clinical History and Information was obtained by:   Patient    History is limited by: N/A    Chief Complaint: Sciatica      History of Present Illness:  Patient is a 53 y.o. year old male who was brought to the emergency department is and for evaluation of right leg pain that radiates up into his left shoulder.  Patient states that he slipped and fell asleep on the couch last night and woke this morning around 4 AM with severe right leg pain.  Patient reports history of sciatica and states this feels very similar.  Patient denies recent injury such as a fall or heavy lifting.  Patient states that he typically wears a LifeVest but did not bring it because he was in too much pain.  Patient denies loss of bowel or bladder control.    HPI    Patient Care Team  Primary Care Provider: Bertha Doe DO    Past Medical History:     No Known Allergies  Past Medical History:   Diagnosis Date    Arthritis     CHF (congestive heart failure)     Cirrhosis      Past Surgical History:   Procedure Laterality Date    APPENDECTOMY      CARDIAC CATHETERIZATION N/A 8/25/2023    Procedure: Left Heart Cath with possible coronary angioplasty;  Surgeon: Jomar Lynch MD;  Location: [facility] CATH INVASIVE LOCATION;  Service: Cardiology;  Laterality: N/A;     History reviewed. No pertinent family history.    Home Medications:  Prior to Admission medications    Medication Sig Start Date End Date Taking? Authorizing Provider   Acetaminophen Extra Strength 500 MG tablet  12/5/23   Provider, MD Gertrudis   aspirin 81 MG EC tablet Take 1 tablet by mouth Daily. 10/5/23   Bertha Doe DO   digoxin (LANOXIN) 250 MCG tablet Take 1 tablet by mouth Daily. 9/5/23   Lani Galeas MD   furosemide (LASIX) 40 MG tablet Take 1 tablet by mouth Daily. 10/5/23   Bertha Doe DO   levothyroxine (Synthroid) 125 MCG tablet Take 1 tablet by mouth Daily. 11/17/23   Bertha Doe DO    meclizine (ANTIVERT) 25 MG tablet TAKE ONE TABLET BY MOUTH THREE TIMES DAILY AS NEEDED FOR dizziness  Patient not taking: Reported on 2/9/2024 10/26/23   Gertrudis Wood MD   metoprolol succinate XL (TOPROL-XL) 25 MG 24 hr tablet Take 1 tablet by mouth Every Night. 10/5/23   Bertha Doe DO   mirtazapine (REMERON) 30 MG tablet Take 1 tablet by mouth Every Night.  Patient not taking: Reported on 2/9/2024 12/29/23   Bertha Doe DO   Multivitamin tablet tablet  10/26/23   Provider, MD Gertrudis        Social History:   Social History     Tobacco Use    Smoking status: Every Day     Current packs/day: 0.25     Average packs/day: 0.3 packs/day for 33.0 years (8.3 ttl pk-yrs)     Types: Cigarettes     Start date: 10/1993    Smokeless tobacco: Current     Types: Snuff   Vaping Use    Vaping status: Some Days    Substances: Nicotine    Devices: Disposable   Substance Use Topics    Alcohol use: Not Currently     Comment: history of alcohol abuse    Drug use: Not Currently     Types: Marijuana, Cocaine(coke)         Review of Systems:  Review of Systems   Musculoskeletal:  Positive for back pain.   All other systems reviewed and are negative.       Physical Exam:  /68 (BP Location: Right arm, Patient Position: Sitting)   Pulse 79   Temp 98.3 °F (36.8 °C) (Oral)   Resp 16   SpO2 100%     Physical Exam  Vitals and nursing note reviewed.   Constitutional:       General: He is in acute distress (Appears in pain).      Appearance: Normal appearance. He is not toxic-appearing.   HENT:      Head: Normocephalic and atraumatic.      Mouth/Throat:      Mouth: Mucous membranes are moist.   Eyes:      General: No scleral icterus.  Cardiovascular:      Rate and Rhythm: Normal rate and regular rhythm.      Pulses:           Radial pulses are 2+ on the right side and 2+ on the left side.        Dorsalis pedis pulses are 2+ on the right side and 2+ on the left side.      Heart sounds: Normal heart sounds.    Pulmonary:      Effort: Pulmonary effort is normal. No respiratory distress.      Breath sounds: Normal breath sounds.   Abdominal:      General: Abdomen is flat. Bowel sounds are normal.      Palpations: Abdomen is soft.      Tenderness: There is no abdominal tenderness.   Musculoskeletal:         General: Normal range of motion.      Cervical back: Normal range of motion and neck supple.      Lumbar back: Tenderness (Right paraspinal) present. Positive right straight leg raise test. Negative left straight leg raise test.        Back:       Right hip: Tenderness (Over greater trochanter) present.      Comments: No saddle anesthesia   Skin:     General: Skin is warm and dry.   Neurological:      Mental Status: He is alert and oriented to person, place, and time. Mental status is at baseline.                  Procedures:  Procedures      Medical Decision Making:      Comorbidities that affect care:    Cirrhosis, Congestive Heart Failure, Thyroid Disease    External Notes reviewed:          The following orders were placed and all results were independently analyzed by me:  Orders Placed This Encounter   Procedures    PT Plan of Care Cert / Re-Cert       Medications Given in the Emergency Department:  Medications   dexAMETHasone (DECADRON) 10 MG/ML oral solution 10 mg (10 mg Oral Given 3/7/24 0808)   ketorolac (TORADOL) injection 15 mg (15 mg Intramuscular Given 3/7/24 0808)   HYDROmorphone (DILAUDID) injection 1 mg (1 mg Intramuscular Given 3/7/24 0815)        ED Course:         Labs:    Lab Results (last 24 hours)       ** No results found for the last 24 hours. **             Imaging:    No Radiology Exams Resulted Within Past 24 Hours      Differential Diagnosis and Discussion:    Back Pain: The patient presents with back pain. My differential diagnosis includes but is not limited to acute spinal epidural abscess, acute spinal epidural bleed, cauda equina syndrome, abdominal aortic aneurysm, aortic dissection,  kidney stone, pyelonephritis, musculoskeletal back pain, spinal fracture, and osteoarthritis.         MDM  Number of Diagnoses or Management Options  Acute right-sided low back pain with right-sided sciatica  Diagnosis management comments: The patient´s symptoms are consistent with musculoskeletal back pain similar to back pain episodes in the past.  I consider ordering x-rays, however there is been no traumatic new injury.  The patient is now resting comfortably, feels better, is alert, talkative, interactive and in no distress. The repeat examination is unremarkable and benign. The patient is neurologically intact and is ambulatory in the ED. The patient has no fever, no bowel or bladder incontinence, no saddle anesthesia, and is otherwise alert and well appearing. The history, physical exam, and diagnostics (if any) do not suggest the presence of acute spinal epidural abscess, acute spinal epidural bleed, cauda equina syndrome, abdominal aortic aneurysm, aortic dissection or other process requiring further testing, treatment or consultation in the emergency department. The vital signs have been stable. The patient's condition is stable and appropriate for discharge. The patient will pursue further outpatient evaluation with the primary care physician or other designated for consulting position as indicated in the discharge instructions.    Risk of Complications, Morbidity, and/or Mortality  Presenting problems: low  Diagnostic procedures: minimal  Management options: minimal    Patient Progress  Patient progress: improved                 Patient Care Considerations:    I considered ordering lumbar series, however the patient denies any recent injury and back pain is not appear of traumatic origin.      Consultants/Shared Management Plan:        Social Determinants of Health:    Patient is independent, reliable, and has access to care.       Disposition and Care Coordination:    Discharged: The patient is suitable  and stable for discharge with no need for consideration of admission.    I have explained discharge medications and the need for follow up with the patient/caretakers. This was also printed in the discharge instructions. Patient was discharged with the following medications and follow up:      Medication List        New Prescriptions      HYDROcodone-acetaminophen 7.5-325 MG per tablet  Commonly known as: NORCO  Take 1 tablet by mouth Every 6 (Six) Hours As Needed for Moderate Pain.               Where to Get Your Medications        These medications were sent to [facility] - [place], [place] - [address] - [phone]  - [phone]   [address] [facility] [address]      Phone: [phone]   HYDROcodone-acetaminophen 7.5-325 MG per tablet      Bertha Doe  [address]  [address] [address] [phone]    Call   As needed       Final diagnoses:   Acute right-sided low back pain with right-sided sciatica        ED Disposition       ED Disposition   Discharge    Condition   Stable    Comment   --               This medical record created using voice recognition software.             Sabrina Dumont, APRN  03/07/24 [id number]

## 2024-03-07 NOTE — THERAPY EVALUATION
Patient Name: Greyson Schofield  : 1970    MRN: [id number]                              Today's Date: 3/7/2024       Admit Date: 3/7/2024    Visit Dx:     ICD-10-CM ICD-9-CM   1. Right sided sciatica  M54.31 724.3     Patient Active Problem List   Diagnosis    Portal hypertension    Ascites due to alcoholic cirrhosis    Nicotine dependence, chewing tobacco, w unsp disorders    Cirrhosis of liver    Chronic HFrEF (heart failure with reduced ejection fraction)    Acquired hypothyroidism    Traumatic brain injury    Generalized anxiety disorder     Past Medical History:   Diagnosis Date    Arthritis     CHF (congestive heart failure)     Cirrhosis      Past Surgical History:   Procedure Laterality Date    APPENDECTOMY      CARDIAC CATHETERIZATION N/A 2023    Procedure: Left Heart Cath with possible coronary angioplasty;  Surgeon: Jomar Lynch MD;  Location: [facility] CATH INVASIVE LOCATION;  Service: Cardiology;  Laterality: N/A;      General Information       Row Name 24 0854          Physical Therapy Time and Intention    Document Type evaluation  -LR     Mode of Treatment individual therapy  -LR       Row Name 24 0854          General Information    Patient Profile Reviewed yes  -LR     Prior Level of Function independent:  -LR               User Key  (r) = Recorded By, (t) = Taken By, (c) = Cosigned By      Initials Name Provider Type    LR Leyla Roa, PT Physical Therapist                  History: Patient reports he slept on a loveseat last night and woke up at 4 AM with pain in his right leg in his lower back.  Patient reports a history of sciatica once before.  Patient states tingling in his toes.  He states the worst of his pain is in his back and on the right side of his lower back.    Objective:    Palpation: Tender to palpation at right greater trochanter, right piriformis, right quadratus lumborum, right lumbar paraspinals; slight tenderness over lumbar spinous  processes    ROM:  Lumbar ROM: Unable to assess as patient is in too much pain to move from a supine position  L LE normal ROM  R knee and hip ROM limited due to pain     Strength:  L Hip MMT:   R Hip MMT:  Flexion: 4+/5  Flexion: 3 -/5    L Knee MMT:  R Knee MMT:  Flexion: 5/5  Flexion: NT/5  Extension: 5/5  Extension: NT/5    L Ankle MMT:  R Ankle MMT:  DF: 5/5  DF: 5/5  PF: 5/5   PF: 4+/5    Special Tests:  Quadrant Test: NT  Slump Test: NT  Straight Leg Raise Test: Positive on right, negative on left  Contralateral Straight Leg Raise Test: Positive on left, negative on right  Obers Test: NT     Sensation: Bilateral LE sensation intact to light touch    Assessment/Plan:   Pt presents with a diagnosis of low back pain and right leg pain and has signs and symptoms consistent with sciatica with decreased right LE ROM, decreased lumbar ROM, and right LE weakness that are limiting his ability to stand and walk.  The patient was educated in exercises to perform at home to help improve sciatica.  He was provided with a HEP handout.    Goals:   LTG 1: The patient will be independent in HEP in order to decrease pain and improve tolerance to functional activities.  STATUS: Met    Interventions:   Manual Therapy: Not performed    Therapeutic Exercises: HEP: LTR, SKTC, piriformis stretch, sciatic nerve glide, quadratus lumborum stretch, tennis ball rolling to piriformis muscle    Modalities: Not performed     Outcome Measures       Row Name 03/07/24 0854          Optimal Instrument    Optimal Instrument Optimal - 3  -LR     Standing 3  -LR     Walking - short distance 3  -LR     Walking - long distance 4  -LR     From the list, choose the 3 activities you would most like to be able to do without any difficulty Standing;Walking -short distance;Walking -long distance  -LR     Total Score Optimal - 3 10  -LR       Row Name 03/07/24 0854          Functional Assessment    Outcome Measure Options Optimal Instrument  -LR                User Key  (r) = Recorded By, (t) = Taken By, (c) = Cosigned By      Initials Name Provider Type    LR Leyla Roa, PT Physical Therapist                     Time Calculation:   PT Evaluation Complexity  History, PT Evaluation Complexity: 3 or more personal factors and/or comorbidities  Examination of Body Systems (PT Eval Complexity): 1-2 elements  Clinical Presentation (PT Evaluation Complexity): stable  Clinical Decision Making (PT Evaluation Complexity): low complexity  Overall Complexity (PT Evaluation Complexity): low complexity     PT Charges       Row Name 03/07/24 0855             Time Calculation    PT Received On 03/07/24  -LR         Time Calculation- PT    Total Timed Code Minutes- PT 22 minute(s)  -LR         Timed Charges    70215 - PT Therapeutic Exercise Minutes 8  -LR         Untimed Charges    PT Eval/Re-eval Minutes 16  -LR         Total Minutes    Timed Charges Total Minutes 8  -LR      Untimed Charges Total Minutes 16  -LR       Total Minutes 24  -LR                User Key  (r) = Recorded By, (t) = Taken By, (c) = Cosigned By      Initials Name Provider Type    LR Leyla Roa, PT Physical Therapist                  Therapy Charges for Today       Code Description Service Date Service Provider Modifiers Qty    47243589681 HC PT THER PROC EA 15 MIN 3/7/2024 Leyla Roa, PT GP 1    63602371635 HC PT EVAL LOW COMPLEXITY 2 3/7/2024 Leyla Roa, PT GP 1            PT G-Codes  Outcome Measure Options: Optimal Instrument       Leyla Roa, PT  3/7/2024

## 2024-03-18 ENCOUNTER — PATIENT OUTREACH (OUTPATIENT)
Dept: CASE MANAGEMENT | Facility: OTHER | Age: 54
End: 2024-03-18
Payer: COMMERCIAL

## 2024-03-18 DIAGNOSIS — G47.00 INSOMNIA, UNSPECIFIED TYPE: ICD-10-CM

## 2024-03-18 DIAGNOSIS — K70.31 ALCOHOLIC CIRRHOSIS OF LIVER WITH ASCITES: ICD-10-CM

## 2024-03-18 DIAGNOSIS — I50.22 CHRONIC HFREF (HEART FAILURE WITH REDUCED EJECTION FRACTION): Primary | ICD-10-CM

## 2024-03-18 DIAGNOSIS — F41.1 GENERALIZED ANXIETY DISORDER: ICD-10-CM

## 2024-03-18 NOTE — OUTREACH NOTE
AMBULATORY CASE MANAGEMENT NOTE    Name and Relationship of Patient/Support Person: Greyson Schofield L - Self    CCM Interim Update    Per chart review he had ER visit 3/7/24 for sciatica pain and was given Toradol injection and steroids with pain medication for home use. He has reminder call for PCP f/u and labs but his appointment for Friday is cancelled and rescheduled to Monday 4/15/24 with PCP. Per discussion with  PCP is off later this week.     Patient reports he is feeling much better from the ER visit and feels it was aggravated from sleeping on the couch.  He is aware of needs for labs for rechecking his thyroid levels and reports due to ack of rides he will do this next month with his follow ups.     Care Coordination    Outreached to LORENA Moncada with behavioral health to try to get his cancelled appointment with [facility] rescheduled to a Thursday or Friday next month so I can coordinate schedules for PCP and Behavioral Health same day like we did this month.     Discussed follow up with PCP in office and explained hemay have to push out to May due to Behavioral Health and he is fine with that as patient is being managed well at home and seeing specialists.     12:57 PM behavioral health was able to get him scheduled same day with PCP for May and called patient to update. He is appreciative of the coordination of scheduling due to transportation issues he has. He wrote both 5/17/24 appointments down. Care Plans updated.         Bushra CHEN  Ambulatory Case Management    3/18/2024, 12:09 EDT

## 2024-03-28 DIAGNOSIS — I50.43 ACUTE ON CHRONIC COMBINED SYSTOLIC AND DIASTOLIC CONGESTIVE HEART FAILURE: ICD-10-CM

## 2024-03-28 RX ORDER — ASPIRIN 81 MG/1
81 TABLET ORAL DAILY
Qty: 90 TABLET | Refills: 1 | Status: SHIPPED | OUTPATIENT
Start: 2024-03-28

## 2024-03-28 RX ORDER — FUROSEMIDE 40 MG/1
40 TABLET ORAL DAILY
Qty: 90 TABLET | Refills: 1 | Status: SHIPPED | OUTPATIENT
Start: 2024-03-28

## 2024-03-28 RX ORDER — LEVOTHYROXINE SODIUM 0.1 MG/1
100 TABLET ORAL EVERY MORNING
Qty: 30 TABLET | Refills: 5 | Status: SHIPPED | OUTPATIENT
Start: 2024-03-28

## 2024-03-28 RX ORDER — SPIRONOLACTONE 25 MG/1
25 TABLET ORAL DAILY
Qty: 90 TABLET | Refills: 1 | Status: SHIPPED | OUTPATIENT
Start: 2024-03-28

## 2024-03-28 RX ORDER — METOPROLOL SUCCINATE 25 MG/1
25 TABLET, EXTENDED RELEASE ORAL
Qty: 90 TABLET | Refills: 1 | Status: SHIPPED | OUTPATIENT
Start: 2024-03-28

## 2024-03-29 ENCOUNTER — PATIENT OUTREACH (OUTPATIENT)
Dept: CASE MANAGEMENT | Facility: OTHER | Age: 54
End: 2024-03-29
Payer: COMMERCIAL

## 2024-03-29 DIAGNOSIS — I50.22 CHRONIC HFREF (HEART FAILURE WITH REDUCED EJECTION FRACTION): Primary | ICD-10-CM

## 2024-03-29 DIAGNOSIS — F41.1 GENERALIZED ANXIETY DISORDER: ICD-10-CM

## 2024-03-29 DIAGNOSIS — G47.00 INSOMNIA, UNSPECIFIED TYPE: ICD-10-CM

## 2024-03-29 NOTE — OUTREACH NOTE
[facility] End of Month Documentation    This Chronic Medical Management Care Plan for Greyson Schofield, 53 y.o. male, has been monitored and managed; reviewed; revised and a new plan of care implemented for the month of March.  A cumulative time of 23  minutes was spent on this patient record this month, including face to face with provider; phone call with patient; electronic communication with other providers; chart review.    Regarding the patient's problems: has Portal hypertension; Ascites due to alcoholic cirrhosis; Nicotine dependence, chewing tobacco, w unsp disorders; Cirrhosis of liver; Chronic HFrEF (heart failure with reduced ejection fraction); Acquired hypothyroidism; Traumatic brain injury; and Generalized anxiety disorder on their problem list., the following items were addressed: medical records; medications and any changes can be found within the plan section of the note.  A detailed listing of time spent for chronic care management is tracked within each outreach encounter.  Current medications include:  has a current medication list which includes the following prescription(s): acetaminophen extra strength, aspirin adult low strength, digoxin, furosemide, hydrocodone-acetaminophen, levothyroxine, levothyroxine, meclizine, metoprolol succinate xl, mirtazapine, multivitamin, and spironolactone. and the patient is reported to be patient is compliant with medication protocol,  Medications are reported to be effective.  Regarding these diagnoses, referrals were made to the following provider(s):  Had ER visit this month.  All notes on chart for PCP to review.    The patient was monitored remotely for activity level; mood & behavior; medications; pain, had ER visit doing well.    The patient's physical needs include:  needs met.     The patient's mental support needs include:  coordination of community providers    The patient's cognitive support needs include:  needs met    The patient's psychosocial support  needs include:  continued support; coordination of community providers; medication management or adherence    The patient's functional needs include: needs met    The patient's environmental needs include:  not applicable, none    Care Plan overall comments:  reviewed and revised    Refer to previous outreach notes for more information on the areas listed above.    Monthly Billing Diagnoses  (I50.22) Chronic HFrEF (heart failure with reduced ejection fraction)    (F41.1) Generalized anxiety disorder    (G47.00) Insomnia, unspecified type    Medications   Medications have been reconciled    Care Plan progress this month:      Recently Modified Care Plans Updates made since 2/27/2024 12:00 AM       Anxiety (Adult)           Problem Priority Last Modified     Anxiety Identification (Anxiety) --  9/25/2023  2:43 PM by Bushra Coy RN              Goal Recent Progress Last Modified     Anxiety Symptoms Identified On track  3/18/2024 12:58 PM by Bushra oCy RN     Evidence-based guidance:   Assess for presence of additional co-occurring psychiatric comorbidity [e.g., substance use, other anxiety disorder (specific phobia, social anxiety disorder, panic disorder, agoraphobia, substance or medically-induced    anxiety disorder)].   Assess for presence of medical comorbidity (e.g., chronic pain, chronic illness), recent or recurrent trauma or abuse, family history of substance use disorder or mental illness.   Screen for anxiety using standardized, validated tool.   Move gradually from investigating somatic complaints to exploring social or psychologic distress.   Assess for signs and symptoms of anxiety in an atmosphere of hope and optimism.   Facilitate full diagnostic interview when positive screening results are noted; utilize DSM-5 criteria to determine appropriate diagnosis.   Screen for depression simultaneously due to the frequency of co-occurrence.    Notes:              Task Due Date Last Modified      Identify Anxiety Symptoms and Facilitate Treatment --  3/18/2024 12:59 PM by Bushra Coy RN     Care Management Activities:      - depression screen reviewed  - mental health treatment arranged  - participation in psychiatric services encouraged  - somatic and anxiety symptoms correlated      Notes: 11/1/23 continues Behavioral Health treatment and medication with YADI Tamayo and doing well    12/15 continues therapy and medication management with Kailee and doing much better with Cymbalta and Remron    2/12/24 medications managed by behavioral health and PCP and sleeping much better and anxiety is improved    3/18 doing well with Kailee and had to cancel this months f/u and resched for May                    Heart Failure (Adult)           Problem Priority Last Modified     Symptom Exacerbation (Heart Failure) --  9/25/2023  2:43 PM by Bushra Coy RN              Goal Recent Progress Last Modified     Symptom Exacerbation Prevented or Minimized On track  3/18/2024  1:02 PM by Bushra Coy RN     Evidence-based guidance:   Perform or review cognitive and/or health literacy screening.   Assess understanding of adherence and barriers to treatment plan, as well as lifestyle changes; develop strategies to address barriers.   Establish a [id number] early intervention process to communicate with primary care provider when signs/symptoms worsen.   Facilitate timely posthospital discharge or emergency department treatment that includes intensive follow-up via telephone calls, home visit, telehealth monitoring and care at multidisciplinary heart failure clinic.   Adjust frequency and intensity of follow-up based on presentation, number of emergency department visits, hospital admissions and frequency and severity of symptom exacerbation.   Facilitate timely visit, usually within 1 week, with primary care provider following hospital discharge.   Collaborate with clinical pharmacist to address  adverse drug reactions, drug interactions, subtherapeutic dosage, patient and family education.   Regularly screen for presence of depressive symptoms using a validated tool; consider pharmacologic therapy and/or referral for cognitive behavioral therapy when present.   Refer to community-based services, such as a heart failure support group, community health worker or peer support program.   Review immunization status; arrange receipt of needed vaccinations.   Prepare patient for home oxygen use based on signs/symptoms.    Notes:              Task Due Date Last Modified     Identify and Minimize Risk of Heart Failure Exacerbation --  3/18/2024  1:02 PM by Bushra Coy, JOHN     Care Management Activities:      - counseling with pharmacist provided  - medication-adherence assessment completed  - self-awareness of signs/symptoms of worsening disease encouraged      Notes: 12/15/23 stressed importance of not running out of Digoxin and explained medication effects    1/30/24 review of medications done and discussed importance of medication regimen following with cardiology reports and discussed with cardio and patient, on right track for medication management now    2/14/24 labs done and he is continuing to follow cardiology and doing well.     3/18 continues to do well                         Current Specialty Plan of Care Status signed by both patient and provider    Instructions   Patient was provided an electronic copy of care plan  CCM services were explained and offered and patient has accepted these services.  Patient has given their written consent to receive CCM services and understands that this includes the authorization of electronic communication of medical information with the other treating providers.  Patient understands that they may stop CCM services at any time and these changes will be effective at the end of the calendar month and will effectively revocate the agreement of CCM services.  Patient  understands that only one practitioner can furnish and be paid for CCM services during one calendar month.  Patient also understands that there may be co-payment or deductible fees in association with CCM services.  Patient will continue with at least monthly follow-up calls with the Ambulatory .    Bushra CHEN  Ambulatory Case Management    3/29/2024, 08:15 EDT

## 2024-03-29 NOTE — TELEPHONE ENCOUNTER
Spoke with patient, he is aware lab needs to be done and he said he will come after the Easter holiday.

## 2024-04-05 ENCOUNTER — LAB (OUTPATIENT)
Dept: LAB | Facility: HOSPITAL | Age: 54
End: 2024-04-05
Payer: COMMERCIAL

## 2024-04-05 DIAGNOSIS — E03.9 ACQUIRED HYPOTHYROIDISM: Chronic | ICD-10-CM

## 2024-04-05 LAB
T4 FREE SERPL-MCNC: 1.08 NG/DL (ref 0.92–1.68)
TSH SERPL DL<=0.05 MIU/L-ACNC: 10.89 UIU/ML (ref 0.27–4.2)

## 2024-04-05 PROCEDURE — 84443 ASSAY THYROID STIM HORMONE: CPT

## 2024-04-05 PROCEDURE — 36415 COLL VENOUS BLD VENIPUNCTURE: CPT

## 2024-04-05 PROCEDURE — 84439 ASSAY OF FREE THYROXINE: CPT

## 2024-04-08 ENCOUNTER — TELEPHONE (OUTPATIENT)
Dept: CASE MANAGEMENT | Facility: OTHER | Age: 54
End: 2024-04-08
Payer: COMMERCIAL

## 2024-04-08 ENCOUNTER — PATIENT OUTREACH (OUTPATIENT)
Dept: CASE MANAGEMENT | Facility: OTHER | Age: 54
End: 2024-04-08
Payer: COMMERCIAL

## 2024-04-08 DIAGNOSIS — I50.22 CHRONIC HFREF (HEART FAILURE WITH REDUCED EJECTION FRACTION): Primary | ICD-10-CM

## 2024-04-08 DIAGNOSIS — F41.1 GENERALIZED ANXIETY DISORDER: ICD-10-CM

## 2024-04-08 DIAGNOSIS — E03.9 ACQUIRED HYPOTHYROIDISM: Chronic | ICD-10-CM

## 2024-04-08 DIAGNOSIS — E03.9 ACQUIRED HYPOTHYROIDISM: Primary | Chronic | ICD-10-CM

## 2024-04-08 NOTE — OUTREACH NOTE
AMBULATORY CASE MANAGEMENT NOTE    Name and Relationship of Patient/Support Person: Greyson Schofield L - Self    CCM Interim Update    Reviewed labs and med list and updated sent in telephone note to PCP and patient aware to take his 125 mcg levothyroxine, reviewed his pills and he mixed the 100 and 125 mcg together and is taking the 100 mcg.     Pt verbalizes understanding and agreement of current plan of care and need for repeat labs.             Bushra CHEN  Ambulatory Case Management    4/8/2024, 15:18 EDT   Haresh bilirubin level from infants heel & sent to lab.

## 2024-04-08 NOTE — PROGRESS NOTES
Patient reports he only has the yellow pills, which is the 100 mcg, upon further review you sent the 125 mcg into [facility] in [facility] and patient reports he did get them filled but has a full bottle of the gray scored L-10 that is the 125 mg tablets and is taking the yellow 100 mcg tablets instead but will switch to the 125 mcg.

## 2024-04-08 NOTE — TELEPHONE ENCOUNTER
Sent in results notes, he is currently on 100 mcg Levothyroxine, the 125 mcg was sent to [facility] and he has a whole bottle of them and is going to switch to those, I updated med list, and per discussion with you, keep 125 mcg, remove 100 mcg from med list, repeat labs 8 weeks.    Orders pended

## 2024-04-15 ENCOUNTER — OFFICE VISIT (OUTPATIENT)
Dept: FAMILY MEDICINE CLINIC | Facility: CLINIC | Age: 54
End: 2024-04-15
Payer: COMMERCIAL

## 2024-04-15 VITALS
OXYGEN SATURATION: 96 % | TEMPERATURE: 98.4 F | HEART RATE: 91 BPM | HEIGHT: 65 IN | SYSTOLIC BLOOD PRESSURE: 122 MMHG | BODY MASS INDEX: 24.16 KG/M2 | DIASTOLIC BLOOD PRESSURE: 77 MMHG | WEIGHT: 145 LBS

## 2024-04-15 DIAGNOSIS — D64.9 ANEMIA, UNSPECIFIED TYPE: ICD-10-CM

## 2024-04-15 DIAGNOSIS — I50.22 CHRONIC HFREF (HEART FAILURE WITH REDUCED EJECTION FRACTION): Chronic | ICD-10-CM

## 2024-04-15 DIAGNOSIS — E03.9 ACQUIRED HYPOTHYROIDISM: Primary | Chronic | ICD-10-CM

## 2024-04-15 PROCEDURE — 1159F MED LIST DOCD IN RCRD: CPT | Performed by: FAMILY MEDICINE

## 2024-04-15 PROCEDURE — 1160F RVW MEDS BY RX/DR IN RCRD: CPT | Performed by: FAMILY MEDICINE

## 2024-04-15 PROCEDURE — 99214 OFFICE O/P EST MOD 30 MIN: CPT | Performed by: FAMILY MEDICINE

## 2024-04-15 NOTE — ASSESSMENT & PLAN NOTE
The patient will be continued on 125 mcg of Synthroid.  In 6 weeks he will be reevaluated via lab test and then we will decide if we are going to increase his levothyroxine more.

## 2024-04-15 NOTE — ASSESSMENT & PLAN NOTE
The patient stable today on presentation.  His most recent echo does show he has an ejection fraction of 10%.  He has his LifeVest on today.  Blood pressures today are stable and in the normal range as well as pulse.  He is continue to follow-up with cardiology.  Hopefully, by increasing his thyroid medicine he will continue to improve.

## 2024-04-15 NOTE — PROGRESS NOTES
"Chief Complaint  Labs    Subjective        Greyson Schofield presents to [facility]  History of Present Illness  He is here today to for follow-up for the management of his chronic medical conditions.  He is from [place]. He is a chronic alcoholic. He has liver cirrohsis, HFrEF with EF of 10.3%.     The patient's continue to wear a life vest and denies he has had any shocks from the LifeVest since he for started wearing it.     The patient's been having improvement in his thyroid levels now for the last 7 months.  7 months ago his TSH was 53.5 and has trended downwards to were 10 days ago it was 10.89.  He does have more energy and he is feeling better.  There was some confusion about what dosage he was post to be on.  Patient was taken to 100 mcg when he was post be on 125.  Today that was corrected and now he is on 125 mcg.     The patient has no other complaints today and denies chest pain, shortness of breath, weakness, numbness, nausea, vomiting, diarrhea, dizziness or syncopal event.        Objective   Vital Signs:  /77 (BP Location: Left arm, Patient Position: Sitting)   Pulse 91   Temp 98.4 °F (36.9 °C) (Temporal)   Ht 165.1 cm (65\")   Wt 65.8 kg (145 lb)   SpO2 96%   BMI 24.13 kg/m²   Estimated body mass index is 24.13 kg/m² as calculated from the following:    Height as of this encounter: 165.1 cm (65\").    Weight as of this encounter: 65.8 kg (145 lb).       BMI is within normal parameters. No other follow-up for BMI required.      Physical Exam  Vitals reviewed.   Constitutional:       Appearance: Normal appearance. He is well-developed.   HENT:      Head: Normocephalic and atraumatic.      Right Ear: External ear normal.      Left Ear: External ear normal.      Mouth/Throat:      Pharynx: No oropharyngeal exudate.   Eyes:      Conjunctiva/sclera: Conjunctivae normal.      Pupils: Pupils are equal, round, and reactive to light.   Neck:      Vascular: No " carotid bruit.   Cardiovascular:      Rate and Rhythm: Normal rate and regular rhythm.      Heart sounds: No murmur heard.     No friction rub. No gallop.   Pulmonary:      Effort: Pulmonary effort is normal.      Breath sounds: Normal breath sounds. No wheezing or rhonchi.   Abdominal:      General: There is no distension.   Skin:     General: Skin is warm and dry.   Neurological:      Mental Status: He is alert and oriented to person, place, and time.      Cranial Nerves: No cranial nerve deficit.      Motor: No weakness.   Psychiatric:         Mood and Affect: Mood and affect normal.         Behavior: Behavior normal.         Thought Content: Thought content normal.         Judgment: Judgment normal.        Result Review :      CMP          10/14/2023    10:50 11/13/2023    12:13 11/21/2023    12:13   CMP   Glucose 94  83  98    BUN 17  16  21    Creatinine 0.77  1.07  1.39    EGFR 108.4  84.0  61.4    Sodium 135  134  132    Potassium 4.0  4.4  3.9    Chloride 100  96  94    Calcium 9.4  10.2  9.9    Total Protein 7.5   8.4    Albumin 3.9   3.9    Globulin 3.6   4.5    Total Bilirubin 0.2   0.3    Alkaline Phosphatase 133   144    AST (SGOT) 16   22    ALT (SGPT) 13   25    Albumin/Globulin Ratio 1.1   0.9    BUN/Creatinine Ratio 22.1  15.0  15.1    Anion Gap 8.3  11.3  11.6      CBC          10/14/2023    10:50 11/21/2023    12:13 12/5/2023    08:17   CBC   WBC 10.32  7.93  6.69       RBC 2.69  4.26  3.60       Hemoglobin 8.7  12.8  10.8       Hematocrit 26.7  39.6  33.4       MCV 99.3  93.0  93       MCH 32.3  30.0  30.0       MCHC 32.6  32.3  32.3       RDW 13.6  14.5  14.9       Platelets 319  472  351          Details          This result is from an external source.             Lipid Panel          8/22/2023    04:38 8/26/2023    04:45   Lipid Panel   Total Cholesterol 128  162    Triglycerides 60  64    HDL Cholesterol 57  76    VLDL Cholesterol 13  13    LDL Cholesterol  58  73    LDL/HDL Ratio 1.04  0.96       TSH          8/27/2023    04:45 11/13/2023    12:13 4/5/2024    13:42   TSH   TSH 53.500  28.000  10.890                   Assessment and Plan     Diagnoses and all orders for this visit:    1. Acquired hypothyroidism (Primary)  Assessment & Plan:  The patient will be continued on 125 mcg of Synthroid.  In 6 weeks he will be reevaluated via lab test and then we will decide if we are going to increase his levothyroxine more.      2. Anemia, unspecified type  Comments:  Pt's anemia appears to be stable. Lab orders were placed today to evaluate for B12, folate, iron and ferritin levels all to be managed according findings  Orders:  -     CBC w AUTO Differential; Future  -     Iron and TIBC; Future  -     Ferritin; Future  -     Vitamin B12; Future  -     Folate; Future    3. Chronic HFrEF (heart failure with reduced ejection fraction)  Assessment & Plan:  The patient stable today on presentation.  His most recent echo does show he has an ejection fraction of 10%.  He has his LifeVest on today.  Blood pressures today are stable and in the normal range as well as pulse.  He is continue to follow-up with cardiology.  Hopefully, by increasing his thyroid medicine he will continue to improve.               Follow Up     Return in about 3 months (around 7/15/2024).  Patient was given instructions and counseling regarding his condition or for health maintenance advice. Please see specific information pulled into the AVS if appropriate.

## 2024-04-30 ENCOUNTER — TELEPHONE (OUTPATIENT)
Dept: CASE MANAGEMENT | Facility: OTHER | Age: 54
End: 2024-04-30
Payer: COMMERCIAL

## 2024-04-30 ENCOUNTER — PATIENT OUTREACH (OUTPATIENT)
Dept: CASE MANAGEMENT | Facility: OTHER | Age: 54
End: 2024-04-30
Payer: COMMERCIAL

## 2024-04-30 DIAGNOSIS — E03.9 ACQUIRED HYPOTHYROIDISM: Primary | ICD-10-CM

## 2024-04-30 DIAGNOSIS — I50.22 CHRONIC HFREF (HEART FAILURE WITH REDUCED EJECTION FRACTION): ICD-10-CM

## 2024-04-30 DIAGNOSIS — F41.1 GENERALIZED ANXIETY DISORDER: ICD-10-CM

## 2024-04-30 DIAGNOSIS — K70.31 ALCOHOLIC CIRRHOSIS OF LIVER WITH ASCITES: ICD-10-CM

## 2024-04-30 NOTE — OUTREACH NOTE
[facility] End of Month Documentation    This Chronic Medical Management Care Plan for Greyson Schofield, 54 y.o. male, has been monitored and managed; reviewed; revised and a new plan of care implemented for the month of April.  A cumulative time of 26  minutes was spent on this patient record this month, including phone call with patient; face to face with provider; chart review.    Regarding the patient's problems: has Portal hypertension; Ascites due to alcoholic cirrhosis; Nicotine dependence, chewing tobacco, w unsp disorders; Cirrhosis of liver; Chronic HFrEF (heart failure with reduced ejection fraction); Acquired hypothyroidism; Traumatic brain injury; and Generalized anxiety disorder on their problem list., the following items were addressed: medical records; medications and any changes can be found within the plan section of the note.  A detailed listing of time spent for chronic care management is tracked within each outreach encounter.  Current medications include:  has a current medication list which includes the following prescription(s): acetaminophen extra strength, aspirin adult low strength, digoxin, furosemide, hydrocodone-acetaminophen, levothyroxine, metoprolol succinate xl, multivitamin, and spironolactone. and the patient is reported to be patient is compliant with medication protocol,  Medications are reported to be effective.      The patient was monitored remotely for activity level; mood & behavior.    The patient's physical needs include:  needs met.     The patient's mental support needs include:  needs met    The patient's cognitive support needs include:  needs met    The patient's psychosocial support needs include:  continued support; coordination of community providers; medication management or adherence    The patient's functional needs include: needs met    The patient's environmental needs include:  not applicable, none    Care Plan overall comments:  reviewed and revised    Refer to  previous outreach notes for more information on the areas listed above.    Monthly Billing Diagnoses  (E03.9) Acquired hypothyroidism    (I50.22) Chronic HFrEF (heart failure with reduced ejection fraction)    (F41.1) Generalized anxiety disorder    (K70.31) Alcoholic cirrhosis of liver with ascites    Medications   Medications have been reconciled    Care Plan progress this month:      Recently Modified Care Plans Updates made since 3/30/2024 12:00 AM      No recently modified care plans.            Current Specialty Plan of Care Status signed by both patient and provider    Instructions   Patient was provided an electronic copy of care plan  CCM services were explained and offered and patient has accepted these services.  Patient has given their written consent to receive CCM services and understands that this includes the authorization of electronic communication of medical information with the other treating providers.  Patient understands that they may stop CCM services at any time and these changes will be effective at the end of the calendar month and will effectively revocate the agreement of CCM services.  Patient understands that only one practitioner can furnish and be paid for CCM services during one calendar month.  Patient also understands that there may be co-payment or deductible fees in association with CCM services.  Patient will continue with at least monthly follow-up calls with the Ambulatory .    Bushra CHEN  Ambulatory Case Management    4/30/2024, 15:18 EDT

## 2024-05-06 ENCOUNTER — TELEPHONE (OUTPATIENT)
Dept: CASE MANAGEMENT | Facility: OTHER | Age: 54
End: 2024-05-06
Payer: COMMERCIAL

## 2024-05-17 ENCOUNTER — TRANSCRIBE ORDERS (OUTPATIENT)
Dept: ADMINISTRATIVE | Facility: HOSPITAL | Age: 54
End: 2024-05-17
Payer: COMMERCIAL

## 2024-05-17 DIAGNOSIS — F10.10 MILD ALCOHOL USE DISORDER: ICD-10-CM

## 2024-05-17 DIAGNOSIS — I42.0 DILATED CARDIOMYOPATHY: ICD-10-CM

## 2024-05-17 DIAGNOSIS — K74.69 OTHER CIRRHOSIS OF LIVER: Primary | ICD-10-CM

## 2024-05-21 ENCOUNTER — LAB (OUTPATIENT)
Dept: LAB | Facility: HOSPITAL | Age: 54
End: 2024-05-21
Payer: COMMERCIAL

## 2024-05-21 ENCOUNTER — OFFICE VISIT (OUTPATIENT)
Dept: FAMILY MEDICINE CLINIC | Facility: CLINIC | Age: 54
End: 2024-05-21
Payer: COMMERCIAL

## 2024-05-21 VITALS
WEIGHT: 141.2 LBS | SYSTOLIC BLOOD PRESSURE: 114 MMHG | HEIGHT: 65 IN | BODY MASS INDEX: 23.53 KG/M2 | HEART RATE: 83 BPM | DIASTOLIC BLOOD PRESSURE: 79 MMHG | OXYGEN SATURATION: 96 %

## 2024-05-21 DIAGNOSIS — K21.9 GASTROESOPHAGEAL REFLUX DISEASE WITHOUT ESOPHAGITIS: ICD-10-CM

## 2024-05-21 DIAGNOSIS — E03.9 ACQUIRED HYPOTHYROIDISM: Chronic | ICD-10-CM

## 2024-05-21 DIAGNOSIS — I50.22 CHRONIC HFREF (HEART FAILURE WITH REDUCED EJECTION FRACTION): Chronic | ICD-10-CM

## 2024-05-21 DIAGNOSIS — E03.9 ACQUIRED HYPOTHYROIDISM: Primary | Chronic | ICD-10-CM

## 2024-05-21 DIAGNOSIS — F41.1 GENERALIZED ANXIETY DISORDER: Chronic | ICD-10-CM

## 2024-05-21 DIAGNOSIS — D64.9 ANEMIA, UNSPECIFIED TYPE: ICD-10-CM

## 2024-05-21 LAB
BASOPHILS # BLD AUTO: 0.09 10*3/MM3 (ref 0–0.2)
BASOPHILS NFR BLD AUTO: 1.2 % (ref 0–1.5)
DEPRECATED RDW RBC AUTO: 54.5 FL (ref 37–54)
EOSINOPHIL # BLD AUTO: 0.09 10*3/MM3 (ref 0–0.4)
EOSINOPHIL NFR BLD AUTO: 1.2 % (ref 0.3–6.2)
ERYTHROCYTE [DISTWIDTH] IN BLOOD BY AUTOMATED COUNT: 15.4 % (ref 12.3–15.4)
FERRITIN SERPL-MCNC: 106 NG/ML (ref 30–400)
FOLATE SERPL-MCNC: 9.78 NG/ML (ref 4.78–24.2)
HCT VFR BLD AUTO: 50.4 % (ref 37.5–51)
HGB BLD-MCNC: 16.8 G/DL (ref 13–17.7)
IMM GRANULOCYTES # BLD AUTO: 0.03 10*3/MM3 (ref 0–0.05)
IMM GRANULOCYTES NFR BLD AUTO: 0.4 % (ref 0–0.5)
IRON 24H UR-MRATE: 106 MCG/DL (ref 59–158)
IRON SATN MFR SERPL: 25 % (ref 20–50)
LYMPHOCYTES # BLD AUTO: 2.53 10*3/MM3 (ref 0.7–3.1)
LYMPHOCYTES NFR BLD AUTO: 33.3 % (ref 19.6–45.3)
MCH RBC QN AUTO: 31.8 PG (ref 26.6–33)
MCHC RBC AUTO-ENTMCNC: 33.3 G/DL (ref 31.5–35.7)
MCV RBC AUTO: 95.5 FL (ref 79–97)
MONOCYTES # BLD AUTO: 0.65 10*3/MM3 (ref 0.1–0.9)
MONOCYTES NFR BLD AUTO: 8.6 % (ref 5–12)
NEUTROPHILS NFR BLD AUTO: 4.21 10*3/MM3 (ref 1.7–7)
NEUTROPHILS NFR BLD AUTO: 55.3 % (ref 42.7–76)
NRBC BLD AUTO-RTO: 0 /100 WBC (ref 0–0.2)
PLATELET # BLD AUTO: 418 10*3/MM3 (ref 140–450)
PMV BLD AUTO: 8.5 FL (ref 6–12)
RBC # BLD AUTO: 5.28 10*6/MM3 (ref 4.14–5.8)
T4 FREE SERPL-MCNC: 1.39 NG/DL (ref 0.93–1.7)
TIBC SERPL-MCNC: 416 MCG/DL (ref 298–536)
TRANSFERRIN SERPL-MCNC: 279 MG/DL (ref 200–360)
TSH SERPL DL<=0.05 MIU/L-ACNC: 21.8 UIU/ML (ref 0.27–4.2)
VIT B12 BLD-MCNC: 404 PG/ML (ref 211–946)
WBC NRBC COR # BLD AUTO: 7.6 10*3/MM3 (ref 3.4–10.8)

## 2024-05-21 PROCEDURE — 84443 ASSAY THYROID STIM HORMONE: CPT

## 2024-05-21 PROCEDURE — 84439 ASSAY OF FREE THYROXINE: CPT

## 2024-05-21 PROCEDURE — 1159F MED LIST DOCD IN RCRD: CPT | Performed by: FAMILY MEDICINE

## 2024-05-21 PROCEDURE — 84466 ASSAY OF TRANSFERRIN: CPT

## 2024-05-21 PROCEDURE — 1126F AMNT PAIN NOTED NONE PRSNT: CPT | Performed by: FAMILY MEDICINE

## 2024-05-21 PROCEDURE — 82746 ASSAY OF FOLIC ACID SERUM: CPT

## 2024-05-21 PROCEDURE — 36415 COLL VENOUS BLD VENIPUNCTURE: CPT

## 2024-05-21 PROCEDURE — 99214 OFFICE O/P EST MOD 30 MIN: CPT | Performed by: FAMILY MEDICINE

## 2024-05-21 PROCEDURE — 85025 COMPLETE CBC W/AUTO DIFF WBC: CPT

## 2024-05-21 PROCEDURE — 82728 ASSAY OF FERRITIN: CPT

## 2024-05-21 PROCEDURE — 1160F RVW MEDS BY RX/DR IN RCRD: CPT | Performed by: FAMILY MEDICINE

## 2024-05-21 PROCEDURE — 82607 VITAMIN B-12: CPT

## 2024-05-21 PROCEDURE — 83540 ASSAY OF IRON: CPT

## 2024-05-21 RX ORDER — OMEPRAZOLE 40 MG/1
40 CAPSULE, DELAYED RELEASE ORAL DAILY
Qty: 30 CAPSULE | Refills: 5 | Status: SHIPPED | OUTPATIENT
Start: 2024-05-21

## 2024-05-21 NOTE — PROGRESS NOTES
"Chief Complaint  Hypothyroidism    Subjective        Greyson Schofield presents to [facility]  History of Present Illness  He is here today to for follow-up for the management of his chronic medical conditions.  He is from [place]. He is a chronic alcoholic. He has liver cirrohsis, HFrEF with EF of 10.3%.     The patient's continue to wear a life vest and denies he has had any shocks from the LifeVest since he for started wearing it.     The patient's been having improvement in his thyroid levels now for the last 7 months.  7 months ago his TSH was 53.5 and has trended downwards to were 10 days ago it was 10.89.  He does have more energy and he is feeling better.  There was some confusion about what dosage he was post to be on.      Today he is taking 125 mcg thyroid replacement. He is having some GI upset from time to time and has lost 4 lbs.      The patient has no other complaints today and denies chest pain, shortness of breath, weakness, numbness, nausea, vomiting, diarrhea, dizziness or syncopal event.        Objective   Vital Signs:  /79 (BP Location: Left arm, Patient Position: Sitting, Cuff Size: Adult)   Pulse 83   Ht 165.1 cm (65\")   Wt 64 kg (141 lb 3.2 oz)   SpO2 96%   BMI 23.50 kg/m²   Estimated body mass index is 23.5 kg/m² as calculated from the following:    Height as of this encounter: 165.1 cm (65\").    Weight as of this encounter: 64 kg (141 lb 3.2 oz).       BMI is within normal parameters. No other follow-up for BMI required.      Physical Exam  Vitals reviewed.   Constitutional:       Appearance: Normal appearance. He is well-developed.   HENT:      Head: Normocephalic and atraumatic.      Right Ear: External ear normal.      Left Ear: External ear normal.      Mouth/Throat:      Pharynx: No oropharyngeal exudate.   Eyes:      Conjunctiva/sclera: Conjunctivae normal.      Pupils: Pupils are equal, round, and reactive to light.   Neck:      " Vascular: No carotid bruit.   Cardiovascular:      Rate and Rhythm: Normal rate and regular rhythm.      Heart sounds: No murmur heard.     No friction rub. No gallop.   Pulmonary:      Effort: Pulmonary effort is normal.      Breath sounds: Normal breath sounds. No wheezing or rhonchi.   Abdominal:      General: There is no distension.   Skin:     General: Skin is warm and dry.   Neurological:      Mental Status: He is alert and oriented to person, place, and time.      Cranial Nerves: No cranial nerve deficit.      Motor: No weakness.   Psychiatric:         Mood and Affect: Mood and affect normal.         Behavior: Behavior normal.         Thought Content: Thought content normal.         Judgment: Judgment normal.        Result Review :      CMP          10/14/2023    10:50 11/13/2023    12:13 11/21/2023    12:13   CMP   Glucose 94  83  98    BUN 17  16  21    Creatinine 0.77  1.07  1.39    EGFR 108.4  84.0  61.4    Sodium 135  134  132    Potassium 4.0  4.4  3.9    Chloride 100  96  94    Calcium 9.4  10.2  9.9    Total Protein 7.5   8.4    Albumin 3.9   3.9    Globulin 3.6   4.5    Total Bilirubin 0.2   0.3    Alkaline Phosphatase 133   144    AST (SGOT) 16   22    ALT (SGPT) 13   25    Albumin/Globulin Ratio 1.1   0.9    BUN/Creatinine Ratio 22.1  15.0  15.1    Anion Gap 8.3  11.3  11.6      CBC          11/21/2023    12:13 12/5/2023    08:17 5/21/2024    14:55   CBC   WBC 7.93  6.69     7.60    RBC 4.26  3.60     5.28    Hemoglobin 12.8  10.8     16.8    Hematocrit 39.6  33.4     50.4    MCV 93.0  93     95.5    MCH 30.0  30.0     31.8    MCHC 32.3  32.3     33.3    RDW 14.5  14.9     15.4    Platelets 472  351     418       Details          This result is from an external source.             Lipid Panel          8/22/2023    04:38 8/26/2023    04:45   Lipid Panel   Total Cholesterol 128  162    Triglycerides 60  64    HDL Cholesterol 57  76    VLDL Cholesterol 13  13    LDL Cholesterol  58  73    LDL/HDL  Ratio 1.04  0.96      TSH          8/27/2023    04:45 11/13/2023    12:13 4/5/2024    13:42   TSH   TSH 53.500  28.000  10.890                   Assessment and Plan     Diagnoses and all orders for this visit:    1. Acquired hypothyroidism (Primary)  Comments:  He is currently taking 125 mcg daily. His labs were ordered today be managed according to findings.    2. Generalized anxiety disorder  Assessment & Plan:  Psychological condition is stable.  Continue current treatment regimen.  Psychological condition  will be reassessed in 6 months.      3. Gastroesophageal reflux disease without esophagitis  -     omeprazole (priLOSEC) 40 MG capsule; Take 1 capsule by mouth Daily.  Dispense: 30 capsule; Refill: 5    4. Chronic HFrEF (heart failure with reduced ejection fraction)  Assessment & Plan:  His heart is strengthening slightly and he is still doing cardiac rehab.               Follow Up     Return in about 6 weeks (around 7/2/2024).  Patient was given instructions and counseling regarding his condition or for health maintenance advice. Please see specific information pulled into the AVS if appropriate.

## 2024-05-28 ENCOUNTER — TELEPHONE (OUTPATIENT)
Dept: CASE MANAGEMENT | Facility: OTHER | Age: 54
End: 2024-05-28
Payer: COMMERCIAL

## 2024-05-28 ENCOUNTER — PATIENT OUTREACH (OUTPATIENT)
Dept: CASE MANAGEMENT | Facility: OTHER | Age: 54
End: 2024-05-28
Payer: COMMERCIAL

## 2024-05-28 DIAGNOSIS — E03.9 ACQUIRED HYPOTHYROIDISM: Primary | ICD-10-CM

## 2024-05-28 DIAGNOSIS — I50.22 CHRONIC HFREF (HEART FAILURE WITH REDUCED EJECTION FRACTION): ICD-10-CM

## 2024-05-28 DIAGNOSIS — F41.1 GENERALIZED ANXIETY DISORDER: ICD-10-CM

## 2024-05-28 NOTE — TELEPHONE ENCOUNTER
Chart reviewed for Ccm and noted he did make his PCP follow up last week and had labs done. PCP report his Free T4 is better but not high as he would like and his TSH remains high and asking if patient is taking Biotin as it can cause higher readings and per call today from office staff he is not taking any Biotin or any vitamins at all. Waiting response from PCP.

## 2024-05-28 NOTE — TELEPHONE ENCOUNTER
Did not see any Behavioral Health follow up and he is seeing PCP for 6 week follow up Tuesday 7/9/24 will see if there is a Thursday or Friday solt that week to see if they could coordinate a follow up with Kailee for Behavioral Health tat day as well as he normally sees both due to rides.

## 2024-05-28 NOTE — OUTREACH NOTE
AMBULATORY CASE MANAGEMENT NOTE    Names and Relationships of Patient/Support Persons: Caller: Greyson Schofield; Relationship: Self  Caller: Coral MONTAÑO MA with Behavioral Health; Relationship: Provider -     [facility] Interim Update    Per chart review labs were done last week, see telephone note in chart today.    Attempted to reach patient about upcoming 6 weeks follow up with PCP to try to coordinate this with a Behavioral Health appointment at office in [place] with Kailee as he missed last visit with her due to transportation. I was going to change his PCP f/u to a Thursday or Friday to get Behavioral Health and PCP same day same office. Coral has a few openings both days. Was able to reach patient on his phone but he kept breaking up and could not hear to have a conversation with him. I called from office and work cell.     Updated plan of care as it was lapsed as well.           Bushra CHEN  Ambulatory Case Management    5/28/2024, 11:52 EDT

## 2024-05-31 ENCOUNTER — PATIENT OUTREACH (OUTPATIENT)
Dept: CASE MANAGEMENT | Facility: OTHER | Age: 54
End: 2024-05-31
Payer: COMMERCIAL

## 2024-05-31 DIAGNOSIS — K70.31 ALCOHOLIC CIRRHOSIS OF LIVER WITH ASCITES: ICD-10-CM

## 2024-05-31 DIAGNOSIS — I50.22 CHRONIC HFREF (HEART FAILURE WITH REDUCED EJECTION FRACTION): Primary | ICD-10-CM

## 2024-05-31 DIAGNOSIS — E03.9 ACQUIRED HYPOTHYROIDISM: ICD-10-CM

## 2024-05-31 NOTE — OUTREACH NOTE
[facility] End of Month Documentation    This Chronic Medical Management Care Plan for Greyson Schofield, 54 y.o. male, has been monitored and managed; reviewed and a new plan of care implemented for the month of May.  A cumulative time of 21  minutes was spent on this patient record this month, including phone call with patient; chart review.    Regarding the patient's problems: has Portal hypertension; Ascites due to alcoholic cirrhosis; Nicotine dependence, chewing tobacco, w unsp disorders; Cirrhosis of liver; Chronic HFrEF (heart failure with reduced ejection fraction); Acquired hypothyroidism; Traumatic brain injury; and Generalized anxiety disorder on their problem list., the following items were addressed: medical records; medications and any changes can be found within the plan section of the note.  A detailed listing of time spent for chronic care management is tracked within each outreach encounter.  Current medications include:  has a current medication list which includes the following prescription(s): acetaminophen extra strength, aspirin adult low strength, digoxin, furosemide, hydrocodone-acetaminophen, levothyroxine, metoprolol succinate xl, multivitamin, omeprazole, and spironolactone. and the patient is reported to be patient is compliant with medication protocol,  Medications are reported to be effective.      The patient was monitored remotely for activity level; mood & behavior; medications.    The patient's physical needs include:  needs met.     The patient's mental support needs include:  continued support; coordination of community providers    The patient's cognitive support needs include:  needs met    The patient's psychosocial support needs include:  continued support; coordination of community providers; medication management or adherence    The patient's functional needs include: needs met    The patient's environmental needs include:  not applicable, none    Care Plan overall comments:   reviewed    Refer to previous outreach notes for more information on the areas listed above.    Monthly Billing Diagnoses  (I50.22) Chronic HFrEF (heart failure with reduced ejection fraction)    (K70.31) Alcoholic cirrhosis of liver with ascites    (E03.9) Acquired hypothyroidism    Medications   Medications have been reconciled    Care Plan progress this month:      Recently Modified Care Plans Updates made since 4/30/2024 12:00 AM      No recently modified care plans.            Current Specialty Plan of Care Status signed by both patient and provider    Instructions   Patient was provided an electronic copy of care plan  CCM services were explained and offered and patient has accepted these services.  Patient has given their written consent to receive CCM services and understands that this includes the authorization of electronic communication of medical information with the other treating providers.  Patient understands that they may stop CCM services at any time and these changes will be effective at the end of the calendar month and will effectively revocate the agreement of CCM services.  Patient understands that only one practitioner can furnish and be paid for CCM services during one calendar month.  Patient also understands that there may be co-payment or deductible fees in association with CCM services.  Patient will continue with at least monthly follow-up calls with the Ambulatory .    Bushra CHEN  Ambulatory Case Management    5/31/2024, 13:44 EDT

## 2024-06-17 ENCOUNTER — TRANSCRIBE ORDERS (OUTPATIENT)
Dept: ADMINISTRATIVE | Facility: HOSPITAL | Age: 54
End: 2024-06-17
Payer: COMMERCIAL

## 2024-06-17 DIAGNOSIS — I42.0 CONGESTIVE CARDIOMYOPATHY: Primary | ICD-10-CM

## 2024-06-17 DIAGNOSIS — I50.20 HEART FAILURE WITH REDUCED LEFT VENTRICULAR FUNCTION: ICD-10-CM

## 2024-06-25 ENCOUNTER — PATIENT OUTREACH (OUTPATIENT)
Dept: CASE MANAGEMENT | Facility: OTHER | Age: 54
End: 2024-06-25
Payer: COMMERCIAL

## 2024-06-25 DIAGNOSIS — E03.9 ACQUIRED HYPOTHYROIDISM: ICD-10-CM

## 2024-06-25 DIAGNOSIS — K70.31 ALCOHOLIC CIRRHOSIS OF LIVER WITH ASCITES: ICD-10-CM

## 2024-06-25 DIAGNOSIS — I50.22 CHRONIC HFREF (HEART FAILURE WITH REDUCED EJECTION FRACTION): Primary | ICD-10-CM

## 2024-06-25 NOTE — OUTREACH NOTE
"AMBULATORY CASE MANAGEMENT NOTE    Names and Relationships of Patient/Support Persons: Caller: Greyson Schofield; Relationship: Self  Caller: Greyson Schofield; Relationship: Self -     [facility] Interim Update    Per chart review he saw Dr. Li 6/13/24 and assessment and plan as follows for [facility] cardio:      Assessment/Plan  Chronic systolic heart failure  Nonischemic cardiomyopathy  Cirrhosis  Extensive alcohol use history, now quit since 7/2023  Anxiety disorder  Depression  Hypothyroidism    The patient is currently NYHA class II, on physical examination  Heart failure medications including:  Increase Toprol to 50 mg daily, 100 mg in 2-4 weeks  Lasix 40 mg p.o. daily  Dig 250 mcg po daily  Aldactone 25 mg po daily  Discontinue Farxiga, Entresto, Verquvo due to low blood pressure  Symptoms are very much better after he has stopped these medications.  BP improved, repeat limited TTE, if IF still low, will discuss with Dr. Galicia regarding ICD placement  Continue Synthroid  Continue follow-up with psychiatry  Resumed alcohol, advised abstinence once again.  RTC in 3 months with TTE/BMP/BNP prior    Miguel Li MD  [facility] CARDIOLOG[facility]  6/13/2024 10:58 AM      Electronically signed by Miguel Li MD at 06/13/2024 11:10 AM EDT     Discussed with patient and encouraged him to get back in with Kailee for Behavioral Health appointment. Last visit with Kailee was 2/9/24 and cancelled his 3/22/24 and 5/17/24 appointments with her and no upcoming visits scheduled. He reports he has the number and will call to get back in as they are not liking he is drinking again. I asked who \"they\" he is referring to and he reports the cardiology and liver doctors.     He has follow up with PCP 7/9/24 and ECHO at [facility] for 8/1/24 and is aware of those. He reports he is taking his increased dose of Synthroid at 137 mcg now.     Denies needs for me at this time. He reports he was not wearing his vest but cardiology encouraged he " needs to wear it especially while doing his 1-3 mile walks so they can see what is going on with his heart then.       Education Documentation  drug/alcohol use, taught by Bushra Coy RN at 6/25/2024  3:35 PM.  Learner: Patient  Readiness: Acceptance  Method: Explanation  Response: Verbalizes Understanding, Needs Reinforcement  Comment: started to drink alcohol again          Bushra H  Ambulatory Case Management    6/25/2024, 15:21 EDT

## 2024-08-01 ENCOUNTER — HOSPITAL ENCOUNTER (OUTPATIENT)
Dept: CARDIOLOGY | Facility: HOSPITAL | Age: 54
Discharge: HOME | End: 2024-08-01
Admitting: INTERNAL MEDICINE
Payer: COMMERCIAL

## 2024-08-01 DIAGNOSIS — I42.0 CONGESTIVE CARDIOMYOPATHY: ICD-10-CM

## 2024-08-01 DIAGNOSIS — I50.20 HEART FAILURE WITH REDUCED LEFT VENTRICULAR FUNCTION: ICD-10-CM

## 2024-08-01 LAB
AORTIC DIMENSIONLESS INDEX: 1.14 (DI)
ASCENDING AORTA: 2.9 CM
BH CV ECHO MEAS - ACS: 2.5 CM
BH CV ECHO MEAS - AI P1/2T: 629.8 MSEC
BH CV ECHO MEAS - AO MAX PG: 4.6 MMHG
BH CV ECHO MEAS - AO MEAN PG: 3 MMHG
BH CV ECHO MEAS - AO ROOT DIAM: 3.5 CM
BH CV ECHO MEAS - AO V2 MAX: 107 CM/SEC
BH CV ECHO MEAS - AO V2 VTI: 14.3 CM
BH CV ECHO MEAS - AVA(I,D): 3.7 CM2
BH CV ECHO MEAS - EDV(CUBED): 140.6 ML
BH CV ECHO MEAS - EDV(MOD-SP2): 64.2 ML
BH CV ECHO MEAS - EDV(MOD-SP4): 76.2 ML
BH CV ECHO MEAS - EF(MOD-BP): 43.5 %
BH CV ECHO MEAS - EF(MOD-SP2): 44.1 %
BH CV ECHO MEAS - EF(MOD-SP4): 44.9 %
BH CV ECHO MEAS - ESV(CUBED): 46.7 ML
BH CV ECHO MEAS - ESV(MOD-SP2): 35.9 ML
BH CV ECHO MEAS - ESV(MOD-SP4): 42 ML
BH CV ECHO MEAS - FS: 30.8 %
BH CV ECHO MEAS - IVS/LVPW: 0.89 CM
BH CV ECHO MEAS - IVSD: 0.8 CM
BH CV ECHO MEAS - LA DIMENSION: 2.9 CM
BH CV ECHO MEAS - LAT PEAK E' VEL: 7 CM/SEC
BH CV ECHO MEAS - LV DIASTOLIC VOL/BSA (35-75): 44.7 CM2
BH CV ECHO MEAS - LV MASS(C)D: 156.9 GRAMS
BH CV ECHO MEAS - LV MAX PG: 2.6 MMHG
BH CV ECHO MEAS - LV MEAN PG: 1 MMHG
BH CV ECHO MEAS - LV SYSTOLIC VOL/BSA (12-30): 24.6 CM2
BH CV ECHO MEAS - LV V1 MAX: 80.3 CM/SEC
BH CV ECHO MEAS - LV V1 VTI: 15.1 CM
BH CV ECHO MEAS - LVIDD: 5.2 CM
BH CV ECHO MEAS - LVIDS: 3.6 CM
BH CV ECHO MEAS - LVOT AREA: 3.5 CM2
BH CV ECHO MEAS - LVOT DIAM: 2.1 CM
BH CV ECHO MEAS - LVPWD: 0.9 CM
BH CV ECHO MEAS - MED PEAK E' VEL: 5.1 CM/SEC
BH CV ECHO MEAS - MV A MAX VEL: 61.3 CM/SEC
BH CV ECHO MEAS - MV DEC SLOPE: 496 CM/SEC2
BH CV ECHO MEAS - MV DEC TIME: 0.34 SEC
BH CV ECHO MEAS - MV E MAX VEL: 37.7 CM/SEC
BH CV ECHO MEAS - MV E/A: 0.62
BH CV ECHO MEAS - MV MEAN PG: 1 MMHG
BH CV ECHO MEAS - MV P1/2T: 40.4 MSEC
BH CV ECHO MEAS - MV V2 VTI: 21.8 CM
BH CV ECHO MEAS - MVA(P1/2T): 5.4 CM2
BH CV ECHO MEAS - MVA(VTI): 2.4 CM2
BH CV ECHO MEAS - PA V2 MAX: 77 CM/SEC
BH CV ECHO MEAS - PI END-D VEL: 130 CM/SEC
BH CV ECHO MEAS - PULM A REVS DUR: 0.15 SEC
BH CV ECHO MEAS - PULM A REVS VEL: 88.5 CM/SEC
BH CV ECHO MEAS - PULM DIAS VEL: 39.2 CM/SEC
BH CV ECHO MEAS - PULM S/D: 1.47
BH CV ECHO MEAS - PULM SYS VEL: 57.8 CM/SEC
BH CV ECHO MEAS - QP/QS: 0.56
BH CV ECHO MEAS - RV MAX PG: 0.86 MMHG
BH CV ECHO MEAS - RV V1 MAX: 46.3 CM/SEC
BH CV ECHO MEAS - RV V1 VTI: 8.5 CM
BH CV ECHO MEAS - RVDD: 3 CM
BH CV ECHO MEAS - RVOT DIAM: 2.1 CM
BH CV ECHO MEAS - SV(LVOT): 52.3 ML
BH CV ECHO MEAS - SV(MOD-SP2): 28.3 ML
BH CV ECHO MEAS - SV(MOD-SP4): 34.2 ML
BH CV ECHO MEAS - SV(RVOT): 29.3 ML
BH CV ECHO MEAS - SVI(LVOT): 30.7 ML/M2
BH CV ECHO MEAS - SVI(MOD-SP2): 16.6 ML/M2
BH CV ECHO MEAS - SVI(MOD-SP4): 20.1 ML/M2
BH CV ECHO MEAS - TAPSE (>1.6): 1.27 CM
BH CV ECHO MEASUREMENTS AVERAGE E/E' RATIO: 6.23
BH CV XLRA - TDI S': 11.7 CM/SEC
IVRT: 63 MS
LEFT ATRIUM VOLUME INDEX: 15.3 ML/M2

## 2024-08-01 PROCEDURE — 93306 TTE W/DOPPLER COMPLETE: CPT

## 2024-08-26 ENCOUNTER — TELEPHONE (OUTPATIENT)
Dept: CASE MANAGEMENT | Facility: OTHER | Age: 54
End: 2024-08-26
Payer: COMMERCIAL

## 2024-08-26 NOTE — TELEPHONE ENCOUNTER
"Chart review for CCM outreach and he \"no show\" last PCP follow up last month and has nothing scheduled, he had echocardiogram done for [facility] doctors 8/1/24 and has a follow up with [facility] 8/28/24 will outreach after this appointment to see changes.   "

## 2024-08-29 ENCOUNTER — PATIENT OUTREACH (OUTPATIENT)
Dept: CASE MANAGEMENT | Facility: OTHER | Age: 54
End: 2024-08-29
Payer: COMMERCIAL

## 2024-08-29 DIAGNOSIS — K70.31 ALCOHOLIC CIRRHOSIS OF LIVER WITH ASCITES: ICD-10-CM

## 2024-08-29 DIAGNOSIS — E03.9 ACQUIRED HYPOTHYROIDISM: ICD-10-CM

## 2024-08-29 DIAGNOSIS — I50.22 CHRONIC HFREF (HEART FAILURE WITH REDUCED EJECTION FRACTION): Primary | ICD-10-CM

## 2024-08-29 PROCEDURE — G0511 CCM/BHI BY RHC/FQHC 20MIN MO: HCPCS | Performed by: FAMILY MEDICINE

## 2024-08-29 NOTE — OUTREACH NOTE
AMBULATORY CASE MANAGEMENT NOTE    Names and Relationships of Patient/Support Persons: Contact: Greyson Schofield BELIA; Relationship: Self -     CCM Interim Update    Noted per chart review he saw Dr. De La Cruz at [facility] yesterday. Most recent EF is 41-45% up from 10% and no need for life vest or the procedure at this point. He still has not seen PCP for follow up and needs repeat thyroid labs.     Called patient he is reporting he is walking 5 miles 3 days a week now and with that and his medication the cardiology team reports it is making his heart stronger. He is so excited to no longer have to do the life vest or have the surgery. He is aware of labs needed and f/u with PCP and reports he had to cancel due to having 2 appointments on same day.     I got him rescheduled next week to see PCP with support from office staff and he is aware of follow up and labs for Thursday 9/5/24 at 9:45 am and he wrote his appointment on his calendar. Reviewed care plan and goals met for CHF and discussed CCM monitoring. He reports he would like to stay in the program at this time to have someone to keep an eye on everything. I explained we do health care goals and once they are reached, patients graduate. We agreed to monitor for his TSH levels at next week follow up and go from there on his CCM program.     Adult Patient Profile  Questions/Answers      Flowsheet Row Most Recent Value   Symptoms/Conditions Managed at Home endocrine   Cardiovascular Symptoms/Conditions heart failure   Cardiovascular Management Strategies activity, medication therapy, medical device   Cardiovascular Self-Management Outcome 4 (good)   Cardiovascular Comment he had follow up with [facility] for implantable defibrillator and per OV notes his EF has gone from 10% to 45% and he no longer needs this surgery and he no longer needs to wear his life vest. He is to continue medications as is.   Endocrine Symptoms/Conditions thyroid disorder   Endocrine Management  Strategies medication therapy   Endocrine Self-Management Outcome 3 (uncertain)   Endocrine Comment needs epeat labs after increasing Synthroid to 137 mcg and missed f/u, sees PCP next week.              Education Documentation  drug/alcohol use, taught by Bushra Coy, RN at 8/29/2024  2:16 PM.  Learner: Patient  Readiness: Acceptance  Method: Explanation  Response: Verbalizes Understanding  Comment: needs f/u and labs    preventive care, taught by Bushra Coy RN at 8/29/2024  2:16 PM.  Learner: Patient  Readiness: Acceptance  Method: Explanation  Response: Verbalizes Understanding  Comment: needs f/u and labs          Bushra H  Ambulatory Case Management    8/29/2024, 13:43 EDT

## 2024-08-30 ENCOUNTER — PATIENT OUTREACH (OUTPATIENT)
Dept: CASE MANAGEMENT | Facility: OTHER | Age: 54
End: 2024-08-30
Payer: COMMERCIAL

## 2024-08-30 DIAGNOSIS — F41.1 GENERALIZED ANXIETY DISORDER: ICD-10-CM

## 2024-08-30 DIAGNOSIS — K70.31 ALCOHOLIC CIRRHOSIS OF LIVER WITH ASCITES: ICD-10-CM

## 2024-08-30 DIAGNOSIS — I50.22 CHRONIC HFREF (HEART FAILURE WITH REDUCED EJECTION FRACTION): Primary | ICD-10-CM

## 2024-08-30 NOTE — OUTREACH NOTE
[facility] End of Month Documentation    This Chronic Medical Management Care Plan for Greyson Schofield, 54 y.o. male, has been monitored and managed; reviewed; complete; established and a new plan of care implemented for the month of August.  A cumulative time of 27  minutes was spent on this patient record this month, including phone call with patient; chart review; electronic communication with other providers.    Regarding the patient's problems: has Portal hypertension; Ascites due to alcoholic cirrhosis; Nicotine dependence, chewing tobacco, w unsp disorders; Cirrhosis of liver; Chronic HFrEF (heart failure with reduced ejection fraction); Acquired hypothyroidism; Traumatic brain injury; and Generalized anxiety disorder on their problem list., the following items were addressed: medical records; medications and any changes can be found within the plan section of the note.  A detailed listing of time spent for chronic care management is tracked within each outreach encounter.  Current medications include:  has a current medication list which includes the following prescription(s): acetaminophen extra strength, aspirin adult low strength, digoxin, furosemide, hydrocodone-acetaminophen, levothyroxine, metoprolol succinate xl, multivitamin, omeprazole, and spironolactone. and the patient is reported to be patient is compliant with medication protocol,  Medications are reported to be effective, labs pending for thyroid medication next month.  Regarding these diagnoses, referrals were made to the following provider(s):  Follow up with cardiology with [facility].  All notes on chart for PCP to review.    The patient was monitored remotely for activity level; mood & behavior; medications.    The patient's physical needs include:  needs met.     The patient's mental support needs include:  coordination of community providers    The patient's cognitive support needs include:  needs met    The patient's psychosocial support needs  include:  medication management or adherence; continued support    The patient's functional needs include: needs met    The patient's environmental needs include:  not applicable, none    Care Plan overall comments:  completed heart failure and added new for thyroid    Refer to previous outreach notes for more information on the areas listed above.    Monthly Billing Diagnoses  (I50.22) Chronic HFrEF (heart failure with reduced ejection fraction)    (K70.31) Alcoholic cirrhosis of liver with ascites    (F41.1) Generalized anxiety disorder    Medications   Medications have been reconciled    Care Plan progress this month:      Recently Modified Care Plans Updates made since 7/30/2024 12:00 AM       Heart Failure (Adult)           Problem Priority Last Modified     Symptom Exacerbation (Heart Failure) --  8/29/2024  2:23 PM by Bushra Coy RN              Goal Recent Progress Last Modified     Symptom Exacerbation Prevented or Minimized --  8/29/2024  2:23 PM by Bushra Coy RN     Evidence-based guidance:   Perform or review cognitive and/or health literacy screening.   Assess understanding of adherence and barriers to treatment plan, as well as lifestyle changes; develop strategies to address barriers.   Establish a yiredhrn-dpuheg-ehic early intervention process to communicate with primary care provider when signs/symptoms worsen.   Facilitate timely posthospital discharge or emergency department treatment that includes intensive follow-up via telephone calls, home visit, telehealth monitoring and care at multidisciplinary heart failure clinic.   Adjust frequency and intensity of follow-up based on presentation, number of emergency department visits, hospital admissions and frequency and severity of symptom exacerbation.   Facilitate timely visit, usually within 1 week, with primary care provider following hospital discharge.   Collaborate with clinical pharmacist to address adverse drug reactions, drug  interactions, subtherapeutic dosage, patient and family education.   Regularly screen for presence of depressive symptoms using a validated tool; consider pharmacologic therapy and/or referral for cognitive behavioral therapy when present.   Refer to community-based services, such as a heart failure support group, community health worker or peer support program.   Review immunization status; arrange receipt of needed vaccinations.   Prepare patient for home oxygen use based on signs/symptoms.    Notes:              Task Due Date Last Modified     Identify and Minimize Risk of Heart Failure Exacerbation --  8/29/2024  2:23 PM by Bushra Coy, JOHN     Care Management Activities:      - counseling with pharmacist provided  - medication-adherence assessment completed  - self-awareness of signs/symptoms of worsening disease encouraged      Notes: 12/15/23 stressed importance of not running out of Digoxin and explained medication effects    1/30/24 review of medications done and discussed importance of medication regimen following with cardiology reports and discussed with cardio and patient, on right track for medication management now    2/14/24 labs done and he is continuing to follow cardiology and doing well.     3/18 continues to do well    6/25/24 plan to put in monitoring and see what his 81/24 ECHO shows and close as long as all is well.     8/29/24 cardiology f/u yesterday EF is up to 45% and no need for defibrillator or life vest any longer!!!!                    General Plan of Care (Adult)           Problem Priority Last Modified     Medication Adherence (Wellness) --  8/29/2024  2:22 PM by Bushra Coy, JOHN              Goal Recent Progress Last Modified     Medication Adherence Maintained --  8/29/2024  2:23 PM by Bushra Coy, RN     Evidence-based guidance:   Develop a complete and accurate medication list including those prescribed and over-the-counter, those taken only occasionally and those not  taken by mouth such as injections, inhalers, ointments or creams and drops.   Review all medications to determine if patient or caregiver knows why the medications are given and if taken as prescribed.   Complete or review a medication adherence assessment including barriers to medication adherence.   Arrange and encourage counseling and medication review by pharmacist.   Assess barriers to medication adherence.   Manage poor understanding or health literacy by using easy to understand language, teach-back, visual aids and teaching only 2 or 3 points at a time.   Assess presence of side effects; provide suggestions to manage or reduce side effects.   Consult with provider and/or pharmacist regarding substitute medication, changing dose, simplification of regimen or safe discontinuation of some medications.   Encourage the use of medication reminders such as clock or cell phone alarm, color coding, pillboxes for am/pm and days of the week, pharmacy refill reminder, auto-refill system or mail-order option.   Assist with resources when cost is a barrier; refer to prescription assistance programs; confirm that generics are prescribed whenever possible; consider 90-day prescriptions to reduce copay cost; synchronize refills.   Provide help to complete medication assistance applications or health insurance forms as needed.   Complete a follow-up call 2 to 3 weeks after medication self-management plan developed; assess adherence and understanding, as well as listen to patient or caregiver concerns; amend plan as needed.   Provide frequent follow-up providing motivation, encouragement and support when medication nonadherence is identified.    Notes:              Task Due Date Last Modified     Optimize Medication Use --  8/29/2024  2:23 PM by Bushra Coy RN     Care Management Activities:      - barriers to medication adherence identified  - medication-adherence assessment completed      Notes:                           Current Specialty Plan of Care Status signed by both patient and provider    Instructions   Patient was provided an electronic copy of care plan  CCM services were explained and offered and patient has accepted these services.  Patient has given their written consent to receive CCM services and understands that this includes the authorization of electronic communication of medical information with the other treating providers.  Patient understands that they may stop CCM services at any time and these changes will be effective at the end of the calendar month and will effectively revocate the agreement of CCM services.  Patient understands that only one practitioner can furnish and be paid for CCM services during one calendar month.  Patient also understands that there may be co-payment or deductible fees in association with CCM services.  Patient will continue with at least monthly follow-up calls with the Ambulatory .    Bushra CHEN  Ambulatory Case Management    8/30/2024, 12:09 EDT

## 2024-09-05 ENCOUNTER — OFFICE VISIT (OUTPATIENT)
Dept: FAMILY MEDICINE CLINIC | Facility: CLINIC | Age: 54
End: 2024-09-05
Payer: COMMERCIAL

## 2024-09-05 ENCOUNTER — LAB (OUTPATIENT)
Dept: LAB | Facility: HOSPITAL | Age: 54
End: 2024-09-05
Payer: COMMERCIAL

## 2024-09-05 VITALS
BODY MASS INDEX: 23.46 KG/M2 | DIASTOLIC BLOOD PRESSURE: 75 MMHG | HEIGHT: 65 IN | SYSTOLIC BLOOD PRESSURE: 119 MMHG | RESPIRATION RATE: 18 BRPM | TEMPERATURE: 98 F | OXYGEN SATURATION: 100 % | WEIGHT: 140.8 LBS | HEART RATE: 80 BPM

## 2024-09-05 DIAGNOSIS — F17.219 CIGARETTE NICOTINE DEPENDENCE WITH NICOTINE-INDUCED DISORDER: ICD-10-CM

## 2024-09-05 DIAGNOSIS — E03.9 ACQUIRED HYPOTHYROIDISM: Chronic | ICD-10-CM

## 2024-09-05 DIAGNOSIS — Z00.00 ANNUAL PHYSICAL EXAM: ICD-10-CM

## 2024-09-05 DIAGNOSIS — E03.9 ACQUIRED HYPOTHYROIDISM: Primary | Chronic | ICD-10-CM

## 2024-09-05 DIAGNOSIS — I50.22 CHRONIC HFREF (HEART FAILURE WITH REDUCED EJECTION FRACTION): Chronic | ICD-10-CM

## 2024-09-05 PROBLEM — F17.229: Status: RESOLVED | Noted: 2023-09-05 | Resolved: 2024-09-05

## 2024-09-05 PROBLEM — S06.9XAA TRAUMATIC BRAIN INJURY: Chronic | Status: ACTIVE | Noted: 2023-10-27

## 2024-09-05 LAB
ALBUMIN SERPL-MCNC: 4.5 G/DL (ref 3.5–5.2)
ALBUMIN/GLOB SERPL: 1.3 G/DL
ALP SERPL-CCNC: 127 U/L (ref 39–117)
ALT SERPL W P-5'-P-CCNC: 20 U/L (ref 1–41)
ANION GAP SERPL CALCULATED.3IONS-SCNC: 10.4 MMOL/L (ref 5–15)
AST SERPL-CCNC: 31 U/L (ref 1–40)
BACTERIA UR QL AUTO: NORMAL /HPF
BASOPHILS # BLD AUTO: 0.1 10*3/MM3 (ref 0–0.2)
BASOPHILS NFR BLD AUTO: 1.3 % (ref 0–1.5)
BILIRUB SERPL-MCNC: 0.4 MG/DL (ref 0–1.2)
BILIRUB UR QL STRIP: NEGATIVE
BUN SERPL-MCNC: 13 MG/DL (ref 6–20)
BUN/CREAT SERPL: 14.3 (ref 7–25)
CALCIUM SPEC-SCNC: 9.7 MG/DL (ref 8.6–10.5)
CHLORIDE SERPL-SCNC: 96 MMOL/L (ref 98–107)
CLARITY UR: CLEAR
CO2 SERPL-SCNC: 30.6 MMOL/L (ref 22–29)
COLOR UR: YELLOW
CREAT SERPL-MCNC: 0.91 MG/DL (ref 0.76–1.27)
DEPRECATED RDW RBC AUTO: 46.5 FL (ref 37–54)
EGFRCR SERPLBLD CKD-EPI 2021: 101.4 ML/MIN/1.73
EOSINOPHIL # BLD AUTO: 0.29 10*3/MM3 (ref 0–0.4)
EOSINOPHIL NFR BLD AUTO: 3.9 % (ref 0.3–6.2)
ERYTHROCYTE [DISTWIDTH] IN BLOOD BY AUTOMATED COUNT: 13.2 % (ref 12.3–15.4)
GLOBULIN UR ELPH-MCNC: 3.4 GM/DL
GLUCOSE SERPL-MCNC: 95 MG/DL (ref 65–99)
GLUCOSE UR STRIP-MCNC: NEGATIVE MG/DL
HCT VFR BLD AUTO: 41.4 % (ref 37.5–51)
HGB BLD-MCNC: 14.2 G/DL (ref 13–17.7)
HGB UR QL STRIP.AUTO: NEGATIVE
HYALINE CASTS UR QL AUTO: NORMAL /LPF
IMM GRANULOCYTES # BLD AUTO: 0.03 10*3/MM3 (ref 0–0.05)
IMM GRANULOCYTES NFR BLD AUTO: 0.4 % (ref 0–0.5)
KETONES UR QL STRIP: NEGATIVE
LEUKOCYTE ESTERASE UR QL STRIP.AUTO: NEGATIVE
LYMPHOCYTES # BLD AUTO: 2.04 10*3/MM3 (ref 0.7–3.1)
LYMPHOCYTES NFR BLD AUTO: 27.3 % (ref 19.6–45.3)
MCH RBC QN AUTO: 33.4 PG (ref 26.6–33)
MCHC RBC AUTO-ENTMCNC: 34.3 G/DL (ref 31.5–35.7)
MCV RBC AUTO: 97.4 FL (ref 79–97)
MONOCYTES # BLD AUTO: 0.87 10*3/MM3 (ref 0.1–0.9)
MONOCYTES NFR BLD AUTO: 11.6 % (ref 5–12)
NEUTROPHILS NFR BLD AUTO: 4.14 10*3/MM3 (ref 1.7–7)
NEUTROPHILS NFR BLD AUTO: 55.5 % (ref 42.7–76)
NITRITE UR QL STRIP: NEGATIVE
NRBC BLD AUTO-RTO: 0 /100 WBC (ref 0–0.2)
PH UR STRIP.AUTO: 6.5 [PH] (ref 5–8)
PLATELET # BLD AUTO: 384 10*3/MM3 (ref 140–450)
PMV BLD AUTO: 8.7 FL (ref 6–12)
POTASSIUM SERPL-SCNC: 3.8 MMOL/L (ref 3.5–5.2)
PROT SERPL-MCNC: 7.9 G/DL (ref 6–8.5)
PROT UR QL STRIP: NEGATIVE
RBC # BLD AUTO: 4.25 10*6/MM3 (ref 4.14–5.8)
RBC # UR STRIP: NORMAL /HPF
REF LAB TEST METHOD: NORMAL
SODIUM SERPL-SCNC: 137 MMOL/L (ref 136–145)
SP GR UR STRIP: 1.01 (ref 1–1.03)
SQUAMOUS #/AREA URNS HPF: NORMAL /HPF
T4 FREE SERPL-MCNC: 1.27 NG/DL (ref 0.92–1.68)
TSH SERPL DL<=0.05 MIU/L-ACNC: 12.9 UIU/ML (ref 0.27–4.2)
UROBILINOGEN UR QL STRIP: NORMAL
WBC # UR STRIP: NORMAL /HPF
WBC NRBC COR # BLD AUTO: 7.47 10*3/MM3 (ref 3.4–10.8)

## 2024-09-05 PROCEDURE — 99214 OFFICE O/P EST MOD 30 MIN: CPT | Performed by: FAMILY MEDICINE

## 2024-09-05 PROCEDURE — 36415 COLL VENOUS BLD VENIPUNCTURE: CPT

## 2024-09-05 PROCEDURE — 1160F RVW MEDS BY RX/DR IN RCRD: CPT | Performed by: FAMILY MEDICINE

## 2024-09-05 PROCEDURE — 84439 ASSAY OF FREE THYROXINE: CPT

## 2024-09-05 PROCEDURE — 81001 URINALYSIS AUTO W/SCOPE: CPT

## 2024-09-05 PROCEDURE — 1126F AMNT PAIN NOTED NONE PRSNT: CPT | Performed by: FAMILY MEDICINE

## 2024-09-05 PROCEDURE — 84443 ASSAY THYROID STIM HORMONE: CPT

## 2024-09-05 PROCEDURE — 1159F MED LIST DOCD IN RCRD: CPT | Performed by: FAMILY MEDICINE

## 2024-09-05 PROCEDURE — 85025 COMPLETE CBC W/AUTO DIFF WBC: CPT

## 2024-09-05 PROCEDURE — 80053 COMPREHEN METABOLIC PANEL: CPT

## 2024-09-05 NOTE — PROGRESS NOTES
"Chief Complaint  Heart Problem and Nicotine Dependence (Smokes .50pk daily.)    Subjective        Greyson Schofield presents to [facility]  History of Present Illness  He is here today to for follow-up for the management of his chronic medical conditions.  He is from [place]. He is a chronic alcoholic. He has liver cirrohsis, nicotine dependence, HFrEF with EF of 41-45%.     The patient's smoking about a half a pack cigarettes a day.     Today he is taking 137 mcg thyroid replacement.    He is walking about 5 miles a day.  His ejection fraction has improved from 10% up to 41 to 45%.  He is feeling great today.     The patient has no other complaints today and denies chest pain, shortness of breath, weakness, numbness, nausea, vomiting, diarrhea, dizziness or syncopal event.               :                                Objective   Vital Signs:  /75 (BP Location: Left arm, Patient Position: Sitting, Cuff Size: Adult)   Pulse 80   Temp 98 °F (36.7 °C) (Temporal)   Resp 18   Ht 165.1 cm (65\")   Wt 63.9 kg (140 lb 12.8 oz)   SpO2 100%   BMI 23.43 kg/m²   Estimated body mass index is 23.43 kg/m² as calculated from the following:    Height as of this encounter: 165.1 cm (65\").    Weight as of this encounter: 63.9 kg (140 lb 12.8 oz).    BMI is within normal parameters. No other follow-up for BMI required.      Physical Exam  Vitals reviewed.   Constitutional:       Appearance: Normal appearance. He is well-developed.   HENT:      Head: Normocephalic and atraumatic.      Right Ear: External ear normal.      Left Ear: External ear normal.      Mouth/Throat:      Pharynx: No oropharyngeal exudate.   Eyes:      Conjunctiva/sclera: Conjunctivae normal.      Pupils: Pupils are equal, round, and reactive to light.   Neck:      Vascular: No carotid bruit.   Cardiovascular:      Rate and Rhythm: Normal rate and regular rhythm.      Heart sounds: No murmur heard.     No friction " rub. No gallop.   Pulmonary:      Effort: Pulmonary effort is normal.      Breath sounds: Normal breath sounds. No wheezing or rhonchi.   Abdominal:      General: There is no distension.   Skin:     General: Skin is warm and dry.   Neurological:      Mental Status: He is alert and oriented to person, place, and time.      Cranial Nerves: No cranial nerve deficit.      Motor: No weakness.   Psychiatric:         Mood and Affect: Mood and affect normal.         Behavior: Behavior normal.         Thought Content: Thought content normal.         Judgment: Judgment normal.        Result Review :    CMP          10/14/2023    10:50 11/13/2023    12:13 11/21/2023    12:13   CMP   Glucose 94  83  98    BUN 17  16  21    Creatinine 0.77  1.07  1.39    EGFR 108.4  84.0  61.4    Sodium 135  134  132    Potassium 4.0  4.4  3.9    Chloride 100  96  94    Calcium 9.4  10.2  9.9    Total Protein 7.5   8.4    Albumin 3.9   3.9    Globulin 3.6   4.5    Total Bilirubin 0.2   0.3    Alkaline Phosphatase 133   144    AST (SGOT) 16   22    ALT (SGPT) 13   25    Albumin/Globulin Ratio 1.1   0.9    BUN/Creatinine Ratio 22.1  15.0  15.1    Anion Gap 8.3  11.3  11.6      CBC          11/21/2023    12:13 12/5/2023    08:17 5/21/2024    14:55   CBC   WBC 7.93  6.69     7.60    RBC 4.26  3.60     5.28    Hemoglobin 12.8  10.8     16.8    Hematocrit 39.6  33.4     50.4    MCV 93.0  93     95.5    MCH 30.0  30.0     31.8    MCHC 32.3  32.3     33.3    RDW 14.5  14.9     15.4    Platelets 472  351     418       Details          This result is from an external source.               TSH          11/13/2023    12:13 4/5/2024    13:42 5/21/2024    14:54   TSH   TSH 28.000  10.890  21.800                Assessment and Plan   Diagnoses and all orders for this visit:    1. Acquired hypothyroidism (Primary)  Comments:  The patient was given a repeat thyroid level today.  We will evaluate his thyroid level and adjust if needed.  Continued on the 137 mcg  daily.  Orders:  -     TSH+Free T4; Future  -     Comprehensive metabolic panel; Future    2. Annual physical exam  -     CBC w AUTO Differential; Future  -     Urinalysis With Microscopic - Urine, Clean Catch; Future    3. Chronic HFrEF (heart failure with reduced ejection fraction)  Assessment & Plan:  The patient's ejection fraction is up to 41 to 45%.  He is increased from class III to class I heart failure.  He was encouraged to stop smoking today.      4. Cigarette nicotine dependence with nicotine-induced disorder  Assessment & Plan:  Tobacco use is worsening.  Smoking cessation counseling was provided.  Tobacco use will be reassessed in 6 months.                                                     Follow Up   Return in about 6 months (around 3/5/2025).  Patient was given instructions and counseling regarding his condition or for health maintenance advice. Please see specific information pulled into the AVS if appropriate.

## 2024-09-05 NOTE — ASSESSMENT & PLAN NOTE
The patient's ejection fraction is up to 41 to 45%.  He is increased from class III to class I heart failure.  He was encouraged to stop smoking today.

## 2024-09-05 NOTE — ASSESSMENT & PLAN NOTE
Tobacco use is worsening.  Smoking cessation counseling was provided.  Tobacco use will be reassessed in 6 months.

## 2024-09-06 ENCOUNTER — TELEPHONE (OUTPATIENT)
Dept: CASE MANAGEMENT | Facility: OTHER | Age: 54
End: 2024-09-06
Payer: COMMERCIAL

## 2024-09-06 NOTE — TELEPHONE ENCOUNTER
Pt had follow up with PCP yesterday, labs done and results back but not reviewed. May have to increase his Synthroid but TSH is improving and free T4 is WNL. He is to see PCP again in 6 months. Will do follow up call once labs are reviewed and called back as he will probably need repeat labs in 8 weeks.

## 2024-09-08 DIAGNOSIS — E03.9 ACQUIRED HYPOTHYROIDISM: Primary | Chronic | ICD-10-CM

## 2024-09-08 RX ORDER — LEVOTHYROXINE SODIUM 150 UG/1
150 TABLET ORAL DAILY
Qty: 30 TABLET | Refills: 2 | Status: SHIPPED | OUTPATIENT
Start: 2024-09-08

## 2024-09-12 ENCOUNTER — TELEPHONE (OUTPATIENT)
Dept: CASE MANAGEMENT | Facility: OTHER | Age: 54
End: 2024-09-12
Payer: COMMERCIAL

## 2024-09-12 NOTE — TELEPHONE ENCOUNTER
Chart review done and noted he had labs called back from office taff and plan is that PCP increased his Levothyroxine to 150 mcg from 137 mcg and will have follow up labs on this in 3 months.

## 2024-09-23 RX ORDER — SPIRONOLACTONE 25 MG/1
25 TABLET ORAL DAILY
Qty: 90 TABLET | Refills: 1 | Status: SHIPPED | OUTPATIENT
Start: 2024-09-23

## 2024-09-23 RX ORDER — ASPIRIN 81 MG/1
81 TABLET, COATED ORAL DAILY
Qty: 90 TABLET | Refills: 1 | Status: SHIPPED | OUTPATIENT
Start: 2024-09-23

## 2024-09-23 RX ORDER — FUROSEMIDE 40 MG
40 TABLET ORAL DAILY
Qty: 90 TABLET | Refills: 1 | Status: SHIPPED | OUTPATIENT
Start: 2024-09-23

## 2024-09-26 ENCOUNTER — PATIENT OUTREACH (OUTPATIENT)
Dept: CASE MANAGEMENT | Facility: OTHER | Age: 54
End: 2024-09-26
Payer: COMMERCIAL

## 2024-09-26 DIAGNOSIS — F41.1 GENERALIZED ANXIETY DISORDER: ICD-10-CM

## 2024-09-26 DIAGNOSIS — K70.31 ALCOHOLIC CIRRHOSIS OF LIVER WITH ASCITES: ICD-10-CM

## 2024-09-26 DIAGNOSIS — I50.22 CHRONIC HFREF (HEART FAILURE WITH REDUCED EJECTION FRACTION): Primary | ICD-10-CM

## 2024-09-30 ENCOUNTER — PATIENT OUTREACH (OUTPATIENT)
Dept: CASE MANAGEMENT | Facility: OTHER | Age: 54
End: 2024-09-30
Payer: COMMERCIAL

## 2024-09-30 DIAGNOSIS — E03.9 ACQUIRED HYPOTHYROIDISM: ICD-10-CM

## 2024-09-30 DIAGNOSIS — I50.22 CHRONIC HFREF (HEART FAILURE WITH REDUCED EJECTION FRACTION): Primary | ICD-10-CM

## 2024-09-30 DIAGNOSIS — F41.1 GENERALIZED ANXIETY DISORDER: ICD-10-CM

## 2024-09-30 DIAGNOSIS — K70.31 ALCOHOLIC CIRRHOSIS OF LIVER WITH ASCITES: ICD-10-CM

## 2024-09-30 NOTE — OUTREACH NOTE
[facility] End of Month Documentation    This Chronic Medical Management Care Plan for Greyson Schofield, 54 y.o. male, has been monitored and managed; reviewed; complete and a new plan of care implemented for the month of September.  A cumulative time of 25  minutes was spent on this patient record this month, including phone call with patient; chart review.    Regarding the patient's problems: has Portal hypertension; Ascites due to alcoholic cirrhosis; Cigarette nicotine dependence with nicotine-induced disorder; Cirrhosis of liver; Chronic HFrEF (heart failure with reduced ejection fraction); Acquired hypothyroidism; Traumatic brain injury; and Generalized anxiety disorder on their problem list., the following items were addressed: medical records; medications; changes to medical care and any changes can be found within the plan section of the note.  A detailed listing of time spent for chronic care management is tracked within each outreach encounter.  Current medications include:  has a current medication list which includes the following prescription(s): acetaminophen extra strength, aspirin low dose, digoxin, furosemide, levothyroxine, levothyroxine, metoprolol succinate xl, multivitamin, omeprazole, and spironolactone. and the patient is reported to be patient is compliant with medication protocol,  Medications are reported to be effective.  Regarding these diagnoses, referrals were made to the following provider(s):  [facility] cardiology follow up this month.  All notes on chart for PCP to review.    The patient was monitored remotely for weight; activity level; mood & behavior; medications.    The patient's physical needs include:  needs met.     The patient's mental support needs include:  continued support    The patient's cognitive support needs include:  not applicable    The patient's psychosocial support needs include:  medication management or adherence; continued support    The patient's functional needs  include: needs met    The patient's environmental needs include:  not applicable, none    Care Plan overall comments:  completed and moved to monitoring    Refer to previous outreach notes for more information on the areas listed above.    Monthly Billing Diagnoses  (I50.22) Chronic HFrEF (heart failure with reduced ejection fraction)    (K70.31) Alcoholic cirrhosis of liver with ascites    (F41.1) Generalized anxiety disorder    (E03.9) Acquired hypothyroidism    Medications   Medications have been reconciled    Care Plan progress this month:      Recently Modified Care Plans Updates made since 8/30/2024 12:00 AM       General Plan of Care (Adult)           Problem Priority Last Modified     Medication Adherence (Wellness) --  8/29/2024  2:22 PM by Bushra Coy RN              Goal Recent Progress Last Modified     Medication Adherence Maintained --  9/26/2024  3:44 PM by Bushra Coy RN     Evidence-based guidance:   Develop a complete and accurate medication list including those prescribed and over-the-counter, those taken only occasionally and those not taken by mouth such as injections, inhalers, ointments or creams and drops.   Review all medications to determine if patient or caregiver knows why the medications are given and if taken as prescribed.   Complete or review a medication adherence assessment including barriers to medication adherence.   Arrange and encourage counseling and medication review by pharmacist.   Assess barriers to medication adherence.   Manage poor understanding or health literacy by using easy to understand language, teach-back, visual aids and teaching only 2 or 3 points at a time.   Assess presence of side effects; provide suggestions to manage or reduce side effects.   Consult with provider and/or pharmacist regarding substitute medication, changing dose, simplification of regimen or safe discontinuation of some medications.   Encourage the use of medication reminders such as  clock or cell phone alarm, color coding, pillboxes for am/pm and days of the week, pharmacy refill reminder, auto-refill system or mail-order option.   Assist with resources when cost is a barrier; refer to prescription assistance programs; confirm that generics are prescribed whenever possible; consider 90-day prescriptions to reduce copay cost; synchronize refills.   Provide help to complete medication assistance applications or health insurance forms as needed.   Complete a follow-up call 2 to 3 weeks after medication self-management plan developed; assess adherence and understanding, as well as listen to patient or caregiver concerns; amend plan as needed.   Provide frequent follow-up providing motivation, encouragement and support when medication nonadherence is identified.    Notes:              Task Due Date Last Modified     Optimize Medication Use --  9/26/2024  3:44 PM by Bushra Coy RN     Care Management Activities:      - barriers to medication adherence identified  - medication-adherence assessment completed      Notes:                          Current Specialty Plan of Care Status signed by both patient and provider    Instructions   Patient was provided an electronic copy of care plan  CCM services were explained and offered and patient has accepted these services.  Patient has given their written consent to receive CCM services and understands that this includes the authorization of electronic communication of medical information with the other treating providers.  Patient understands that they may stop CCM services at any time and these changes will be effective at the end of the calendar month and will effectively revocate the agreement of CCM services.  Patient understands that only one practitioner can furnish and be paid for CCM services during one calendar month.  Patient also understands that there may be co-payment or deductible fees in association with CCM services.  Patient will  continue with at least monthly follow-up calls with the Ambulatory .    Bushra CHEN  Ambulatory Case Management    9/30/2024, 10:12 EDT

## 2024-10-02 ENCOUNTER — PATIENT OUTREACH (OUTPATIENT)
Dept: CASE MANAGEMENT | Facility: OTHER | Age: 54
End: 2024-10-02
Payer: COMMERCIAL

## 2024-10-02 ENCOUNTER — DOCUMENTATION (OUTPATIENT)
Dept: FAMILY MEDICINE CLINIC | Facility: CLINIC | Age: 54
End: 2024-10-02
Payer: COMMERCIAL

## 2024-10-02 DIAGNOSIS — I50.22 CHRONIC HFREF (HEART FAILURE WITH REDUCED EJECTION FRACTION): Primary | ICD-10-CM

## 2024-10-02 DIAGNOSIS — K70.31 ALCOHOLIC CIRRHOSIS OF LIVER WITH ASCITES: ICD-10-CM

## 2024-10-02 NOTE — OUTREACH NOTE
AMBULATORY CASE MANAGEMENT NOTE    Names and Relationships of Patient/Support Persons: Contact: Greyson Schofield; Relationship: Self -     CCM Interim Update    I had received a secure chat message from [place]  Lois that patient had gotten transferred to that office and she told him I would call him back, this was 12:38 pm.     I was able to return call to him but I had not reached out to him today. He reports he had a missed call from the number he called back but no message was left on his phone. I reviewed his chart for any note or scheduling or lab results and nothing noted. He does have an ultrasound scheduled at [facility] for 10/28/24.     There was an outreach started by YADI Daugherty today at 12:50 pm but nothing charted and she is not his PCP, I sent a secure chat to her to see if she may have needed anything for or from him.     Patient just wanted to be sure he was not missing something or an appointment or anything.     Education Documentation  No documentation found.        Bushra CHEN  Ambulatory Case Management    10/2/2024, 17:09 EDT

## 2024-10-23 DIAGNOSIS — K21.9 GASTROESOPHAGEAL REFLUX DISEASE WITHOUT ESOPHAGITIS: ICD-10-CM

## 2024-10-23 RX ORDER — OMEPRAZOLE 40 MG/1
40 CAPSULE, DELAYED RELEASE ORAL DAILY
Qty: 30 CAPSULE | Refills: 5 | Status: SHIPPED | OUTPATIENT
Start: 2024-10-23

## 2024-10-24 ENCOUNTER — TELEPHONE (OUTPATIENT)
Dept: CASE MANAGEMENT | Facility: OTHER | Age: 54
End: 2024-10-24
Payer: COMMERCIAL

## 2024-10-28 ENCOUNTER — HOSPITAL ENCOUNTER (OUTPATIENT)
Dept: ULTRASOUND IMAGING | Facility: HOSPITAL | Age: 54
Discharge: HOME | End: 2024-10-28
Admitting: INTERNAL MEDICINE
Payer: COMMERCIAL

## 2024-10-28 DIAGNOSIS — K74.69 OTHER CIRRHOSIS OF LIVER: ICD-10-CM

## 2024-10-28 DIAGNOSIS — I42.0 DILATED CARDIOMYOPATHY: ICD-10-CM

## 2024-10-28 DIAGNOSIS — F10.10 MILD ALCOHOL USE DISORDER: ICD-10-CM

## 2024-10-28 PROCEDURE — 76705 ECHO EXAM OF ABDOMEN: CPT

## 2024-11-21 ENCOUNTER — TELEPHONE (OUTPATIENT)
Dept: CASE MANAGEMENT | Facility: OTHER | Age: 54
End: 2024-11-21
Payer: COMMERCIAL

## 2024-11-21 NOTE — TELEPHONE ENCOUNTER
CCM chart review, no ER or hospital visits noted since last check. No visits for any providers noted. He needs a 6 month follow up for PCP for around 3/5/25. Will discuss at next outreach.

## 2024-11-22 RX ORDER — LEVOTHYROXINE SODIUM 150 UG/1
150 TABLET ORAL DAILY
Qty: 30 TABLET | Refills: 2 | Status: SHIPPED | OUTPATIENT
Start: 2024-11-22

## 2024-12-17 ENCOUNTER — TELEPHONE (OUTPATIENT)
Dept: CASE MANAGEMENT | Facility: OTHER | Age: 54
End: 2024-12-17
Payer: COMMERCIAL

## 2024-12-17 NOTE — TELEPHONE ENCOUNTER
CCM monitoring chart review completed. No appointments and no visits this month. Needs labs done for his thyroid.

## 2024-12-19 ENCOUNTER — PATIENT OUTREACH (OUTPATIENT)
Dept: CASE MANAGEMENT | Facility: OTHER | Age: 54
End: 2024-12-19
Payer: COMMERCIAL

## 2024-12-19 DIAGNOSIS — E03.9 ACQUIRED HYPOTHYROIDISM: Primary | Chronic | ICD-10-CM

## 2024-12-19 NOTE — OUTREACH NOTE
AMBULATORY CASE MANAGEMENT NOTE    Names and Relationships of Patient/Support Persons: Contact: Greyson Schofield; Relationship: Self -     CCM Interim Update    Called to review need for labs and informed where he could go to do labs. He reports he will get them done.             Bushra CHEN  Ambulatory Case Management    12/19/2024, 13:54 EST

## 2025-01-21 ENCOUNTER — TELEPHONE (OUTPATIENT)
Dept: FAMILY MEDICINE CLINIC | Facility: CLINIC | Age: 55
End: 2025-01-21
Payer: COMMERCIAL

## 2025-01-21 NOTE — TELEPHONE ENCOUNTER
Caller: Greyson Schofield    Relationship: Self    Best call back number: [phone]     What medication are you requesting: MEDICATION THAT WAS PREVIOUSLY PRESCRIBED TO HELP SLEEP    What are your current symptoms: TROUBLE SLEEPING      Have you had these symptoms before:    [x] Yes  [] No    Have you been treated for these symptoms before:   [x] Yes  [] No    If a prescription is needed, what is your preferred pharmacy and phone number: [facility] - [place], [place] - [address] - [phone] [facility] [phone]      Additional notes:  PATIENT COULD NOT REMEMBER NAME OF MEDICATION, STATES IT WAS PRESCRIBED SEVERAL MONTHS AGO BY DR READ OR TYRON    PLEASE ADVISE

## 2025-01-21 NOTE — TELEPHONE ENCOUNTER
Pt is requesting us to send in another RX for his sleep aid medication. Said we or jneny prescribed something a few months ago, couldn't remember the name, but said it helped and he is having issues sleeping again. I don't see anything on his med list other than Cymbalta and Remeron but those aren't for sleeping.

## 2025-02-02 RX ORDER — MIRTAZAPINE 30 MG/1
30 TABLET, FILM COATED ORAL NIGHTLY
Qty: 30 TABLET | Refills: 2 | Status: SHIPPED | OUTPATIENT
Start: 2025-02-02

## 2025-02-03 NOTE — TELEPHONE ENCOUNTER
I sent in 30 mg Remeron nightly. If he develops swelling or fluid retention then stop the medication.

## 2025-02-16 NOTE — TELEPHONE ENCOUNTER
Chart review with [facility] billing, noted he has seen PCP 4/15/24 and no changes and will follow up again 3 months and has labs ordered and repeat thyroid labs 6 weeks.   [facility] Clinical Pharmacy Services: Enoxaparin Consult    Tony Leonard has a pharmacy consult to dose prophylactic enoxaparin per Dr. Chau's request.     Indication: VTE Prophylaxis  Home Anticoagulation: None     Relevant clinical data and objective history reviewed:  88 y.o. male   64.7 kg (142 lb 10.2 oz)   Body mass index is 19.89 kg/m².     Results from last 7 days   Lab Units 02/16/25  1158   PLATELETS 10*3/mm3 188     Estimated Creatinine Clearance: 46.7 mL/min (by C-G formula based on SCr of 1.04 mg/dL).    Assessment/Plan    Will start patient on 40mg subcutaneous every 24 hours, adjusted for renal function. Consult order will be discontinued but pharmacy will continue to follow.     Myah Peace, Pharm.D., [facility]   Clinical Pharmacist

## 2025-02-19 RX ORDER — LEVOTHYROXINE SODIUM 150 UG/1
150 TABLET ORAL DAILY
Qty: 30 TABLET | Refills: 2 | Status: SHIPPED | OUTPATIENT
Start: 2025-02-19

## 2025-03-26 RX ORDER — SPIRONOLACTONE 25 MG/1
25 TABLET ORAL DAILY
Qty: 90 TABLET | Refills: 1 | Status: SHIPPED | OUTPATIENT
Start: 2025-03-26

## 2025-03-26 RX ORDER — FUROSEMIDE 40 MG/1
40 TABLET ORAL DAILY
Qty: 90 TABLET | Refills: 1 | Status: SHIPPED | OUTPATIENT
Start: 2025-03-26

## 2025-03-26 RX ORDER — ASPIRIN 81 MG/1
81 TABLET, COATED ORAL DAILY
Qty: 90 TABLET | Refills: 1 | Status: SHIPPED | OUTPATIENT
Start: 2025-03-26

## 2025-04-25 DIAGNOSIS — K21.9 GASTROESOPHAGEAL REFLUX DISEASE WITHOUT ESOPHAGITIS: ICD-10-CM

## 2025-04-25 RX ORDER — OMEPRAZOLE 40 MG/1
40 CAPSULE, DELAYED RELEASE ORAL DAILY
Qty: 30 CAPSULE | Refills: 5 | Status: SHIPPED | OUTPATIENT
Start: 2025-04-25

## 2025-04-25 RX ORDER — MIRTAZAPINE 30 MG/1
30 TABLET, FILM COATED ORAL
Qty: 30 TABLET | Refills: 5 | Status: SHIPPED | OUTPATIENT
Start: 2025-04-25

## 2025-05-30 RX ORDER — LEVOTHYROXINE SODIUM 150 UG/1
150 TABLET ORAL DAILY
Qty: 30 TABLET | Refills: 2 | Status: SHIPPED | OUTPATIENT
Start: 2025-05-30

## 2025-07-31 ENCOUNTER — TELEPHONE (OUTPATIENT)
Dept: FAMILY MEDICINE CLINIC | Facility: CLINIC | Age: 55
End: 2025-07-31
Payer: COMMERCIAL

## 2025-07-31 RX ORDER — ACYCLOVIR 50 MG/G
1 OINTMENT TOPICAL 3 TIMES DAILY PRN
Qty: 30 G | Refills: 2 | Status: SHIPPED | OUTPATIENT
Start: 2025-07-31

## 2025-07-31 NOTE — TELEPHONE ENCOUNTER
Patient had a sexual encounter, now has facial rash, appears to be herpes - per patient.  He's asking for a topical medication for herpes treatment.

## 2025-07-31 NOTE — TELEPHONE ENCOUNTER
I will my, do not really know how to respond to this.  I will send in some acyclovir topical for him to try.

## (undated) DEVICE — GLIDESHEATH SLENDER STAINLESS STEEL KIT: Brand: GLIDESHEATH SLENDER

## (undated) DEVICE — RADIFOCUS OPTITORQUE ANGIOGRAPHIC CATHETER: Brand: OPTITORQUE

## (undated) DEVICE — SI AVANTI+ 7F STD W/GW  NO OBT: Brand: AVANTI

## (undated) DEVICE — GW FC FLOP/TP .035 260CM 3MM

## (undated) DEVICE — SWAN-GANZ TRUE SIZE THERMODILUTION "S" TIP: Brand: SWAN-GANZ TRUE SIZE

## (undated) DEVICE — CATH LAB PACK: Brand: MEDLINE INDUSTRIES, INC.